# Patient Record
Sex: MALE | Race: WHITE | NOT HISPANIC OR LATINO | Employment: FULL TIME | URBAN - METROPOLITAN AREA
[De-identification: names, ages, dates, MRNs, and addresses within clinical notes are randomized per-mention and may not be internally consistent; named-entity substitution may affect disease eponyms.]

---

## 2017-02-09 RX ORDER — VENLAFAXINE HYDROCHLORIDE 150 MG/1
CAPSULE, EXTENDED RELEASE ORAL
Qty: 90 CAPSULE | OUTPATIENT
Start: 2017-02-09

## 2017-02-09 NOTE — TELEPHONE ENCOUNTER
effexor    Last Written Prescription Date: 5/13/16  Last Fill Quantity: 90, # refills: 3  Last Office Visit with G, P or [facility] prescribing provider: 11/15/16        BP Readings from Last 3 Encounters:   11/15/16 140/100   05/13/16 118/84   04/12/16 130/80     Pulse: (for Fetzima)  CREATININE   Date Value Ref Range Status   05/13/2016 1.11 0.66 - 1.25 mg/dL Final   ]    Last PHQ-9 score on record=   PHQ-9 SCORE 5/13/2016   Total Score -   Total Score 10       Patient has refills on file, request denied.   Mercedes Mcgregor RN   [facility] - Triage

## 2017-03-02 ENCOUNTER — TRANSFERRED RECORDS (OUTPATIENT)
Dept: HEALTH INFORMATION MANAGEMENT | Facility: CLINIC | Age: 57
End: 2017-03-02

## 2017-03-14 ENCOUNTER — PRE VISIT (OUTPATIENT)
Dept: OTOLARYNGOLOGY | Facility: CLINIC | Age: 57
End: 2017-03-14

## 2017-03-14 ENCOUNTER — TRANSFERRED RECORDS (OUTPATIENT)
Dept: HEALTH INFORMATION MANAGEMENT | Facility: CLINIC | Age: 57
End: 2017-03-14

## 2017-03-14 NOTE — TELEPHONE ENCOUNTER
1.  Date/reason for appt: 3/31/17 3:30PM Dx: sleep apnea. Sleep study done on 3/2/17 at [facility]  2.  Referring provider: Self   3.  Call to patient (Yes / No - short description): Yes, call was transferred to me from CC.  4.  Previous care at / records requested from:  [facility] Dr. Jorge Mackenzie - Estela GRAVES   Emailed STAR  to pt ([email or web address])

## 2017-03-15 NOTE — TELEPHONE ENCOUNTER
Records received from [facility].   Included  Office notes: 3/14/17, 2/10/17   Other: sleep study on 3/2/17

## 2017-04-04 DIAGNOSIS — F41.0 PANIC DISORDER WITHOUT AGORAPHOBIA: ICD-10-CM

## 2017-04-04 DIAGNOSIS — Z00.00 ROUTINE GENERAL MEDICAL EXAMINATION AT A HEALTH CARE FACILITY: ICD-10-CM

## 2017-04-04 NOTE — TELEPHONE ENCOUNTER
Venlafaxine    Last Written Prescription Date: 5/13/16  Last Fill Quantity: 90, # refills: 3  Last Office Visit with [facility], [facility] or [facility] prescribing provider: 11/15/16        BP Readings from Last 3 Encounters:   11/15/16 (!) 140/100   05/13/16 118/84   04/12/16 130/80     Pulse: (for Fetzima)  Creatinine   Date Value Ref Range Status   05/13/2016 1.11 0.66 - 1.25 mg/dL Final   ]    Last PHQ-9 score on record=   PHQ-9 SCORE 5/13/2016   Total Score -   Total Score 10     Vane Archer CMA

## 2017-04-05 RX ORDER — VENLAFAXINE HYDROCHLORIDE 150 MG/1
CAPSULE, EXTENDED RELEASE ORAL
Qty: 90 CAPSULE | OUTPATIENT
Start: 2017-04-05

## 2017-04-05 NOTE — TELEPHONE ENCOUNTER
Should have supply until May 2017 when he is due for OV (CPE) with Dr. Davis.  Patient advised via [employer] message.    Rx refused as request too soon.      Mary Navarrete RN

## 2017-05-10 DIAGNOSIS — F41.0 PANIC DISORDER WITHOUT AGORAPHOBIA: ICD-10-CM

## 2017-05-10 DIAGNOSIS — Z00.00 ROUTINE GENERAL MEDICAL EXAMINATION AT A HEALTH CARE FACILITY: ICD-10-CM

## 2017-05-11 RX ORDER — VENLAFAXINE HYDROCHLORIDE 150 MG/1
CAPSULE, EXTENDED RELEASE ORAL
Qty: 90 CAPSULE | Refills: 0 | Status: SHIPPED | OUTPATIENT
Start: 2017-05-11 | End: 2017-07-21

## 2017-05-11 NOTE — TELEPHONE ENCOUNTER
Called patient- he is due for office visit, he has 7 days left of medication- appointment scheduled for Tuesday with Dr. Davis. Refill sent to mail order vitaly Wilder RN  [facility]  [phone]

## 2017-05-11 NOTE — TELEPHONE ENCOUNTER
venlafaxine (EFFEXOR-XR) 150 MG 24 hr capsule     Last Written Prescription Date: 5/13/2016  Last Fill Quantity: 90, # refills: 3  Last Office Visit with [facility] primary care provider:  11/15/2016        Last PHQ-9 score on record=   PHQ-9 SCORE 5/13/2016   Total Score -   Total Score 10

## 2017-06-07 DIAGNOSIS — F40.10 SOCIAL PHOBIA: ICD-10-CM

## 2017-06-07 DIAGNOSIS — F41.0 PANIC DISORDER WITHOUT AGORAPHOBIA: ICD-10-CM

## 2017-06-07 RX ORDER — PROPRANOLOL HYDROCHLORIDE 10 MG/1
TABLET ORAL
Qty: 15 TABLET | Refills: 0 | Status: SHIPPED | OUTPATIENT
Start: 2017-06-07 | End: 2017-07-09

## 2017-06-07 NOTE — TELEPHONE ENCOUNTER
propranolol      Last Written Prescription Date: 4/12/2017  Last Fill Quantity: 30, # refills: 1    Last Office Visit with G, [facility] or [facility] prescribing provider:  11/15/2016   Future Office Visit:        BP Readings from Last 3 Encounters:   11/15/16 (!) 140/100   05/13/16 118/84   04/12/16 130/80

## 2017-06-07 NOTE — TELEPHONE ENCOUNTER
Routing refill request to provider for review/approval because:  Lou given x1 and patient did not follow up, please advise, patient has been sent 2 messages one which he has read  Labs out of range:  BP elevated  Patient needs to be seen because:  Due for follow up   Yeimi Da Silva RN  [facility] [employer]

## 2017-06-27 ENCOUNTER — OFFICE VISIT (OUTPATIENT)
Dept: OTOLARYNGOLOGY | Facility: CLINIC | Age: 57
End: 2017-06-27

## 2017-06-27 VITALS — HEIGHT: 72 IN | BODY MASS INDEX: 30.2 KG/M2 | WEIGHT: 223 LBS

## 2017-06-27 DIAGNOSIS — G47.33 OSA (OBSTRUCTIVE SLEEP APNEA): Primary | ICD-10-CM

## 2017-06-27 RX ORDER — GLYCOPYRROLATE 0.2 MG/ML
0.2 INJECTION, SOLUTION INTRAMUSCULAR; INTRAVENOUS ONCE
Status: CANCELLED | OUTPATIENT
Start: 2017-06-27 | End: 2017-06-27

## 2017-06-27 RX ORDER — OXYMETAZOLINE HYDROCHLORIDE 0.05 G/100ML
2 SPRAY NASAL 2 TIMES DAILY
Status: CANCELLED | OUTPATIENT
Start: 2017-06-27

## 2017-06-27 ASSESSMENT — PAIN SCALES - GENERAL: PAINLEVEL: NO PAIN (0)

## 2017-06-27 NOTE — LETTER
2017       RE: Juni Jain  [address] [place] [address]     Dear Colleague,    Thank you for referring your patient, Juni Jain, to the [facility] EAR NOSE AND THROAT at [facility]. Please see a copy of my visit note below.        SLEEP SURGERY CONSULTATION    Patient: Juni Jain  : 1960  CHIEF COMPLAINT: RYE    IDENTIFICATION: Patient consulted Dr. Paige for surgical evaluation and possible treatment of obstructive sleep apnea syndrome for Juni Jain.    HPI:  Juni Jain is a 56 year old year old male who has Obstructive Sleep Apnea. Diagnosed with REY in  originally.  Most recent sleep study is an HST from [facility] on  3/2/2017.  RDI was 40.7; 18 OA, 1 MA, 0 CA, and 190 hypopneas.  Tried CPAP on 2 occasions for 1 month each time.  He is unable to fall asleep with the mask on.  He has tried several different masks. He feels the CPAP is very disruptive to his sleep.  The air leaks are very bothersome.  He has unrefreshing sleep, loud snoring, daytime sleepiness.  He has had several occasions of drowsy driving but no accidents.          PAST MEDICAL HISTORY:  Past Medical History:   Diagnosis Date     Anxiety state, unspecified      Coronary artery disease 2013    some angina here and there. 2013 at [facility]     Depressive disorder     Anxiety     Depressive disorder, not elsewhere classified      Gastroesophageal reflux disease     somewhat but stopped taking Nexium     Obstructive sleep apnea 2017    most recent sleep study from [facility]     Other and unspecified hyperlipidemia      Sleep apnea     Chronic issue for over 15+ years       PAST SURGICAL HISTORY:  Past Surgical History:   Procedure Laterality Date     COLONOSCOPY      Going 2016     COSMETIC SURGERY      Rhinoplasty/chin implant-Deviated Septum     ENT SURGERY  over the  years    CPAP never worked for me. Uncomfortable.       MEDICATIONS:  Current Outpatient Prescriptions   Medication Sig Dispense Refill     venlafaxine (EFFEXOR-XR) 150 MG 24 hr capsule Take 1 capsule by mouth  daily 90 capsule 0     atorvastatin (LIPITOR) 40 MG tablet Take 1 tablet (40 mg) by mouth daily 90 tablet 3     VITAMIN D, CHOLECALCIFEROL, PO Take 2-4 tablets by mouth daily.       Omega-3 Fatty Acids (FISH OIL) 1200 MG capsule Take 2 capsules by mouth daily. 180 capsule 12     propranolol (INDERAL) 10 MG tablet Take 1-2 tabs 90 minutes before presentation (Patient not taking: Reported on 6/27/2017) 15 tablet 0     ALPRAZolam (XANAX) 0.25 MG tablet as directed for anxiety prior to presentations and PRN anxiety (Patient not taking: Reported on 6/27/2017) 15 tablet 1       ALLERGIES:  Allergies   Allergen Reactions     No Known Drug Allergies        SOCIAL HISTORY:  Social History     Social History     Marital status:      Spouse name: sera saldivar     Number of children: 3     Years of education: 16     Occupational History     sales None      Social History Main Topics     Smoking status: Never Smoker     Smokeless tobacco: Never Used      Comment: Do not smoke     Alcohol use 0.0 oz/week      Comment: Weekends     Drug use: No     Sexual activity: Yes     Partners: Female     Birth control/ protection: Male Surgical      Comment: Vasectomy     Other Topics Concern      Service No     Blood Transfusions No     Caffeine Concern No     Occupational Exposure No     Hobby Hazards No     Sleep Concern No     Stress Concern Yes     Weight Concern Yes     Special Diet Yes     wt watchers     Back Care No     Exercise Yes     Bike Helmet No     Seat Belt Yes     Self-Exams No     Parent/Sibling W/ Cabg, Mi Or Angioplasty Before 65f 55m? No     Social History Narrative       FAMILY HISTORY:  Family History   Problem Relation Age of Onset     Alzheimer Disease Mother      Dementia Mother      Dementia      "C.A.D. Father 86     Bypass 5/2011     CANCER Father      Esophageal cancer     Thyroid Disease Brother      Thyroid Disease Brother      Hypo. Under     CANCER Maternal Grandmother      Stomach Cancer     CANCER Maternal Grandfather      Lung Cancer     Thyroid Disease Brother      ,       REVIEW OF SYSTEMS:   ENT ROS 3/31/2017   Constitutional Unexplained fatigue, Problems with sleep   Neurology Headache   Psychology Frequently feeling depressed or sad, Frequently feeling anxious   Ears, Nose, Throat Nasal congestion or drainage   Cardiopulmonary Cough, Wheezing   Musculoskeletal Neck pain         PHYSICAL EXAM  Ht 1.84 m (6' 0.44\")  Wt 101.2 kg (223 lb)  BMI 29.88 kg/m2    Constitutional: healthy, alert and no distress  ENT:   Mouth: MMM 4, very long uvula, tonsils 1+    EYES: extraocular movements intact      ASSESSMENT:  1.  Severe obstructive sleep apnea, untreated   Incompletely treated sleep apnea elevates risk of death, cardiovascular disease, and motor vehicle crashes, and it creates deficits in daytime function and quality of life.  Surgical treatment can improve these clinically important outcomes; therefore surgical treatment is medically necessary if the patient is not using CPAP adequately.       PLAN:  1.  We discussed obstructive sleep apnea, including pathophysiology and consequences.  We provided the patient with written information about obstructive sleep apnea and its management.  We discussed the beneficial relationship between weight loss and sleep apnea.      I discussed with the patient how UAS works.  We reviewed expected outcomes and MRI restrictions.  We then reviewed the selection criteria.  He does meet 3 out of the 4 criteria.  I would like to determine if the patient is a candidate for surgery.  I would recommend a drug-induced sleep endoscopy to better evaluate the upper airway in order to determine sites of obstruction, degree of collapse, and pattern of collapse.  This " information helps me to  patient what types of surgery are available to him and on chances for surgical success.    I spent 35 minutes face-to-face with Juni Jain during today's office visit, of which more than 50% was spent on counseling and coordination of care, which included discussion of pathophysiology of patient's obstructive sleep apnea, treatment options, risks and benefits of each option.      Again, thank you for allowing me to participate in the care of your patient.      Sincerely,    Liliana Paige MD

## 2017-06-27 NOTE — PROGRESS NOTES
SLEEP SURGERY CONSULTATION    Patient: Juni Jain  : 1960  CHIEF COMPLAINT: REY    IDENTIFICATION: Patient consulted Dr. Paige for surgical evaluation and possible treatment of obstructive sleep apnea syndrome for Juni Jain.    HPI:  Juni Jain is a 56 year old year old male who has Obstructive Sleep Apnea. Diagnosed with REY in  originally.  Most recent sleep study is an HST from [facility] on  3/2/2017.  RDI was 40.7; 18 OA, 1 MA, 0 CA, and 190 hypopneas.  Tried CPAP on 2 occasions for 1 month each time.  He is unable to fall asleep with the mask on.  He has tried several different masks. He feels the CPAP is very disruptive to his sleep.  The air leaks are very bothersome.  He has unrefreshing sleep, loud snoring, daytime sleepiness.  He has had several occasions of drowsy driving but no accidents.          PAST MEDICAL HISTORY:  Past Medical History:   Diagnosis Date     Anxiety state, unspecified      Coronary artery disease 2013    some angina here and there. 2013 at [facility]     Depressive disorder     Anxiety     Depressive disorder, not elsewhere classified      Gastroesophageal reflux disease     somewhat but stopped taking Nexium     Obstructive sleep apnea 2017    most recent sleep study from [facility]     Other and unspecified hyperlipidemia      Sleep apnea 1994    Chronic issue for over 15+ years       PAST SURGICAL HISTORY:  Past Surgical History:   Procedure Laterality Date     COLONOSCOPY      Going 2016     COSMETIC SURGERY  1981    Rhinoplasty/chin implant-Deviated Septum     ENT SURGERY  over the years    CPAP never worked for me. Uncomfortable.       MEDICATIONS:  Current Outpatient Prescriptions   Medication Sig Dispense Refill     venlafaxine (EFFEXOR-XR) 150 MG 24 hr capsule Take 1 capsule by mouth  daily 90 capsule 0     atorvastatin (LIPITOR) 40 MG tablet Take 1 tablet (40 mg) by mouth  daily 90 tablet 3     VITAMIN D, CHOLECALCIFEROL, PO Take 2-4 tablets by mouth daily.       Omega-3 Fatty Acids (FISH OIL) 1200 MG capsule Take 2 capsules by mouth daily. 180 capsule 12     propranolol (INDERAL) 10 MG tablet Take 1-2 tabs 90 minutes before presentation (Patient not taking: Reported on 6/27/2017) 15 tablet 0     ALPRAZolam (XANAX) 0.25 MG tablet as directed for anxiety prior to presentations and PRN anxiety (Patient not taking: Reported on 6/27/2017) 15 tablet 1       ALLERGIES:  Allergies   Allergen Reactions     No Known Drug Allergies        SOCIAL HISTORY:  Social History     Social History     Marital status:      Spouse name: sera saldivar     Number of children: 3     Years of education: 16     Occupational History     sales None      Social History Main Topics     Smoking status: Never Smoker     Smokeless tobacco: Never Used      Comment: Do not smoke     Alcohol use 0.0 oz/week      Comment: Weekends     Drug use: No     Sexual activity: Yes     Partners: Female     Birth control/ protection: Male Surgical      Comment: Vasectomy     Other Topics Concern      Service No     Blood Transfusions No     Caffeine Concern No     Occupational Exposure No     Hobby Hazards No     Sleep Concern No     Stress Concern Yes     Weight Concern Yes     Special Diet Yes     wt watchers     Back Care No     Exercise Yes     Bike Helmet No     Seat Belt Yes     Self-Exams No     Parent/Sibling W/ Cabg, Mi Or Angioplasty Before 65f 55m? No     Social History Narrative       FAMILY HISTORY:  Family History   Problem Relation Age of Onset     Alzheimer Disease Mother      Dementia Mother      Dementia     C.A.D. Father 86     Bypass 5/2011     CANCER Father      Esophageal cancer     Thyroid Disease Brother      Thyroid Disease Brother      Hypo. Under     CANCER Maternal Grandmother      Stomach Cancer     CANCER Maternal Grandfather      Lung Cancer     Thyroid Disease Brother      ,       REVIEW OF  "SYSTEMS:   ENT ROS 3/31/2017   Constitutional Unexplained fatigue, Problems with sleep   Neurology Headache   Psychology Frequently feeling depressed or sad, Frequently feeling anxious   Ears, Nose, Throat Nasal congestion or drainage   Cardiopulmonary Cough, Wheezing   Musculoskeletal Neck pain         PHYSICAL EXAM  Ht 1.84 m (6' 0.44\")  Wt 101.2 kg (223 lb)  BMI 29.88 kg/m2    Constitutional: healthy, alert and no distress  ENT:   Mouth: MMM 4, very long uvula, tonsils 1+    EYES: extraocular movements intact      ASSESSMENT:  1.  Severe obstructive sleep apnea, untreated   Incompletely treated sleep apnea elevates risk of death, cardiovascular disease, and motor vehicle crashes, and it creates deficits in daytime function and quality of life.  Surgical treatment can improve these clinically important outcomes; therefore surgical treatment is medically necessary if the patient is not using CPAP adequately.       PLAN:  1.  We discussed obstructive sleep apnea, including pathophysiology and consequences.  We provided the patient with written information about obstructive sleep apnea and its management.  We discussed the beneficial relationship between weight loss and sleep apnea.      I discussed with the patient how UAS works.  We reviewed expected outcomes and MRI restrictions.  We then reviewed the selection criteria.  He does meet 3 out of the 4 criteria.  I would like to determine if the patient is a candidate for surgery.  I would recommend a drug-induced sleep endoscopy to better evaluate the upper airway in order to determine sites of obstruction, degree of collapse, and pattern of collapse.  This information helps me to  patient what types of surgery are available to him and on chances for surgical success.    I spent 35 minutes face-to-face with Juni Jain during today's office visit, of which more than 50% was spent on counseling and coordination of care, which included discussion of " pathophysiology of patient's obstructive sleep apnea, treatment options, risks and benefits of each option.

## 2017-06-27 NOTE — NURSING NOTE
Chief Complaint   Patient presents with     Consult     [facility] - Sleep apnea     Pt states no pain today.    MELY Montesinos LPN

## 2017-06-27 NOTE — NURSING NOTE
Relevant Diagnosis: sleep apnea  Teaching Topic: Pre op teaching for drug induced sleep endoscopy    Person(s) involved in teaching:  Patient     Teaching Concerns Addressed:  Pre op teaching included the need for an H&P, NPO status pre op, hospital routines, expected recovery, activity  restrictions, antimicrobial scrub, s/s of infection, pain control methods and the importance of follow up appointments.  The patient voiced an understanding of all instructions and will call with questions.     Motivation Level:  Asks Questions:   Yes  Eager to Learn:   Yes  Cooperative:   Yes  Receptive (willing/able to accept information):   Yes     Patient  demonstrates understanding of the following:  Reason for the appointment, diagnosis and treatment plan:   Yes  Knowledge of proper use of medications and conditions for which they are ordered (with special attention to potential side effects or drug interactions):   Yes  Which situations necessitate calling provider and whom to contact:   Yes        Proper use and care of  (medical equip, care aids, etc.):   NA  Nutritional needs and diet plan:   Yes  Pain management techniques:   Yes  Patient instructed on hand hygiene:  Yes  How and/when to access community resources:   NA     Infection Prevention:  Patient   demonstrates understanding of the following:  Surgical procedure site care taught   Signs and symptoms of infection taught Yes  Wound care taught Yes     Instructional Materials Used/Given: Pre op booklet.

## 2017-06-27 NOTE — PATIENT INSTRUCTIONS
1.  You were seen in the ENT Clinic today by Dr. Paige.  If you have any questions or concerns after your appointment, please call [phone].  2.  Plan is to move forward with a drug induced sleep endoscopy. This is an outpatient procedure that is done in the operating room.  You will need a  the day of surgery  3. You will need to consult with your primary care MD for a pre op physical.  Forms for this were sent home with you today.  4. [facility] our surgery scheduler will call you with a date and time for the procedure. [phone]  5.  Please return to the clinic one week after your procedure to review the results with Dr. Paige and to develop the plan of care.

## 2017-06-27 NOTE — MR AVS SNAPSHOT
After Visit Summary   6/27/2017    Juni Jain    MRN: [id number]           Patient Information     Date Of Birth          1960        Visit Information        Provider Department      6/27/2017 3:45 PM Liliana Paige MD [facility] Ear Nose and Throat        Today's Diagnoses     REY (obstructive sleep apnea)    -  1      Care Instructions    1.  You were seen in the ENT Clinic today by Dr. Paige.  If you have any questions or concerns after your appointment, please call [phone].  2.  Plan is to move forward with a drug induced sleep endoscopy. This is an outpatient procedure that is done in the operating room.  You will need a  the day of surgery  3. You will need to consult with your primary care MD for a pre op physical.  Forms for this were sent home with you today.  4. [facility] surgery scheduler will call you with a date and time for the procedure. [phone]  5.  Please return to the clinic one week after your procedure to review the results with Dr. Paige and to develop the plan of care.                  Follow-ups after your visit        Who to contact     Please call your clinic at [phone] to:    Ask questions about your health    Make or cancel appointments    Discuss your medicines    Learn about your test results    Speak to your doctor   If you have compliments or concerns about an experience at your clinic, or if you wish to file a complaint, please contact [facility] Patient Relations at [phone] or email us at [email or web address]         Additional Information About Your Visit        MyChart Information     [employer] gives you secure access to your electronic health record. If you see a primary care provider, you can also send messages to your care team and make appointments. If you have questions, please call your primary care clinic.  If you do not have a primary care provider, please call [phone] and  "they will assist you.      [employer] is an electronic gateway that provides easy, online access to your medical records. With [employer] you can request a clinic appointment, read your test results, renew a prescription or communicate with your care team.     To access your existing account, please contact your [facility] or call [phone] for assistance.        Care EveryWhere ID     This is your Care EveryWhere ID. This could be used by other organizations to access your [place] medical records  [id number]        Your Vitals Were     Height BMI (Body Mass Index)                1.84 m (6' 0.44\") 29.88 kg/m2           Blood Pressure from Last 3 Encounters:   11/15/16 (!) 140/100   05/13/16 118/84   04/12/16 130/80    Weight from Last 3 Encounters:   06/27/17 101.2 kg (223 lb)   11/15/16 100.5 kg (221 lb 9.6 oz)   05/13/16 101.2 kg (223 lb)              We Performed the Following     Venus-Operative Worksheet        Primary Care Provider Office Phone # Fax #    Ru Davis [phone]       [facility] [place] [address] [place] [address] DR  [place] [address]        Equal Access to Services     BLANE ALLISON : Hadii aad ku hadasho Soomaali, waaxda luqadaha, qaybta kaalmada adeegyada, waxay idiin hayaan leah luna la'ania . So [facility] [phone].    ATENCIÓN: Si habla español, tiene a mitchell disposición servicios gratuitos de asistencia lingüística. Llame al [phone].    We comply with applicable federal civil rights laws and [place] laws. We do not discriminate on the basis of race, color, national origin, age, disability sex, sexual orientation or gender identity.            Thank you!     Thank you for choosing [facility] EAR NOSE AND THROAT  for your care. Our goal is always to provide you with excellent care. Hearing back from our patients is one way we can continue to improve our services. Please take a few minutes to complete the written survey that you may " receive in the mail after your visit with us. Thank you!             Your Updated Medication List - Protect others around you: Learn how to safely use, store and throw away your medicines at www.disposemymeds.org.          This list is accurate as of: 6/27/17  4:23 PM.  Always use your most recent med list.                   Brand Name Dispense Instructions for use Diagnosis    ALPRAZolam 0.25 MG tablet    XANAX    15 tablet    as directed for anxiety prior to presentations and PRN anxiety    Panic disorder without agoraphobia, Social phobia       atorvastatin 40 MG tablet    LIPITOR    90 tablet    Take 1 tablet (40 mg) by mouth daily    Hyperlipidemia LDL goal <130       omega-3 fatty acids 1200 MG capsule     180 capsule    Take 2 capsules by mouth daily.        propranolol 10 MG tablet    INDERAL    15 tablet    Take 1-2 tabs 90 minutes before presentation    Panic disorder without agoraphobia, Social phobia       venlafaxine 150 MG 24 hr capsule    EFFEXOR-XR    90 capsule    Take 1 capsule by mouth  daily    Panic disorder without agoraphobia, Routine general medical examination at a health care facility       VITAMIN D (CHOLECALCIFEROL) PO      Take 2-4 tablets by mouth daily.

## 2017-07-04 DIAGNOSIS — F41.0 PANIC DISORDER WITHOUT AGORAPHOBIA: ICD-10-CM

## 2017-07-04 DIAGNOSIS — Z00.00 ROUTINE GENERAL MEDICAL EXAMINATION AT A HEALTH CARE FACILITY: ICD-10-CM

## 2017-07-05 NOTE — TELEPHONE ENCOUNTER
Venlafaxine (EFFEXOR-XR) 150 MG 24 hr capsule    Last Written Prescription Date: 05/11/2017  Last Fill Quantity: 90 capsule, # refills: 0  Last Office Visit with G, P or [facility] prescribing provider: 11/15/2016   Next 5 appointments (look out 90 days)     Jul 07, 2017  8:40 AM CDT   Pre-Op physical with Ru Davis MD   [facility]    [address] [place] [address] [phone]                   BP Readings from Last 3 Encounters:   11/15/16 (!) 140/100   05/13/16 118/84   04/12/16 130/80     Pulse: (for Fetzima)  Creatinine   Date Value Ref Range Status   05/13/2016 1.11 0.66 - 1.25 mg/dL Final   ]    Last PHQ-9 score on record=   PHQ-9 SCORE 5/13/2016   Total Score -   Total Score 10       Sonali Hdz MA

## 2017-07-06 RX ORDER — VENLAFAXINE HYDROCHLORIDE 150 MG/1
CAPSULE, EXTENDED RELEASE ORAL
Qty: 90 CAPSULE | OUTPATIENT
Start: 2017-07-06

## 2017-07-06 NOTE — TELEPHONE ENCOUNTER
Patient should have enough for one more month- refill too early    Paty Wilder,RN  [facility]  [phone]

## 2017-07-07 ENCOUNTER — NURSE TRIAGE (OUTPATIENT)
Dept: NURSING | Facility: CLINIC | Age: 57
End: 2017-07-07

## 2017-07-07 ENCOUNTER — OFFICE VISIT (OUTPATIENT)
Dept: FAMILY MEDICINE | Facility: CLINIC | Age: 57
End: 2017-07-07
Payer: COMMERCIAL

## 2017-07-07 VITALS
WEIGHT: 224 LBS | DIASTOLIC BLOOD PRESSURE: 76 MMHG | HEART RATE: 79 BPM | OXYGEN SATURATION: 94 % | TEMPERATURE: 95.7 F | SYSTOLIC BLOOD PRESSURE: 104 MMHG | BODY MASS INDEX: 30.01 KG/M2

## 2017-07-07 DIAGNOSIS — R79.89 ELEVATED LIVER FUNCTION TESTS: ICD-10-CM

## 2017-07-07 DIAGNOSIS — G47.33 OSA (OBSTRUCTIVE SLEEP APNEA): ICD-10-CM

## 2017-07-07 DIAGNOSIS — R01.1 CARDIAC MURMUR: ICD-10-CM

## 2017-07-07 DIAGNOSIS — Z01.818 PREOP GENERAL PHYSICAL EXAM: Primary | ICD-10-CM

## 2017-07-07 DIAGNOSIS — F41.1 GAD (GENERALIZED ANXIETY DISORDER): ICD-10-CM

## 2017-07-07 LAB
ALBUMIN SERPL-MCNC: 4.1 G/DL (ref 3.4–5)
ALP SERPL-CCNC: 107 U/L (ref 40–150)
ALT SERPL W P-5'-P-CCNC: 95 U/L (ref 0–70)
ANION GAP SERPL CALCULATED.3IONS-SCNC: 12 MMOL/L (ref 3–14)
AST SERPL W P-5'-P-CCNC: 30 U/L (ref 0–45)
BILIRUB SERPL-MCNC: 1.6 MG/DL (ref 0.2–1.3)
BUN SERPL-MCNC: 13 MG/DL (ref 7–30)
CALCIUM SERPL-MCNC: 8.9 MG/DL (ref 8.5–10.1)
CHLORIDE SERPL-SCNC: 107 MMOL/L (ref 94–109)
CO2 SERPL-SCNC: 21 MMOL/L (ref 20–32)
CREAT SERPL-MCNC: 1.17 MG/DL (ref 0.66–1.25)
GFR SERPL CREATININE-BSD FRML MDRD: 65 ML/MIN/1.7M2
GLUCOSE SERPL-MCNC: 109 MG/DL (ref 70–99)
HGB BLD-MCNC: 16.7 G/DL (ref 13.3–17.7)
POTASSIUM SERPL-SCNC: 4.3 MMOL/L (ref 3.4–5.3)
PROT SERPL-MCNC: 7.4 G/DL (ref 6.8–8.8)
SODIUM SERPL-SCNC: 140 MMOL/L (ref 133–144)

## 2017-07-07 PROCEDURE — 85018 HEMOGLOBIN: CPT | Performed by: FAMILY MEDICINE

## 2017-07-07 PROCEDURE — 36415 COLL VENOUS BLD VENIPUNCTURE: CPT | Performed by: FAMILY MEDICINE

## 2017-07-07 PROCEDURE — 99214 OFFICE O/P EST MOD 30 MIN: CPT | Performed by: FAMILY MEDICINE

## 2017-07-07 PROCEDURE — 80053 COMPREHEN METABOLIC PANEL: CPT | Performed by: FAMILY MEDICINE

## 2017-07-07 ASSESSMENT — ANXIETY QUESTIONNAIRES
6. BECOMING EASILY ANNOYED OR IRRITABLE: MORE THAN HALF THE DAYS
GAD7 TOTAL SCORE: 12
2. NOT BEING ABLE TO STOP OR CONTROL WORRYING: MORE THAN HALF THE DAYS
7. FEELING AFRAID AS IF SOMETHING AWFUL MIGHT HAPPEN: SEVERAL DAYS
5. BEING SO RESTLESS THAT IT IS HARD TO SIT STILL: SEVERAL DAYS
1. FEELING NERVOUS, ANXIOUS, OR ON EDGE: MORE THAN HALF THE DAYS
3. WORRYING TOO MUCH ABOUT DIFFERENT THINGS: MORE THAN HALF THE DAYS
IF YOU CHECKED OFF ANY PROBLEMS ON THIS QUESTIONNAIRE, HOW DIFFICULT HAVE THESE PROBLEMS MADE IT FOR YOU TO DO YOUR WORK, TAKE CARE OF THINGS AT HOME, OR GET ALONG WITH OTHER PEOPLE: SOMEWHAT DIFFICULT

## 2017-07-07 ASSESSMENT — PATIENT HEALTH QUESTIONNAIRE - PHQ9: 5. POOR APPETITE OR OVEREATING: MORE THAN HALF THE DAYS

## 2017-07-07 NOTE — TELEPHONE ENCOUNTER
"Patient calling to confirm lab results from today in clinic.  He also wanted to make sure the US was ordered.  \"I think the doctor told me [facility] would be performing the US.\"  [facility] phone number given to patient and told him to call there on Monday as writer was unable to find if the order was sent over.    Tracy Shah RN/FNA    "

## 2017-07-07 NOTE — NURSING NOTE
"Chief Complaint   Patient presents with     Pre-Op Exam       Initial /76  Pulse 79  Temp 95.7  F (35.4  C) (Tympanic)  Wt 224 lb (101.6 kg)  SpO2 94%  BMI 30.01 kg/m2 Estimated body mass index is 30.01 kg/(m^2) as calculated from the following:    Height as of 6/27/17: 6' 0.44\" (1.84 m).    Weight as of this encounter: 224 lb (101.6 kg).  Medication Reconciliation: complete    Current Outpatient Prescriptions   Medication Sig Dispense Refill     propranolol (INDERAL) 10 MG tablet Take 1-2 tabs 90 minutes before presentation 15 tablet 0     venlafaxine (EFFEXOR-XR) 150 MG 24 hr capsule Take 1 capsule by mouth  daily 90 capsule 0     atorvastatin (LIPITOR) 40 MG tablet Take 1 tablet (40 mg) by mouth daily 90 tablet 3     ALPRAZolam (XANAX) 0.25 MG tablet as directed for anxiety prior to presentations and PRN anxiety 15 tablet 1     VITAMIN D, CHOLECALCIFEROL, PO Take 2-4 tablets by mouth daily.       Omega-3 Fatty Acids (FISH OIL) 1200 MG capsule Take 2 capsules by mouth daily. 180 capsule 12       Reji M, CMA  "

## 2017-07-07 NOTE — PROGRESS NOTES
[facility]  [address] [address]  [phone]  Dept: [phone]    PRE-OP EVALUATION:  Today's date: 2017    Juni Jain (: 1960) presents for pre-operative evaluation assessment as requested by Dr. Paige.  He requires evaluation and anesthesia risk assessment prior to undergoing surgery/procedure for treatment of  .  Proposed procedure: Sleep Endoscopy    Date of Surgery/ Procedure: 17  Time of Surgery/ Procedure: 9am  Hospital/Surgical Facility: [facility]    Primary Physician: Ru Davis  Type of Anesthesia Anticipated: to be determined    Patient has a Health Care Directive or Living Will:  NO    Preop Questions 2017   1.  Do you have a history of heart attack, stroke, stent, bypass or surgery on an artery in the head, neck, heart or legs? No   2.  Do you ever have any pain or discomfort in your chest? YES -  Not currenlty   3.  Do you have a history of  Heart Failure? No   4.   Are you troubled by shortness of breath when:  walking on a level surface, or up a slight hill, or at night? YES -    5.  Do you currently have a cold, bronchitis or other respiratory infection? No   6.  Do you have a cough, shortness of breath, or wheezing? No   7.  Do you sometimes get pains in the calves of your legs when you walk? No   8. Do you or anyone in your family have previous history of blood clots? No   9.  Do you or does anyone in your family have a serious bleeding problem such as prolonged bleeding following surgeries or cuts? No   10. Have you ever had problems with anemia or been told to take iron pills? No   11. Have you had any abnormal blood loss such as black, tarry or bloody stools? No   12. Have you ever had a blood transfusion? No   13. Have you or any of your relatives ever had problems with anesthesia? No   14. Do you have sleep apnea, excessive snoring or daytime drowsiness? YES -    15. Do you have any prosthetic  heart valves? No   16. Do you have prosthetic joints? No         HPI:                                                      Brief HPI related to upcoming procedure:       See problem list for active medical problems.  Problems all longstanding and stable, except as noted/documented.  See ROS for pertinent symptoms related to these conditions.                                                                                                  .    MEDICAL HISTORY:                                                      Patient Active Problem List    Diagnosis Date Noted     ELVIS (generalized anxiety disorder) 05/13/2016     Priority: Medium     Chronic folliculitis 04/12/2016     Priority: Medium     HYPERLIPIDEMIA LDL GOAL <130 10/31/2010     Priority: Medium     Esophageal reflux 10/05/2005     Priority: Medium     Depressive disorder, not elsewhere classified 10/04/2005     Priority: Medium     Panic disorder without agoraphobia 11/18/2003     Priority: Medium      Past Medical History:   Diagnosis Date     Anxiety state, unspecified      Coronary artery disease April 2013    some angina here and there. April 2013 at [facility]     Depressive disorder     Anxiety     Depressive disorder, not elsewhere classified      Gastroesophageal reflux disease     somewhat but stopped taking Nexium     Obstructive sleep apnea March 2, 2017    most recent sleep study from [facility]     Other and unspecified hyperlipidemia      Sleep apnea 1994    Chronic issue for over 15+ years     Past Surgical History:   Procedure Laterality Date     COLONOSCOPY  2013    Going June 9, 2016     COSMETIC SURGERY  1981    Rhinoplasty/chin implant-Deviated Septum     ENT SURGERY  over the years    CPAP never worked for me. Uncomfortable.     Current Outpatient Prescriptions   Medication Sig Dispense Refill     propranolol (INDERAL) 10 MG tablet Take 1-2 tabs 90 minutes before presentation 15 tablet 0     venlafaxine (EFFEXOR-XR) 150 MG  24 hr capsule Take 1 capsule by mouth  daily 90 capsule 0     atorvastatin (LIPITOR) 40 MG tablet Take 1 tablet (40 mg) by mouth daily 90 tablet 3     ALPRAZolam (XANAX) 0.25 MG tablet as directed for anxiety prior to presentations and PRN anxiety 15 tablet 1     VITAMIN D, CHOLECALCIFEROL, PO Take 2-4 tablets by mouth daily.       Omega-3 Fatty Acids (FISH OIL) 1200 MG capsule Take 2 capsules by mouth daily. 180 capsule 12     OTC products: None, except as noted above    Allergies   Allergen Reactions     No Known Drug Allergies       Latex Allergy: NO    Social History   Substance Use Topics     Smoking status: Never Smoker     Smokeless tobacco: Never Used      Comment: Do not smoke     Alcohol use 0.0 oz/week      Comment: Weekends     History   Drug Use No       REVIEW OF SYSTEMS:                                                    Constitutional, HEENT, cardiovascular, pulmonary, gi and gu systems are negative, except as otherwise noted.    EXAM:                                                    /76  Pulse 79  Temp 95.7  F (35.4  C) (Tympanic)  Wt 224 lb (101.6 kg)  SpO2 94%  BMI 30.01 kg/m2    GENERAL APPEARANCE: healthy, alert and no distress     EYES: EOMI,  PERRL     HENT: ear canals and TM's normal and nose and mouth without ulcers or lesions     NECK: no adenopathy, no asymmetry, masses, or scars and thyroid normal to palpation     RESP: lungs clear to auscultation - no rales, rhonchi or wheezes     CV: normal rhythm,very early systolic murmer.     ABDOMEN:  soft, nontender, no HSM or masses and bowel sounds normal     MS: extremities normal- no gross deformities noted, no evidence of inflammation in joints, FROM in all extremities.     SKIN: no suspicious lesions or rashes     NEURO: Normal strength and tone, sensory exam grossly normal, mentation intact and speech normal     PSYCH: mentation appears normal. and affect normal/bright     LYMPHATICS: No axillary, cervical, or supraclavicular  nodes    DIAGNOSTICS:                                                      Results for orders placed or performed in visit on 07/07/17   Hemoglobin   Result Value Ref Range    Hemoglobin 16.7 13.3 - 17.7 g/dL   Comprehensive metabolic panel   Result Value Ref Range    Sodium 140 133 - 144 mmol/L    Potassium 4.3 3.4 - 5.3 mmol/L    Chloride 107 94 - 109 mmol/L    Carbon Dioxide 21 20 - 32 mmol/L    Anion Gap 12 3 - 14 mmol/L    Glucose 109 (H) 70 - 99 mg/dL    Urea Nitrogen 13 7 - 30 mg/dL    Creatinine 1.17 0.66 - 1.25 mg/dL    GFR Estimate 65 >60 mL/min/1.7m2    GFR Estimate If Black 78 >60 mL/min/1.7m2    Calcium 8.9 8.5 - 10.1 mg/dL    Bilirubin Total 1.6 (H) 0.2 - 1.3 mg/dL    Albumin 4.1 3.4 - 5.0 g/dL    Protein Total 7.4 6.8 - 8.8 g/dL    Alkaline Phosphatase 107 40 - 150 U/L    ALT 95 (H) 0 - 70 U/L    AST 30 0 - 45 U/L          Echocardiogram was done which is normal as well.    Recent Labs   Lab Test  05/13/16   0759  05/22/15   0814   04/10/13   2258   HGB   --    --    --   15.8   PLT   --    --    --   178   NA  139  136   < >  143   POTASSIUM  4.1  4.7   < >  4.1   CR  1.11  1.27*   < >  1.27*    < > = values in this interval not displayed.        IMPRESSION:                                                    Reason for surgery/procedure:   Diagnosis/reason for consult: Preop general physical exam  (primary encounter diagnosis)  ELVIS (generalized anxiety disorder)  Cardiac murmur  REY (obstructive sleep apnea)  Elevated liver function tests      The proposed surgical procedure is considered LOW risk.    REVISED CARDIAC RISK INDEX  The patient has the following serious cardiovascular risks for perioperative complications such as (MI, PE, VFib and 3  AV Block):  No serious cardiac risks  INTERPRETATION: 0 risks: Class I (very low risk - 0.4% complication rate)    The patient has the following additional risks for perioperative complications:  No identified additional risks      ICD-10-CM    1. Preop  general physical exam Z01.818 Hemoglobin     Comprehensive metabolic panel   2. ELVIS (generalized anxiety disorder) F41.1 Hemoglobin     Comprehensive metabolic panel   3. Cardiac murmur R01.1 Echocardiogram Complete   4. REY (obstructive sleep apnea) G47.33 Echocardiogram Complete   5. Elevated liver function tests R79.89 US Abdomen Limited       RECOMMENDATIONS:                                                      --Consult hospital rounder / IM to assist post-op medical management  Echocardiogram done which is normal.    --Patient is to take all scheduled medications on the day of surgery EXCEPT for modifications listed below.      APPROVAL GIVEN to proceed with proposed procedure, without further diagnostic evaluation       Signed Electronically by: Ru Daivs MD    Copy of this evaluation report is provided to requesting physician.    [place] Preop Guidelines

## 2017-07-07 NOTE — MR AVS SNAPSHOT
After Visit Summary   7/7/2017    Juni Jain    MRN: [id number]           Patient Information     Date Of Birth          1960        Visit Information        Provider Department      7/7/2017 8:40 AM Ru Davis MD [facility]        Today's Diagnoses     Preop general physical exam    -  1    ELVIS (generalized anxiety disorder)        Cardiac murmur        REY (obstructive sleep apnea)        Elevated liver function tests          Care Instructions      Before Your Surgery      Call your surgeon if there is any change in your health. This includes signs of a cold or flu (such as a sore throat, runny nose, cough, rash or fever).    Do not smoke, drink alcohol or take over the counter medicine (unless your surgeon or primary care doctor tells you to) for the 24 hours before and after surgery.    If you take prescribed drugs: Follow your doctor s orders about which medicines to take and which to stop until after surgery.    Eating and drinking prior to surgery: follow the instructions from your surgeon    Take a shower or bath the night before surgery. Use the soap your surgeon gave you to gently clean your skin. If you do not have soap from your surgeon, use your regular soap. Do not shave or scrub the surgery site.  Wear clean pajamas and have clean sheets on your bed.           Follow-ups after your visit        Your next 10 appointments already scheduled     Jul 17, 2017  7:30 AM CDT   (Arrive by 7:15 AM)   PAC PHONE RN ASSESSMENT with Uc Pac Rn   [facility] Preoperative [facility] ([facility])    [address] [place] [address]  4th Floor  [facility] [address] [phone]           Note: this is not an onsite visit; there is no need to come to the facility.            Jul 19, 2017   Procedure with Liliana Paige MD   [facility] Surgery [facility] Procedure [facility] ([facility])    [address] [place] [address]  Floor  [facility] [address] [phone]           Located in the [facility] at [address], [facility] [address].   parking is very convenient and highly recommended.  is a $6 flat rate fee.  Both  and self parkers should enter the main arrival plaza from [facility]; parking attendants will direct you based on your parking preference.            Jul 21, 2017  7:40 AM CDT   PHYSICAL with Ru Davis MD   [facility] [place]    [address] [place] [address]   [phone]            Jul 25, 2017  4:30 PM CDT   (Arrive by 4:15 PM)   Return Visit with Liliana Paige MD   [facility] ([facility])    [address] [place] [address]  4th [facility] [id number] [phone]              Who to contact     If you have questions or need follow up information about today's clinic visit or your schedule please contact [facility] directly at [phone].  Normal or non-critical lab and imaging results will be communicated to you by MyChart, letter or phone within 4 business days after the clinic has received the results. If you do not hear from us within 7 days, please contact the clinic through [employer] or phone. If you have a critical or abnormal lab result, we will notify you by phone as soon as possible.  Submit refill requests through [employer] or call your pharmacy and they will forward the refill request to us. Please allow 3 business days for your refill to be completed.          Additional Information About Your Visit        [employer] Information     [employer] gives you secure access to your electronic health record. If you see a primary care provider, you can also send messages to your care team and make appointments. If you have questions, please call your primary care clinic.  If you do not have a primary care provider, please call  [phone] and they will assist you.        Care EveryWhere ID     This is your Care EveryWhere ID. This could be used by other organizations to access your [facility] medical records  [id number]        Your Vitals Were     Pulse Temperature Pulse Oximetry BMI (Body Mass Index)          79 95.7  F (35.4  C) (Tympanic) 94% 30.01 kg/m2         Blood Pressure from Last 3 Encounters:   07/07/17 104/76   11/15/16 (!) 140/100   05/13/16 118/84    Weight from Last 3 Encounters:   07/07/17 224 lb (101.6 kg)   06/27/17 223 lb (101.2 kg)   11/15/16 221 lb 9.6 oz (100.5 kg)              We Performed the Following     Comprehensive metabolic panel     Hemoglobin        Primary Care Provider Office Phone # Fax #    Ru Davis [phone]       [facility] [place] [address] [place] [address] [address]  [place] [address]        Equal Access to Services     BLANE ALLISON : Hadii aad ku hadasho Soomaali, waaxda luqadaha, qaybta kaalmada adeegyada, waxay idiin hayaan leah santiago . So [facility] [phone].    ATENCIÓN: Si habla español, tiene a mitchell disposición servicios gratuitos de asistencia lingüística. Llame al [phone].    We comply with applicable federal civil rights laws and [place] laws. We do not discriminate on the basis of race, color, national origin, age, disability sex, sexual orientation or gender identity.            Thank you!     [facility] you [facility]  [facility]. Our goal is always to provide you with excellent care. Hearing back from our patients is one way we can continue to improve our services. Please take a few minutes to complete the written survey that you may receive in the mail after your visit with us. Thank you!             Your Updated Medication List - Protect others around you: Learn how to safely use, store and throw away your medicines at www.disposemymeds.org.          This list is accurate as of: 7/7/17 11:59 PM.  Always use your most  recent med list.                   Brand Name Dispense Instructions for use Diagnosis    ALPRAZolam 0.25 MG tablet    XANAX    15 tablet    as directed for anxiety prior to presentations and PRN anxiety    Panic disorder without agoraphobia, Social phobia       atorvastatin 40 MG tablet    LIPITOR    90 tablet    Take 1 tablet (40 mg) by mouth daily    Hyperlipidemia LDL goal <130       omega-3 fatty acids 1200 MG capsule     180 capsule    Take 2 capsules by mouth daily.        venlafaxine 150 MG 24 hr capsule    EFFEXOR-XR    90 capsule    Take 1 capsule by mouth  daily    Panic disorder without agoraphobia, Routine general medical examination at a health care facility       VITAMIN D (CHOLECALCIFEROL) PO      Take 2-4 tablets by mouth daily.

## 2017-07-08 ASSESSMENT — PATIENT HEALTH QUESTIONNAIRE - PHQ9: SUM OF ALL RESPONSES TO PHQ QUESTIONS 1-9: 18

## 2017-07-08 ASSESSMENT — ANXIETY QUESTIONNAIRES: GAD7 TOTAL SCORE: 12

## 2017-07-09 DIAGNOSIS — F40.10 SOCIAL PHOBIA: ICD-10-CM

## 2017-07-09 DIAGNOSIS — F41.0 PANIC DISORDER WITHOUT AGORAPHOBIA: ICD-10-CM

## 2017-07-10 ENCOUNTER — TRANSFERRED RECORDS (OUTPATIENT)
Dept: HEALTH INFORMATION MANAGEMENT | Facility: CLINIC | Age: 57
End: 2017-07-10

## 2017-07-10 RX ORDER — PROPRANOLOL HYDROCHLORIDE 10 MG/1
TABLET ORAL
Qty: 15 TABLET | Refills: 3 | Status: SHIPPED | OUTPATIENT
Start: 2017-07-10 | End: 2018-03-29

## 2017-07-10 NOTE — TELEPHONE ENCOUNTER
propranolol (INDERAL) 10 MG tablet      Last Written Prescription Date: 6/7/2017  Last Fill Quantity: 15, # refills: 0    Last Office Visit with [facility], P or [facility] prescribing provider:  7/7/2017   Future Office Visit:        BP Readings from Last 3 Encounters:   07/07/17 104/76   11/15/16 (!) 140/100   05/13/16 118/84

## 2017-07-10 NOTE — TELEPHONE ENCOUNTER
Refill approved through [facility] protocol.  Paty Wilder RN  [facility]  [phone]

## 2017-07-12 ENCOUNTER — TELEPHONE (OUTPATIENT)
Dept: FAMILY MEDICINE | Facility: CLINIC | Age: 57
End: 2017-07-12

## 2017-07-13 ENCOUNTER — ANESTHESIA EVENT (OUTPATIENT)
Dept: SURGERY | Facility: AMBULATORY SURGERY CENTER | Age: 57
End: 2017-07-13

## 2017-07-13 ENCOUNTER — RADIANT APPOINTMENT (OUTPATIENT)
Dept: CARDIOLOGY | Facility: CLINIC | Age: 57
End: 2017-07-13
Attending: FAMILY MEDICINE
Payer: COMMERCIAL

## 2017-07-13 DIAGNOSIS — R01.1 CARDIAC MURMUR: ICD-10-CM

## 2017-07-13 DIAGNOSIS — G47.33 OSA (OBSTRUCTIVE SLEEP APNEA): ICD-10-CM

## 2017-07-13 PROCEDURE — 93306 TTE W/DOPPLER COMPLETE: CPT | Performed by: INTERNAL MEDICINE

## 2017-07-17 ENCOUNTER — ALLIED HEALTH/NURSE VISIT (OUTPATIENT)
Dept: SURGERY | Facility: CLINIC | Age: 57
End: 2017-07-17

## 2017-07-19 ENCOUNTER — HOSPITAL ENCOUNTER (OUTPATIENT)
Facility: AMBULATORY SURGERY CENTER | Age: 57
End: 2017-07-19
Attending: OTOLARYNGOLOGY

## 2017-07-19 ENCOUNTER — SURGERY (OUTPATIENT)
Age: 57
End: 2017-07-19

## 2017-07-19 ENCOUNTER — ANESTHESIA (OUTPATIENT)
Dept: SURGERY | Facility: AMBULATORY SURGERY CENTER | Age: 57
End: 2017-07-19

## 2017-07-19 VITALS
OXYGEN SATURATION: 96 % | BODY MASS INDEX: 30.34 KG/M2 | DIASTOLIC BLOOD PRESSURE: 88 MMHG | HEIGHT: 72 IN | SYSTOLIC BLOOD PRESSURE: 130 MMHG | HEART RATE: 67 BPM | TEMPERATURE: 96.9 F | WEIGHT: 224 LBS | RESPIRATION RATE: 16 BRPM

## 2017-07-19 RX ORDER — SODIUM CHLORIDE, SODIUM LACTATE, POTASSIUM CHLORIDE, CALCIUM CHLORIDE 600; 310; 30; 20 MG/100ML; MG/100ML; MG/100ML; MG/100ML
INJECTION, SOLUTION INTRAVENOUS CONTINUOUS
Status: DISCONTINUED | OUTPATIENT
Start: 2017-07-19 | End: 2017-07-20 | Stop reason: HOSPADM

## 2017-07-19 RX ORDER — OXYMETAZOLINE HYDROCHLORIDE 0.05 G/100ML
2 SPRAY NASAL 2 TIMES DAILY
Status: DISCONTINUED | OUTPATIENT
Start: 2017-07-19 | End: 2017-07-19 | Stop reason: HOSPADM

## 2017-07-19 RX ORDER — SODIUM CHLORIDE, SODIUM LACTATE, POTASSIUM CHLORIDE, CALCIUM CHLORIDE 600; 310; 30; 20 MG/100ML; MG/100ML; MG/100ML; MG/100ML
INJECTION, SOLUTION INTRAVENOUS CONTINUOUS
Status: DISCONTINUED | OUTPATIENT
Start: 2017-07-19 | End: 2017-07-19 | Stop reason: HOSPADM

## 2017-07-19 RX ORDER — NALOXONE HYDROCHLORIDE 0.4 MG/ML
.1-.4 INJECTION, SOLUTION INTRAMUSCULAR; INTRAVENOUS; SUBCUTANEOUS
Status: DISCONTINUED | OUTPATIENT
Start: 2017-07-19 | End: 2017-07-20 | Stop reason: HOSPADM

## 2017-07-19 RX ORDER — ONDANSETRON 2 MG/ML
4 INJECTION INTRAMUSCULAR; INTRAVENOUS EVERY 30 MIN PRN
Status: DISCONTINUED | OUTPATIENT
Start: 2017-07-19 | End: 2017-07-20 | Stop reason: HOSPADM

## 2017-07-19 RX ORDER — ONDANSETRON 4 MG/1
4 TABLET, ORALLY DISINTEGRATING ORAL EVERY 30 MIN PRN
Status: DISCONTINUED | OUTPATIENT
Start: 2017-07-19 | End: 2017-07-20 | Stop reason: HOSPADM

## 2017-07-19 RX ORDER — GLYCOPYRROLATE 0.2 MG/ML
0.2 INJECTION, SOLUTION INTRAMUSCULAR; INTRAVENOUS ONCE
Status: DISCONTINUED | OUTPATIENT
Start: 2017-07-19 | End: 2017-07-19 | Stop reason: HOSPADM

## 2017-07-19 RX ORDER — PROPOFOL 10 MG/ML
INJECTION, EMULSION INTRAVENOUS PRN
Status: DISCONTINUED | OUTPATIENT
Start: 2017-07-19 | End: 2017-07-19

## 2017-07-19 RX ORDER — OXYMETAZOLINE HYDROCHLORIDE 0.05 G/100ML
SPRAY NASAL PRN
Status: DISCONTINUED | OUTPATIENT
Start: 2017-07-19 | End: 2017-07-19 | Stop reason: HOSPADM

## 2017-07-19 RX ORDER — LIDOCAINE HYDROCHLORIDE 40 MG/ML
SOLUTION TOPICAL PRN
Status: DISCONTINUED | OUTPATIENT
Start: 2017-07-19 | End: 2017-07-19 | Stop reason: HOSPADM

## 2017-07-19 RX ORDER — PROPOFOL 10 MG/ML
INJECTION, EMULSION INTRAVENOUS CONTINUOUS PRN
Status: DISCONTINUED | OUTPATIENT
Start: 2017-07-19 | End: 2017-07-19

## 2017-07-19 RX ADMIN — SODIUM CHLORIDE, SODIUM LACTATE, POTASSIUM CHLORIDE, CALCIUM CHLORIDE: 600; 310; 30; 20 INJECTION, SOLUTION INTRAVENOUS at 08:23

## 2017-07-19 RX ADMIN — PROPOFOL 50 MCG/KG/MIN: 10 INJECTION, EMULSION INTRAVENOUS at 09:18

## 2017-07-19 RX ADMIN — LIDOCAINE HYDROCHLORIDE 2 ML: 40 SOLUTION TOPICAL at 08:16

## 2017-07-19 RX ADMIN — OXYMETAZOLINE HYDROCHLORIDE 2 ML: 0.05 SPRAY NASAL at 08:17

## 2017-07-19 RX ADMIN — SODIUM CHLORIDE, SODIUM LACTATE, POTASSIUM CHLORIDE, CALCIUM CHLORIDE: 600; 310; 30; 20 INJECTION, SOLUTION INTRAVENOUS at 08:16

## 2017-07-19 RX ADMIN — PROPOFOL 20 MG: 10 INJECTION, EMULSION INTRAVENOUS at 09:18

## 2017-07-19 RX ADMIN — OXYMETAZOLINE HYDROCHLORIDE 2 SPRAY: 0.05 SPRAY NASAL at 08:11

## 2017-07-19 ASSESSMENT — COPD QUESTIONNAIRES: COPD: 0

## 2017-07-19 ASSESSMENT — LIFESTYLE VARIABLES: TOBACCO_USE: 0

## 2017-07-19 NOTE — IP AVS SNAPSHOT
MRN:[id number]                      After Visit Summary   7/19/2017    Juni Jain    MRN: [id number]           Thank you!     Thank you for choosing [place] for your care. Our goal is always to provide you with excellent care. Hearing back from our patients is one way we can continue to improve our services. Please take a few minutes to complete the written survey that you may receive in the mail after you visit with us. Thank you!        Patient Information     Date Of Birth          1960        About your hospital stay     You were admitted on:  July 19, 2017 You last received care in the:  [facility]    You were discharged on:  July 19, 2017       Who to Call     For medical emergencies, please call 911.  For non-urgent questions about your medical care, please call your primary care provider or clinic, [phone]  For questions related to your surgery, please call your surgery clinic        Attending Provider     Provider Specialty    Liliana Paige MD Otolaryngology       Primary Care Provider Office Phone # Fax #    Ru Davis [phone]      After Care Instructions     Diet Instructions       Resume pre procedure diet            Discharge Instructions       Patient to follow up with Dr. Paige on 7/25/17 as previously scheduled                  Your next 10 appointments already scheduled     Jul 21, 2017  7:40 AM CDT   PHYSICAL with Ru Davis MD   [facility] [place]    [address] [place] [address]   [phone]            Jul 25, 2017  4:30 PM CDT   (Arrive by 4:15 PM)   Return Visit with Liliana Paige MD   [facility] ([facility])    [address]  [address] [facility] [address] [phone]              Further instructions from your care team       [facility]  Home Care Following Anesthesia  For 24 hours after surgery:  1. Get plenty of rest.  A responsible adult must stay with you for at least 24 hours after you leave the surgery center.  2. Do not drive or use heavy equipment.  If you have weakness or tingling, don't drive or use heavy equipment until this feeling goes away.   3. Do not drink alcohol.   4. Avoid strenuous or risky activities.  Ask for help when climbing stairs.  5. You may feel lightheaded.  IF so, sit for a few minutes before standing.  Have someone help you get up.   6. If you have nausea (feel sick to your stomach): Drink only clear liquids such as apple juice, ginger ale, broth or 7-Up.  Rest may also help.  Be sure to drink enough fluids.  Move to a regular diet as you feel able.   7. You may have a slight fever.  Call the doctor if your fever is over 100 F (37.7 C) (taken under the tongue) or lasts longer than 24 hours.  8. You may have a dry mouth, a sore throat, muscle aches or trouble sleeping. These should go away after 24 hours.  9. Do not make important or legal decisions.   Call a doctor for any of the followin. Signs of infection (fever, growing tenderness at the surgery site, a large amount of drainage or bleeding, severe pain, foul-smelling drainage, redness, swelling).  2. It has been over 8 to 10 hours since surgery and you are still not able to urinate (pass water).  3. Headache for over 24 hours.  Your doctor is:  Dr. Liliana Engel, ENT Otolaryngology: [phone]                  Or dial [phone] and ask for the resident on call for:  ENT Otolaryngology  For emergency care, call the:  [facility] [facility]:  [phone] (TTY for hearing impaired: [phone])                Pending Results     No orders found from 2017 to 2017.            Admission Information     Date & Time Provider Department Dept. Phone    2017 Liliana Paige MD [facility]  "Center [phone]      Your Vitals Were     Blood Pressure Pulse Temperature Respirations Height Weight    125/82 71 96.9  F (36.1  C) (Temporal) 16 1.84 m (6' 0.44\") 101.6 kg (224 lb)    Pulse Oximetry BMI (Body Mass Index)                97% 30.01 kg/m2          [employer] Information     [employer] gives you secure access to your electronic health record. If you see a primary care provider, you can also send messages to your care team and make appointments. If you have questions, please call your primary care clinic.  If you do not have a primary care provider, please call [phone] and they will assist you.      [employer] is an electronic gateway that provides easy, online access to your medical records. With [employer], you can request a clinic appointment, read your test results, renew a prescription or communicate with your care team.     To access your existing account, please contact your [facility] or call [phone] for assistance.        Care EveryWhere ID     This is your Care EveryWhere ID. This could be used by other organizations to access your [facility] medical records  [id number]        Equal Access to Services     CHANDLER ALLISON : Haddee Bauer, selma golden, gricelda olivia, te santiago . So [facility] [phone].    ATENCIÓN: Si habla español, tiene a mitchell disposición servicios gratuitos de asistencia lingüística. Cristy al [phone].    We comply with applicable federal civil rights laws and [place] laws. We do not discriminate on the basis of race, color, national origin, age, disability sex, sexual orientation or gender identity.               Review of your medicines      CONTINUE these medicines which have NOT CHANGED        Dose / Directions    ALPRAZolam 0.25 MG tablet   Commonly known as:  XANAX   Used for:  Panic disorder without agoraphobia, Social phobia        as directed for anxiety prior to " presentations and PRN anxiety   Quantity:  15 tablet   Refills:  1       atorvastatin 40 MG tablet   Commonly known as:  LIPITOR   Used for:  Hyperlipidemia LDL goal <130        Dose:  40 mg   Take 1 tablet (40 mg) by mouth daily   Quantity:  90 tablet   Refills:  3       omega-3 fatty acids 1200 MG capsule        Dose:  2 capsule   Take 2 capsules by mouth daily.   Quantity:  180 capsule   Refills:  12       propranolol 10 MG tablet   Commonly known as:  INDERAL   Used for:  Panic disorder without agoraphobia, Social phobia        TAKE 1-2 TABLETS BY MOUTH 90 MINUTES BEFORE PRESENTATION   Quantity:  15 tablet   Refills:  3       venlafaxine 150 MG 24 hr capsule   Commonly known as:  EFFEXOR-XR   Used for:  Panic disorder without agoraphobia, Routine general medical examination at a health care facility        Take 1 capsule by mouth  daily   Quantity:  90 capsule   Refills:  0       VITAMIN D (CHOLECALCIFEROL) PO        Dose:  2-4 tablet   Take 2-4 tablets by mouth daily.   Refills:  0                Protect others around you: Learn how to safely use, store and throw away your medicines at www.disposemymeds.org.             Medication List: This is a list of all your medications and when to take them. Check marks below indicate your daily home schedule. Keep this list as a reference.      Medications           Morning Afternoon Evening Bedtime As Needed    ALPRAZolam 0.25 MG tablet   Commonly known as:  XANAX   as directed for anxiety prior to presentations and PRN anxiety                                atorvastatin 40 MG tablet   Commonly known as:  LIPITOR   Take 1 tablet (40 mg) by mouth daily                                omega-3 fatty acids 1200 MG capsule   Take 2 capsules by mouth daily.                                propranolol 10 MG tablet   Commonly known as:  INDERAL   TAKE 1-2 TABLETS BY MOUTH 90 MINUTES BEFORE PRESENTATION                                venlafaxine 150 MG 24 hr capsule   Commonly  known as:  EFFEXOR-XR   Take 1 capsule by mouth  daily                                VITAMIN D (CHOLECALCIFEROL) PO   Take 2-4 tablets by mouth daily.

## 2017-07-19 NOTE — DISCHARGE INSTRUCTIONS
[facility]  Home Care Following Anesthesia  For 24 hours after surgery:  1. Get plenty of rest.  A responsible adult must stay with you for at least 24 hours after you leave the surgery center.  2. Do not drive or use heavy equipment.  If you have weakness or tingling, don't drive or use heavy equipment until this feeling goes away.   3. Do not drink alcohol.   4. Avoid strenuous or risky activities.  Ask for help when climbing stairs.  5. You may feel lightheaded.  IF so, sit for a few minutes before standing.  Have someone help you get up.   6. If you have nausea (feel sick to your stomach): Drink only clear liquids such as apple juice, ginger ale, broth or 7-Up.  Rest may also help.  Be sure to drink enough fluids.  Move to a regular diet as you feel able.   7. You may have a slight fever.  Call the doctor if your fever is over 100 F (37.7 C) (taken under the tongue) or lasts longer than 24 hours.  8. You may have a dry mouth, a sore throat, muscle aches or trouble sleeping. These should go away after 24 hours.  9. Do not make important or legal decisions.   Call a doctor for any of the followin. Signs of infection (fever, growing tenderness at the surgery site, a large amount of drainage or bleeding, severe pain, foul-smelling drainage, redness, swelling).  2. It has been over 8 to 10 hours since surgery and you are still not able to urinate (pass water).  3. Headache for over 24 hours.  Your doctor is:  Dr. Liliana Engel, ENT Otolaryngology: [phone]                  Or dial [phone] and ask for the resident on call for:  ENT Otolaryngology  For emergency care, call the:  [place] [facility]:  [phone] (TTY for hearing impaired: [phone])

## 2017-07-19 NOTE — ANESTHESIA POSTPROCEDURE EVALUATION
Patient: Juni Jain    Procedure(s):  Drug Induced Sleep Endoscopy - Wound Class: II-Clean Contaminated    Diagnosis:Sleep Apnea  Diagnosis Additional Information: No value filed.    Anesthesia Type:  MAC    Note:  Anesthesia Post Evaluation    Patient location during evaluation: Phase 2  Patient participation: Able to fully participate in evaluation  Level of consciousness: awake and alert  Pain management: adequate  Airway patency: patent  Cardiovascular status: acceptable  Respiratory status: acceptable  Hydration status: acceptable  PONV: none     Anesthetic complications: None          Last vitals:  Vitals:    07/19/17 0800 07/19/17 0940 07/19/17 0955   BP: (!) 137/96 125/82 130/88   Pulse:  71 67   Resp:  16 16   Temp:  36.1  C (96.9  F) 36.1  C (96.9  F)   SpO2:  97% 96%         Electronically Signed By: Rafael Pisano MD  July 19, 2017  1:08 PM

## 2017-07-19 NOTE — OP NOTE
PREOPERATIVE DIAGNOSIS:   obstructive sleep apnea.        POSTOPERATIVE DIAGNOSIS:   obstructive sleep apnea.        PROCEDURE:  Drug-induced sleep endoscopy.        SURGEON:  Liliana Paige MD        ANESTHESIA:  Monitored anesthesia care.        SPECIMENS REMOVED:  None.        COMPLICATIONS:  None.        INDICATIONS:  Patient is a 56 year old male with severe obstructive sleep apnea, who has difficulty tolerating CPAP therapy.        FINDINGS:  V: %           O: Lateral wall encroaching with occasional complete collapse            T : %            E: Passive          DESCRIPTION OF PROCEDURE:  The patient was brought into the operating room, placed on the operating table in supine position.  A member of the Department of Anesthesia was present and supplied the monitored anesthesia care.  A timeout was taken to correctly identify the patient and the procedure.  Once the patient reached an appropriate level of sedation, an endoscope was introduced into the patient's left nostril.  The upper airway was observed.  Findings are as above.  The scope was removed.  The patient was handed back over to Anesthesia and transferred to the PACU in stable condition.          Attestation:  I was present for the entire procedure.    Liliana Paige

## 2017-07-19 NOTE — ANESTHESIA PREPROCEDURE EVALUATION
Juni Jain is a 56 year old male with a PMH of  Sleep Apnea who is scheduled for Procedure(s):  Drug Induced Sleep Endoscopy - Wound Class: II-Clean Contaminated    NPO Status: Adequate at 0915. Will be > 6 hours solids, > 2 hours clear liquids.       Past Surgical History:   Procedure Laterality Date     COLONOSCOPY  2013    Going June 9, 2016     COSMETIC SURGERY  1981    Rhinoplasty/chin implant-Deviated Septum     ENT SURGERY  over the years    CPAP never worked for me. Uncomfortable.       Anesthesia Evaluation     . Pt has had prior anesthetic. Type: General    No history of anesthetic complications          ROS/MED HX    ENT/Pulmonary:     (+)sleep apnea, , . .   (-) tobacco use, asthma and COPD   Neurologic:      (-) CVA, TIA and Neuropathy   Cardiovascular:     (+) ----. : . . . :. . Previous cardiac testing date:7/2017results:Interpretation Summary     The left ventricle is normal in structure, function and size.  The visual ejection fraction is estimated at 55-60%.  The right ventricle is normal in structure, function and size.  There is mild (1+) mitral regurgitation.  No old studiies for comparison.  _____________________________________________________________________________date: results: date: results: date: results:         (-) hypertension, CAD, irregular heartbeat/palpitations and stent   METS/Exercise Tolerance:     Hematologic:        (-) anemia   Musculoskeletal:         GI/Hepatic:     (+) GERD (with certain triggering foods)      (-) liver disease   Renal/Genitourinary:      (-) renal disease   Endo:      (-) Type I DM, Type II DM and thyroid disease   Psychiatric:     (+) psychiatric history anxiety      Infectious Disease:  - neg infectious disease ROS       Malignancy:         Other:                     Physical Exam  Normal systems: cardiovascular, pulmonary and dental    Airway   Mallampati: II  TM distance: >3 FB  Neck ROM: full    Dental     Cardiovascular   Rhythm and rate:  regular and normal      Pulmonary    breath sounds clear to auscultation                    Anesthesia Plan      History & Physical Review  History and physical reviewed and following examination; no interval change.    ASA Status:  2 .        Plan for MAC (with GA backup) with Intravenous induction. Maintenance will be TIVA.  Reason for MAC:  Procedure to face, neck, head or breast  PONV prophylaxis:  Ondansetron       Postoperative Care  Postoperative pain management:  IV analgesics.      Consents  Anesthetic plan, risks, benefits and alternatives discussed with:  Patient..                          .

## 2017-07-19 NOTE — IP AVS SNAPSHOT
[facility]    [address] [place] [address]    Phone:  [phone]    Fax:  [phone]                                       After Visit Summary   7/19/2017    Juni Jain    MRN: [id number]           After Visit Summary Signature Page     I have received my discharge instructions, and my questions have been answered. I have discussed any challenges I see with this plan with the nurse or doctor.    ..........................................................................................................................................  Patient/Patient Representative Signature      ..........................................................................................................................................  Patient Representative Print Name and Relationship to Patient    ..................................................               ................................................  Date                                            Time    ..........................................................................................................................................  Reviewed by Signature/Title    ...................................................              ..............................................  Date                                                            Time

## 2017-07-19 NOTE — ANESTHESIA CARE TRANSFER NOTE
Patient: Juin Jain    Procedure(s):  Drug Induced Sleep Endoscopy - Wound Class: II-Clean Contaminated    Diagnosis: Sleep Apnea  Diagnosis Additional Information: No value filed.    Anesthesia Type:   MAC     Note:  Airway :Room Air  Patient transferred to:Phase II        Vitals: (Last set prior to Anesthesia Care Transfer)    CRNA VITALS  7/19/2017 0909 - 7/19/2017 0943      7/19/2017             Pulse: 71    SpO2: 90 %    Resp Rate (set): 10                Electronically Signed By: GORDO Ahmadi CRNA  July 19, 2017  9:43 AM

## 2017-07-21 ENCOUNTER — OFFICE VISIT (OUTPATIENT)
Dept: FAMILY MEDICINE | Facility: CLINIC | Age: 57
End: 2017-07-21
Payer: COMMERCIAL

## 2017-07-21 VITALS
TEMPERATURE: 96.7 F | BODY MASS INDEX: 30.28 KG/M2 | OXYGEN SATURATION: 98 % | SYSTOLIC BLOOD PRESSURE: 140 MMHG | HEART RATE: 75 BPM | HEIGHT: 72 IN | WEIGHT: 223.6 LBS | DIASTOLIC BLOOD PRESSURE: 82 MMHG

## 2017-07-21 DIAGNOSIS — Z00.00 ROUTINE GENERAL MEDICAL EXAMINATION AT A HEALTH CARE FACILITY: ICD-10-CM

## 2017-07-21 DIAGNOSIS — E78.5 HYPERLIPIDEMIA LDL GOAL <130: Primary | ICD-10-CM

## 2017-07-21 DIAGNOSIS — F41.0 PANIC DISORDER WITHOUT AGORAPHOBIA: ICD-10-CM

## 2017-07-21 DIAGNOSIS — G47.33 OSA (OBSTRUCTIVE SLEEP APNEA): ICD-10-CM

## 2017-07-21 DIAGNOSIS — K21.9 GASTROESOPHAGEAL REFLUX DISEASE WITHOUT ESOPHAGITIS: ICD-10-CM

## 2017-07-21 DIAGNOSIS — F40.10 SOCIAL PHOBIA: ICD-10-CM

## 2017-07-21 DIAGNOSIS — F41.1 GAD (GENERALIZED ANXIETY DISORDER): ICD-10-CM

## 2017-07-21 LAB
ERYTHROCYTE [DISTWIDTH] IN BLOOD BY AUTOMATED COUNT: 13 % (ref 10–15)
HBA1C MFR BLD: 5.4 % (ref 4.3–6)
HCT VFR BLD AUTO: 45.4 % (ref 40–53)
HGB BLD-MCNC: 15.8 G/DL (ref 13.3–17.7)
MCH RBC QN AUTO: 29.5 PG (ref 26.5–33)
MCHC RBC AUTO-ENTMCNC: 34.8 G/DL (ref 31.5–36.5)
MCV RBC AUTO: 85 FL (ref 78–100)
PLATELET # BLD AUTO: 233 10E9/L (ref 150–450)
RBC # BLD AUTO: 5.36 10E12/L (ref 4.4–5.9)
WBC # BLD AUTO: 6.7 10E9/L (ref 4–11)

## 2017-07-21 PROCEDURE — 80061 LIPID PANEL: CPT | Performed by: FAMILY MEDICINE

## 2017-07-21 PROCEDURE — 85027 COMPLETE CBC AUTOMATED: CPT | Performed by: FAMILY MEDICINE

## 2017-07-21 PROCEDURE — 36415 COLL VENOUS BLD VENIPUNCTURE: CPT | Performed by: FAMILY MEDICINE

## 2017-07-21 PROCEDURE — 83036 HEMOGLOBIN GLYCOSYLATED A1C: CPT | Performed by: FAMILY MEDICINE

## 2017-07-21 PROCEDURE — G0103 PSA SCREENING: HCPCS | Performed by: FAMILY MEDICINE

## 2017-07-21 PROCEDURE — 99396 PREV VISIT EST AGE 40-64: CPT | Performed by: FAMILY MEDICINE

## 2017-07-21 RX ORDER — ATORVASTATIN CALCIUM 40 MG/1
40 TABLET, FILM COATED ORAL DAILY
Qty: 90 TABLET | Refills: 3 | Status: SHIPPED | OUTPATIENT
Start: 2017-07-21 | End: 2017-08-14

## 2017-07-21 RX ORDER — ALPRAZOLAM 0.25 MG
TABLET ORAL
Qty: 15 TABLET | Refills: 1 | Status: SHIPPED | OUTPATIENT
Start: 2017-07-21 | End: 2018-09-06

## 2017-07-21 RX ORDER — VENLAFAXINE HYDROCHLORIDE 150 MG/1
CAPSULE, EXTENDED RELEASE ORAL
Qty: 90 CAPSULE | Refills: 1 | Status: SHIPPED | OUTPATIENT
Start: 2017-07-21 | End: 2018-01-02

## 2017-07-21 ASSESSMENT — ANXIETY QUESTIONNAIRES
GAD7 TOTAL SCORE: 7
1. FEELING NERVOUS, ANXIOUS, OR ON EDGE: MORE THAN HALF THE DAYS
6. BECOMING EASILY ANNOYED OR IRRITABLE: SEVERAL DAYS
3. WORRYING TOO MUCH ABOUT DIFFERENT THINGS: SEVERAL DAYS
7. FEELING AFRAID AS IF SOMETHING AWFUL MIGHT HAPPEN: SEVERAL DAYS
IF YOU CHECKED OFF ANY PROBLEMS ON THIS QUESTIONNAIRE, HOW DIFFICULT HAVE THESE PROBLEMS MADE IT FOR YOU TO DO YOUR WORK, TAKE CARE OF THINGS AT HOME, OR GET ALONG WITH OTHER PEOPLE: SOMEWHAT DIFFICULT
2. NOT BEING ABLE TO STOP OR CONTROL WORRYING: SEVERAL DAYS
5. BEING SO RESTLESS THAT IT IS HARD TO SIT STILL: NOT AT ALL

## 2017-07-21 ASSESSMENT — PATIENT HEALTH QUESTIONNAIRE - PHQ9: 5. POOR APPETITE OR OVEREATING: SEVERAL DAYS

## 2017-07-21 NOTE — MR AVS SNAPSHOT
After Visit Summary   7/21/2017    Juni Jain    MRN: [id number]           Patient Information     Date Of Birth          1960        Visit Information        Provider Department      7/21/2017 1:00 PM Ru Davis MD [facility]        Today's Diagnoses     Hyperlipidemia LDL goal <130    -  1    Routine general medical examination at a health care facility        ELVIS (generalized anxiety disorder)        REY (obstructive sleep apnea)        PANIC DISORDER        Social phobia        Gastroesophageal reflux disease without esophagitis          Care Instructions      Preventive Health Recommendations  Male Ages 50 - 64    Yearly exam:             See your health care provider every year in order to  o   Review health changes.   o   Discuss preventive care.    o   Review your medicines if your doctor has prescribed any.     Have a cholesterol test every 5 years, or more frequently if you are at risk for high cholesterol/heart disease.     Have a diabetes test (fasting glucose) every three years. If you are at risk for diabetes, you should have this test more often.     Have a colonoscopy at age 50, or have a yearly FIT test (stool test). These exams will check for colon cancer.      Talk with your health care provider about whether or not a prostate cancer screening test (PSA) is right for you.    You should be tested each year for STDs (sexually transmitted diseases), if you re at risk.     Shots: Get a flu shot each year. Get a tetanus shot every 10 years.     Nutrition:    Eat at least 5 servings of fruits and vegetables daily.     Eat whole-grain bread, whole-wheat pasta and brown rice instead of white grains and rice.     Talk to your provider about Calcium and Vitamin D.     Lifestyle    Exercise for at least 150 minutes a week (30 minutes a day, 5 days a week). This will help you control your weight and prevent disease.     Limit alcohol to one drink per day.     No  "smoking.     Wear sunscreen to prevent skin cancer.     See your dentist every six months for an exam and cleaning.     See your eye doctor every 1 to 2 years.            Follow-ups after your visit        Your next 10 appointments already scheduled     Jul 25, 2017  4:30 PM CDT   (Arrive by 4:15 PM)   Return Visit with Liliana Paige MD   [facility] ([facility] [facility])    [address] [facility] [place] [address] [phone]              Who to contact     If you have questions or need follow up information about today's clinic visit or your schedule please contact [facility] [place] directly at [phone].  Normal or non-critical lab and imaging results will be communicated to you by [employer], letter or phone within 4 business days after the clinic has received the results. If you do not hear from us within 7 days, please contact the clinic through [employer] or phone. If you have a critical or abnormal lab result, we will notify you by phone as soon as possible.  Submit refill requests through [facility] or call your pharmacy and they will forward the refill request to us. Please allow 3 business days for your refill to be completed.          Additional Information About Your Visit        [facility] Information     [facility] gives you secure access to your electronic health record. If you see a primary care provider, you can also send messages to your care team and make appointments. If you have questions, please call your primary care clinic.  If you do not have a primary care provider, please call [phone] and they will assist you.        Care EveryWhere ID     This is your Care EveryWhere ID. This could be used by other organizations to access your [place] medical records  [id number]        Your Vitals Were     Pulse Temperature Height Pulse Oximetry BMI (Body Mass Index)       75 96.7  F (35.9  C) (Tympanic) 6' 0.44\" (1.84 m) 98% 29.96 " kg/m2        Blood Pressure from Last 3 Encounters:   07/21/17 140/82   07/19/17 130/88   07/07/17 104/76    Weight from Last 3 Encounters:   07/21/17 223 lb 9.6 oz (101.4 kg)   07/19/17 224 lb (101.6 kg)   07/07/17 224 lb (101.6 kg)              We Performed the Following     CBC with platelets     Hemoglobin A1c     Lipid Profile (Chol, Trig, HDL, LDL calc)     PSA, screen          Today's Medication Changes          These changes are accurate as of: 7/21/17  1:28 PM.  If you have any questions, ask your nurse or doctor.               These medicines have changed or have updated prescriptions.        Dose/Directions    venlafaxine 150 MG 24 hr capsule   Commonly known as:  EFFEXOR-XR   This may have changed:  See the new instructions.   Used for:  Panic disorder without agoraphobia, Routine general medical examination at a health care facility, Social phobia   Changed by:  Ru Davis MD        Take 1 capsule by mouth  daily   Quantity:  90 capsule   Refills:  1            Where to get your medicines      These medications were sent to [facility] [address] [facility] [place] - [address]  [address], [place] [facility] [place] [address]     Phone:  [phone]     atorvastatin 40 MG tablet    venlafaxine 150 MG 24 hr capsule         Some of these will need a paper prescription and others can be bought over the counter.  Ask your nurse if you have questions.     Bring a paper prescription for each of these medications     ALPRAZolam 0.25 MG tablet                Primary Care Provider Office Phone # Fax #    Ru Davis [phone]       [facility] [place] [address] [place] [address] [address]  [place] [address]        Equal Access to Services     BLANE ALLISON AH: Hadii diana verma Soanibal, waaxda luqadaha, qaybta kaalmada adeegyada, te hendricks. So [facility] [phone].    ATENCIÓN: Si habla español, tiene a mitchell disposición servicios gratuitos de asistencia  lingüística. Cristy al [phone].    We comply with applicable federal civil rights laws and [place] laws. We do not discriminate on the basis of race, color, national origin, age, disability sex, sexual orientation or gender identity.            Thank you!     Thank you for choosing [facility] [place]  for your care. Our goal is always to provide you with excellent care. Hearing back from our patients is one way we can continue to improve our services. Please take a few minutes to complete the written survey that you may receive in the mail after your visit with us. Thank you!             Your Updated Medication List - Protect others around you: Learn how to safely use, store and throw away your medicines at www.disposemymeds.org.          This list is accurate as of: 7/21/17  1:28 PM.  Always use your most recent med list.                   Brand Name Dispense Instructions for use Diagnosis    ALPRAZolam 0.25 MG tablet    XANAX    15 tablet    as directed for anxiety prior to presentations and PRN anxiety    Panic disorder without agoraphobia, Social phobia, Routine general medical examination at a health care facility       atorvastatin 40 MG tablet    LIPITOR    90 tablet    Take 1 tablet (40 mg) by mouth daily    Hyperlipidemia LDL goal <130, Routine general medical examination at a health care facility       omega-3 fatty acids 1200 MG capsule     180 capsule    Take 2 capsules by mouth daily.        propranolol 10 MG tablet    INDERAL    15 tablet    TAKE 1-2 TABLETS BY MOUTH 90 MINUTES BEFORE PRESENTATION    Panic disorder without agoraphobia, Social phobia       venlafaxine 150 MG 24 hr capsule    EFFEXOR-XR    90 capsule    Take 1 capsule by mouth  daily    Panic disorder without agoraphobia, Routine general medical examination at a health care facility, Social phobia       VITAMIN D (CHOLECALCIFEROL) PO      Take 2-4 tablets by mouth daily.

## 2017-07-21 NOTE — NURSING NOTE
"Chief Complaint   Patient presents with     Physical     Fasting       Initial /82 (BP Location: Left arm, Patient Position: Chair, Cuff Size: Adult Regular)  Pulse 75  Temp 96.7  F (35.9  C) (Tympanic)  Ht 6' 0.44\" (1.84 m)  Wt 223 lb 9.6 oz (101.4 kg)  SpO2 98%  BMI 29.96 kg/m2 Estimated body mass index is 29.96 kg/(m^2) as calculated from the following:    Height as of this encounter: 6' 0.44\" (1.84 m).    Weight as of this encounter: 223 lb 9.6 oz (101.4 kg).  Medication Reconciliation: complete     /Morena Aguilar      "

## 2017-07-21 NOTE — PROGRESS NOTES
SUBJECTIVE:   CC: Juni Jain is an 56 year old male who presents for preventative health visit.     Healthy Habits:    Do you get at least three servings of calcium containing foods daily (dairy, green leafy vegetables, etc.)? yes    Amount of exercise or daily activities, outside of work: 3-4 day(s) per week    Problems taking medications regularly No    Medication side effects: No    Have you had an eye exam in the past two years? yes    Do you see a dentist twice per year? yes    Do you have sleep apnea, excessive snoring or daytime drowsiness?yes, treated for sleep apnea.           Getting evaluation done for the sleep apnea.  Slight increase in anxiety, feels social phobia, take inderal or xanax before presentations.    Today's PHQ-2 Score: PHQ-2 ( 1999 [employer]) 7/7/2017 11/15/2016   Q1: Little interest or pleasure in doing things 2 0   Q2: Feeling down, depressed or hopeless 2 0   PHQ-2 Score 4 0   Q1: Little interest or pleasure in doing things - -   Q2: Feeling down, depressed or hopeless - -   PHQ-2 Score - -         Abuse: Current or Past(Physical, Sexual or Emotional)- No  Do you feel safe in your environment - Yes  Social History   Substance Use Topics     Smoking status: Never Smoker     Smokeless tobacco: Never Used      Comment: Do not smoke     Alcohol use 0.0 oz/week      Comment: Weekends         Last PSA:   PSA   Date Value Ref Range Status   05/13/2016 1.27 0 - 4 ug/L Final       Reviewed orders with patient. Reviewed health maintenance and updated orders accordingly - Yes      Reviewed and updated as needed this visit by clinical staff  Allergies  Meds         Reviewed and updated as needed this visit by Provider          Past Medical History:   Diagnosis Date     Anxiety state, unspecified      Coronary artery disease April 2013    some angina here and there. April 2013 at [facility]     Depressive disorder     Anxiety     Depressive disorder, not elsewhere classified       Gastroesophageal reflux disease     somewhat but stopped taking Nexium     Obstructive sleep apnea March 2, 2017    most recent sleep study from [facility], No CPAP     Other and unspecified hyperlipidemia      Sleep apnea 1994    Chronic issue for over 15+ years          ROS:  C: NEGATIVE for fever, chills, change in weight  I: NEGATIVE for worrisome rashes, moles or lesions  E: NEGATIVE for vision changes or irritation  ENT: NEGATIVE for ear, mouth and throat problems  R: NEGATIVE for significant cough or SOB  CV: NEGATIVE for chest pain, palpitations or peripheral edema  GI: NEGATIVE for nausea, abdominal pain, heartburn, or change in bowel habits   male: negative for dysuria, hematuria, decreased urinary stream, erectile dysfunction, urethral discharge  M: NEGATIVE for significant arthralgias or myalgia  N: NEGATIVE for weakness, dizziness or paresthesias  P: NEGATIVE for changes in mood or affect    OBJECTIVE:   There were no vitals taken for this visit.  EXAM:  GENERAL: healthy, alert and no distress  EYES: Eyes grossly normal to inspection, PERRL and conjunctivae and sclerae normal  HENT: ear canals and TM's normal, nose and mouth without ulcers or lesions  NECK: no adenopathy, no asymmetry, masses, or scars and thyroid normal to palpation  RESP: lungs clear to auscultation - no rales, rhonchi or wheezes  CV: regular rate and rhythm, normal S1 S2, no S3 or S4, no murmur, click or rub, no peripheral edema and peripheral pulses strong  ABDOMEN: soft, nontender, no hepatosplenomegaly, no masses and bowel sounds normal  MS: no gross musculoskeletal defects noted, no edema  SKIN: no suspicious lesions or rashes  NEURO: Normal strength and tone, mentation intact and speech normal  PSYCH: mentation appears normal, affect normal/bright    ASSESSMENT/PLAN:   1. Routine general medical examination at a health care facility  Slight elevated liver functions, US is done which is normal slight hepatomegaly but no  oher abnormality, dicussed alcohol in take.  - Lipid Profile (Chol, Trig, HDL, LDL calc)  - PSA, screen  - CBC with platelets  - Hemoglobin A1c  - atorvastatin (LIPITOR) 40 MG tablet; Take 1 tablet (40 mg) by mouth daily  Dispense: 90 tablet; Refill: 3  - venlafaxine (EFFEXOR-XR) 150 MG 24 hr capsule; Take 1 capsule by mouth  daily  Dispense: 90 capsule; Refill: 1  - ALPRAZolam (XANAX) 0.25 MG tablet; as directed for anxiety prior to presentations and PRN anxiety  Dispense: 15 tablet; Refill: 1    2. Hyperlipidemia LDL goal <130    - Lipid Profile (Chol, Trig, HDL, LDL calc)  - atorvastatin (LIPITOR) 40 MG tablet; Take 1 tablet (40 mg) by mouth daily  Dispense: 90 tablet; Refill: 3    3. ELVIS (generalized anxiety disorder)    Takes  Effexor, PHQ is slight high mostly due tot home and work balance, denies nay new acute chanages.  4. REY (obstructive sleep apnea)      5. PANIC DISORDER    - venlafaxine (EFFEXOR-XR) 150 MG 24 hr capsule; Take 1 capsule by mouth  daily  Dispense: 90 capsule; Refill: 1  - ALPRAZolam (XANAX) 0.25 MG tablet; as directed for anxiety prior to presentations and PRN anxiety  Dispense: 15 tablet; Refill: 1    6. Social phobia    - venlafaxine (EFFEXOR-XR) 150 MG 24 hr capsule; Take 1 capsule by mouth  daily  Dispense: 90 capsule; Refill: 1  - ALPRAZolam (XANAX) 0.25 MG tablet; as directed for anxiety prior to presentations and PRN anxiety  Dispense: 15 tablet; Refill: 1    7. Gastroesophageal reflux disease without esophagitis  Stable.      COUNSELING:  Reviewed preventive health counseling, as reflected in patient instructions       Regular exercise       Healthy diet/nutrition    BP Screening:   Last 3 BP Readings:    BP Readings from Last 3 Encounters:   07/21/17 140/82   07/19/17 130/88   07/07/17 104/76       The following was recommended to the patient:  Re-screen BP within a year and recommended lifestyle modifications       reports that he has never smoked. He has never used smokeless  "tobacco.      Estimated body mass index is 30.01 kg/(m^2) as calculated from the following:    Height as of 7/19/17: 6' 0.44\" (1.84 m).    Weight as of 7/19/17: 224 lb (101.6 kg).       Counseling Resources:  ATP IV Guidelines  Pooled Cohorts Equation Calculator  FRAX Risk Assessment  ICSI Preventive Guidelines  Dietary Guidelines for Americans, 2010  USDA's MyPlate  ASA Prophylaxis  Lung CA Screening    Ru Davis MD  [facility]  "

## 2017-07-22 LAB
CHOLEST SERPL-MCNC: 209 MG/DL
HDLC SERPL-MCNC: 41 MG/DL
LDLC SERPL CALC-MCNC: 133 MG/DL
NONHDLC SERPL-MCNC: 168 MG/DL
PSA SERPL-ACNC: 1.23 UG/L (ref 0–4)
TRIGL SERPL-MCNC: 174 MG/DL

## 2017-07-22 ASSESSMENT — PATIENT HEALTH QUESTIONNAIRE - PHQ9: SUM OF ALL RESPONSES TO PHQ QUESTIONS 1-9: 15

## 2017-07-22 ASSESSMENT — ANXIETY QUESTIONNAIRES: GAD7 TOTAL SCORE: 7

## 2017-07-25 ENCOUNTER — OFFICE VISIT (OUTPATIENT)
Dept: OTOLARYNGOLOGY | Facility: CLINIC | Age: 57
End: 2017-07-25

## 2017-07-25 DIAGNOSIS — G47.33 OSA (OBSTRUCTIVE SLEEP APNEA): Primary | ICD-10-CM

## 2017-07-25 RX ORDER — DEXAMETHASONE SODIUM PHOSPHATE 10 MG/ML
10 INJECTION, SOLUTION INTRAMUSCULAR; INTRAVENOUS ONCE
Status: CANCELLED | OUTPATIENT
Start: 2017-07-25 | End: 2017-07-25

## 2017-07-25 ASSESSMENT — PAIN SCALES - GENERAL: PAINLEVEL: NO PAIN (0)

## 2017-07-25 NOTE — PROGRESS NOTES
History of Present Illness:  Patient returns to clinic for follow-up after drug-induced sleep endoscopy. Sleep endoscopy showed anterior posterior collapse at the level of the velum as well as tongue base.  Patient has severe obstructive sleep apnea based off of a home sleep study from March 2, 2017. The overall RDI was 40. He had a vast majority of obstructive events with only one mixed apnea. He has been intolerant to CPAP due to air leaks and unrefreshing sleep.    Assessment/Plan:  Patient has severe obstructive sleep apnea. He is intolerant to positive airway pressure therapy. He does meet all four criteria for upper airway stimulation therapy, right hypoglossal neurostimulator implant. I discussed with the patient what is involved in the surgery as well as risks and, potential competitions. We discussed outcomes. He would like to proceed.    MT/ms

## 2017-07-25 NOTE — NURSING NOTE
Relevant Diagnosis: sleep apnea  Teaching Topic: Pre op teaching for inspire implantation    Person(s) involved in teaching:  Patient     Teaching Concerns Addressed:  Pre op teaching included the need for an H&P, NPO status pre op, hospital routines, expected recovery, activity  restrictions, antimicrobial scrub, s/s of infection, pain control methods and the importance of follow up appointments.  The patient voiced an understanding of all instructions and will call with questions.     Motivation Level:  Asks Questions:   Yes  Eager to Learn:   Yes  Cooperative:   Yes  Receptive (willing/able to accept information):   Yes     Patient  demonstrates understanding of the following:  Reason for the appointment, diagnosis and treatment plan:   Yes  Knowledge of proper use of medications and conditions for which they are ordered (with special attention to potential side effects or drug interactions):   Yes  Which situations necessitate calling provider and whom to contact:   Yes        Proper use and care of  (medical equip, care aids, etc.):   NA  Nutritional needs and diet plan:   Yes  Pain management techniques:   Yes  Patient instructed on hand hygiene:  Yes  How and/when to access community resources:   NA     Infection Prevention:  Patient   demonstrates understanding of the following:  Surgical procedure site care taught   Signs and symptoms of infection taught Yes  Wound care taught Yes     Instructional Materials Used/Given: Pre op booklet.

## 2017-07-25 NOTE — NURSING NOTE
Chief Complaint   Patient presents with     RECHECK     Return Post op 7/19/2017     Pt states no pain today.    MELY Montesinos LPN

## 2017-07-25 NOTE — PATIENT INSTRUCTIONS
1.  You were seen in the ENT Clinic today by Dr. Paige.  If you have any questions or concerns after your appointment, please call [phone].  Press option #1 for scheduling related needs.  Press option #3 for Nurse advice.  2.  Plan is to return to move forward with Inspire implantation.  3.  We will submit this to your insurance company and you will be called with a date and time once it has been approved.  4.  Once you receive a surgery date, please schedule a pre op physical with your primary care MD.  5.  You will be scheduled to see Dr. Paige one week post op for suture removal.  6.  Inspire activation will take place at the sleep center with your sleep medicine provider.  7.  The below information is about your recovery period after implantation.  8. Surgery scheduler is Va at [phone]  9.  Nurse coordinator is Liliana at [phone]      The Inspire implantation procedure is considered outpatient-pending no complications, you will leave the same day.    Incisions that you can expect.  Right upper neck-5 cm in length  Right lower neck, near your clavicle-5 cm in length  Right outer rib cage, midway down-5 cm in length      Recovery is usually in 2 phases.    Weeks 1-2   You will be sore mostly from the rib incision  No lifting your arm above shoulder level  No lifting greater than 15 lbs  Return to the clinic 7-10 days after the procedure for suture removal    Weeks 2-4  Modified restrictions, okay to increase activity as tolerated.  Okay to lift your arms above shoulder level  No lifting greater than 25 lbs.    Activation:This will usually take place one month after surgery. This will be done at the Sleep Center. All follow up appointments after he activation will also happen at the sleep center and they will arrange for those.

## 2017-07-25 NOTE — MR AVS SNAPSHOT
After Visit Summary   7/25/2017    Juni Jain    MRN: [id number]           Patient Information     Date Of Birth          1960        Visit Information        Provider Department      7/25/2017 4:30 PM Liliana Paige MD [facility]        Today's Diagnoses     REY (obstructive sleep apnea)    -  1      Care Instructions    1.  You were seen in the ENT Clinic today by Dr. Paige.  If you have any questions or concerns after your appointment, please call [phone].  Press option #1 for scheduling related needs.  Press option #3 for Nurse advice.  2.  Plan is to return to move forward with Inspire implantation.  3.  We will submit this to your insurance company and you will be called with a date and time once it has been approved.  4.  Once you receive a surgery date, please schedule a pre op physical with your primary care MD.  5.  You will be scheduled to see Dr. Paige one week post op for suture removal.  6.  Inspire activation will take place at the sleep center with your sleep medicine provider.  7.  The below information is about your recovery period after implantation.  8. Surgery scheduler is Va at [phone]  9.  Nurse coordinator is Liliana at [phone]      The Inspire implantation procedure is considered outpatient-pending no complications, you will leave the same day.    Incisions that you can expect.  Right upper neck-5 cm in length  Right lower neck, near your clavicle-5 cm in length  Right outer rib cage, midway down-5 cm in length      Recovery is usually in 2 phases.    Weeks 1-2   You will be sore mostly from the rib incision  No lifting your arm above shoulder level  No lifting greater than 15 lbs  Return to the clinic 7-10 days after the procedure for suture removal    Weeks 2-4  Modified restrictions, okay to increase activity as tolerated.  Okay to lift your arms above shoulder level  No lifting greater than 25  lbs.    Activation:This will usually take place one month after surgery. This will be done at the Sleep Center. All follow up appointments after he activation will also happen at the sleep center and they will arrange for those.                  Follow-ups after your visit        Who to contact     Please call your clinic at [phone] to:    Ask questions about your health    Make or cancel appointments    Discuss your medicines    Learn about your test results    Speak to your doctor   If you have compliments or concerns about an experience at your clinic, or if you wish to file a complaint, please contact [facility] Patient Relations at [phone] or email us at [email or web address]         Additional Information About Your Visit        [email or web address]     M/[employer]-COM gives you secure access to your electronic health record. If you see a primary care provider, you can also send messages to your care team and make appointments. If you have questions, please call your primary care clinic.  If you do not have a primary care provider, please call [phone] and they will assist you.      [employer]COM is an electronic gateway that provides easy, online access to your medical records. With [employer], you can request a clinic appointment, read your test results, renew a prescription or communicate with your care team.     To access your existing account, please contact your [facility] or call [phone] for assistance.        Care EveryWhere ID     This is your Care EveryWhere ID. This could be used by other organizations to access your [place] medical records  [id number]         Blood Pressure from Last 3 Encounters:   07/21/17 140/82   07/19/17 130/88   07/07/17 104/76    Weight from Last 3 Encounters:   07/21/17 101.4 kg (223 lb 9.6 oz)   07/19/17 101.6 kg (224 lb)   07/07/17 101.6 kg (224 lb)              We Performed the Following      Venus-Operative Worksheet (ENT Adult Default Surgery Request)        Primary Care Provider Office Phone # Fax #    Ru Davis [phone]       [facility] [place] [address]  [place] [address]        Equal Access to Services     [facility]: Hadii aad ku hadasho Soomaali, waaxda luqadaha, qaybta kaalmada adeegyada, waxay idiin hayaan adeeg kharash la'aan ah. So [facility] [phone].    ATENCIÓN: Si habla español, tiene a mitchell disposición servicios gratuitos de asistencia lingüística. [facility] [phone].    We comply with applicable federal civil rights laws and [place] laws. We do not discriminate on the basis of race, color, national origin, age, disability sex, sexual orientation or gender identity.            Thank you!     Thank you for choosing [facility]  for your care. Our goal is always to provide you with excellent care. Hearing back from our patients is one way we can continue to improve our services. Please take a few minutes to complete the written survey that you may receive in the mail after your visit with us. Thank you!             Your Updated Medication List - Protect others around you: Learn how to safely use, store and throw away your medicines at www.disposemymeds.org.          This list is accurate as of: 7/25/17  4:47 PM.  Always use your most recent med list.                   Brand Name Dispense Instructions for use Diagnosis    ALPRAZolam 0.25 MG tablet    XANAX    15 tablet    as directed for anxiety prior to presentations and PRN anxiety    Panic disorder without agoraphobia, Social phobia, Routine general medical examination at a health care facility       atorvastatin 40 MG tablet    LIPITOR    90 tablet    Take 1 tablet (40 mg) by mouth daily    Hyperlipidemia LDL goal <130, Routine general medical examination at a health care facility       omega-3 fatty acids 1200 MG capsule     180 capsule    Take 2 capsules by mouth  daily.        propranolol 10 MG tablet    INDERAL    15 tablet    TAKE 1-2 TABLETS BY MOUTH 90 MINUTES BEFORE PRESENTATION    Panic disorder without agoraphobia, Social phobia       venlafaxine 150 MG 24 hr capsule    EFFEXOR-XR    90 capsule    Take 1 capsule by mouth  daily    Panic disorder without agoraphobia, Routine general medical examination at a health care facility, Social phobia       VITAMIN D (CHOLECALCIFEROL) PO      Take 2-4 tablets by mouth daily.

## 2017-07-25 NOTE — LETTER
7/25/2017       RE: Juni Jain  [address] [place] [address]     Dear Colleague,    Thank you for referring your patient, Juni Jain, to the [facility] at [facility]. Please see a copy of my visit note below.    History of Present Illness:  Patient returns to clinic for follow-up after drug-induced sleep endoscopy. Sleep endoscopy showed anterior posterior collapse at the level of the velum as well as tongue base.  Patient has severe obstructive sleep apnea based off of a home sleep study from March 2, 2017. The overall RDI was 40. He had a vast majority of obstructive events with only one mixed apnea. He has been intolerant to CPAP due to air leaks and unrefreshing sleep.    Assessment/Plan:  Patient has severe obstructive sleep apnea. He is intolerant to positive airway pressure therapy. He does meet all four criteria for upper airway stimulation therapy, right hypoglossal neurostimulator implant. I discussed with the patient what is involved in the surgery as well as risks and, potential competitions. We discussed outcomes. He would like to proceed.    JH/ms    Again, thank you for allowing me to participate in the care of your patient.      Sincerely,    Liliana Paige MD

## 2017-08-14 DIAGNOSIS — Z00.00 ROUTINE GENERAL MEDICAL EXAMINATION AT A HEALTH CARE FACILITY: ICD-10-CM

## 2017-08-14 DIAGNOSIS — E78.5 HYPERLIPIDEMIA LDL GOAL <130: ICD-10-CM

## 2017-08-14 NOTE — TELEPHONE ENCOUNTER
Atorvastatin     Last Written Prescription Date: 7/21/17  Last Fill Quantity: 90, # refills: 3  Last Office Visit with [facility], [facility] or [facility] prescribing provider: 7/21/17       Lab Results   Component Value Date    CHOL 209 07/21/2017     Lab Results   Component Value Date    HDL 41 07/21/2017     Lab Results   Component Value Date     07/21/2017     Lab Results   Component Value Date    TRIG 174 07/21/2017     Lab Results   Component Value Date    CHOLHDLRATIO 4.3 05/22/2015     Vane Archer CMA

## 2017-08-15 NOTE — TELEPHONE ENCOUNTER
Last rx was sent to [facility]. My Chart sent to the patient confirming that he wants to switch to [facility]. Terrie Hudson RN

## 2017-08-16 RX ORDER — ATORVASTATIN CALCIUM 40 MG/1
TABLET, FILM COATED ORAL
Qty: 90 TABLET | Refills: 2 | Status: SHIPPED | OUTPATIENT
Start: 2017-08-16 | End: 2018-05-02

## 2017-08-16 NOTE — TELEPHONE ENCOUNTER
Patient responded that he does want his prescription transferred to [facility].   Prescription approved per [facility] Refill Protocol.  Rx cancelled at [facility].  Terrie Hudson RN

## 2017-08-17 DIAGNOSIS — G47.33 OSA (OBSTRUCTIVE SLEEP APNEA): Primary | ICD-10-CM

## 2017-08-17 NOTE — NURSING NOTE
Per Dr. Paige, referral needs to be placed for a sleep medicine consult at [facility]. This will be done to establish care pre Inspire implantation.  Orders, [facility] from [place] [facility] and clinic notes from Dr. Paige were all faxed.  Detailed VM was left on the patient's VM. Left contact information for scheduling of this also.    Liliana Bingham R.N., B.S.N.  Nurse Coordinator Head & Neck Surgery  [phone]  8/17/2017 12:26 PM

## 2017-08-29 ENCOUNTER — CARE COORDINATION (OUTPATIENT)
Dept: OTOLARYNGOLOGY | Facility: CLINIC | Age: 57
End: 2017-08-29

## 2017-08-29 NOTE — PROGRESS NOTES
Call was received from the patient expressing difficulty with getting a Noran consult scheduled prior to his upcoming Inspire implant next week. Per Dr. Paige, if unable to be seen pre operatively, okay to be seen within the month after surgery. This was discussed with Fritz and he voiced an understanding.  Pre op instructions were discussed. H&P to be done on 8/31.  Patient has my contact information for questions/concerns.    Liliana Bingham R.N., B.S.N.  Nurse Coordinator Head & Neck Surgery  [phone]  8/29/2017 8:26 AM

## 2017-08-31 ENCOUNTER — OFFICE VISIT (OUTPATIENT)
Dept: FAMILY MEDICINE | Facility: CLINIC | Age: 57
End: 2017-08-31
Payer: COMMERCIAL

## 2017-08-31 VITALS
SYSTOLIC BLOOD PRESSURE: 120 MMHG | HEART RATE: 56 BPM | BODY MASS INDEX: 29.95 KG/M2 | TEMPERATURE: 97.4 F | OXYGEN SATURATION: 98 % | DIASTOLIC BLOOD PRESSURE: 74 MMHG | WEIGHT: 226 LBS | HEIGHT: 73 IN

## 2017-08-31 DIAGNOSIS — Z01.818 PREOP GENERAL PHYSICAL EXAM: Primary | ICD-10-CM

## 2017-08-31 DIAGNOSIS — G47.33 OSA (OBSTRUCTIVE SLEEP APNEA): ICD-10-CM

## 2017-08-31 PROCEDURE — 99214 OFFICE O/P EST MOD 30 MIN: CPT | Performed by: FAMILY MEDICINE

## 2017-08-31 NOTE — NURSING NOTE
"Chief Complaint   Patient presents with     Pre-Op Exam       Initial /74  Pulse 56  Temp 97.4  F (36.3  C) (Tympanic)  Ht 6' 1\" (1.854 m)  Wt 226 lb (102.5 kg)  SpO2 98%  BMI 29.82 kg/m2 Estimated body mass index is 29.82 kg/(m^2) as calculated from the following:    Height as of this encounter: 6' 1\" (1.854 m).    Weight as of this encounter: 226 lb (102.5 kg).  Medication Reconciliation: complete     Vane Archer CMA      "

## 2017-08-31 NOTE — PROGRESS NOTES
[facility]  [address] [address]  [phone]  Dept: [phone]    PRE-OP EVALUATION:  Today's date: 2017    Juni Jain (: 1960) presents for pre-operative evaluation assessment as requested by Dr. Paige.  He requires evaluation and anesthesia risk assessment prior to undergoing surgery/procedure for treatment of  .  Proposed procedure: Right hypoglossal nerve stimulator implant    Date of Surgery/ Procedure: 17  Time of Surgery/ Procedure:   Hospital/Surgical Facility: [facility] and surgery center  Fax number for surgical facility: [phone]  Primary Physician: Ru Davis  Type of Anesthesia Anticipated: General    Patient has a Health Care Directive or Living Will:  NO    1. NO - Do you have a history of heart attack, stroke, stent, bypass or surgery on an artery in the head, neck, heart or legs?  2. NO - Do you ever have any pain or discomfort in your chest?  3. NO - Do you have a history of  Heart Failure?  4. NO - Are you troubled by shortness of breath when: walking on the level, up a slight hill or at night?  5. NO - Do you currently have a cold, bronchitis or other respiratory infection?  6. NO - Do you have a cough, shortness of breath or wheezing?  7. NO - Do you sometimes get pains in the calves of your legs when you walk?  8. NO - Do you or anyone in your family have previous history of blood clots?  9. NO - Do you or does anyone in your family have a serious bleeding problem such as prolonged bleeding following surgeries or cuts?  10. NO - Have you ever had problems with anemia or been told to take iron pills?  11. NO - Have you had any abnormal blood loss such as black, tarry or bloody stools, or abnormal vaginal bleeding?  12. NO - Have you ever had a blood transfusion?  13. NO - Have you or any of your relatives ever had problems with anesthesia?  14. YES - DO YOU HAVE SLEEP APNEA,  EXCESSIVE SNORING OR DAYTIME DROWSINESS?   15. NO - Do you have any prosthetic heart valves?  16. NO - Do you have prosthetic joints?  17. NO - Is there any chance that you may be pregnant?        HPI:                                                      Brief HPI related to upcoming procedure:       HYPERLIPIDEMIA - Patient has a long history of significant Hyperlipidemia requiring medication for treatment with recent good control. Patient reports no problems or side effects with the medication.                                                                                                                                                       .  SLEEP PROBLEM - Patient has a longstanding history of snoring and fatigue of moderate severity. Patient has tried OTC medications with limited success.                                                                                                                                         .    MEDICAL HISTORY:                                                    Patient Active Problem List    Diagnosis Date Noted     REY (obstructive sleep apnea) 08/31/2017     Priority: Medium     ELVIS (generalized anxiety disorder) 05/13/2016     Priority: Medium     Chronic folliculitis 04/12/2016     Priority: Medium     HYPERLIPIDEMIA LDL GOAL <130 10/31/2010     Priority: Medium     Esophageal reflux 10/05/2005     Priority: Medium     Depressive disorder, not elsewhere classified 10/04/2005     Priority: Medium     Panic disorder without agoraphobia 11/18/2003     Priority: Medium      Past Medical History:   Diagnosis Date     Anxiety state, unspecified      Coronary artery disease April 2013    some angina here and there. April 2013 at [facility]     Depressive disorder     Anxiety     Depressive disorder, not elsewhere classified      Gastroesophageal reflux disease     somewhat but stopped taking Nexium     Obstructive sleep apnea March 2, 2017    most recent sleep study from  [facility], No CPAP     Other and unspecified hyperlipidemia      Sleep apnea 1994    Chronic issue for over 15+ years     Past Surgical History:   Procedure Laterality Date     COLONOSCOPY  2013    Going June 9, 2016     COSMETIC SURGERY  1981    Rhinoplasty/chin implant-Deviated Septum     ENDOSCOPY DRUG INDUCED SLEEP Bilateral 7/19/2017    Procedure: ENDOSCOPY DRUG INDUCED SLEEP;  Drug Induced Sleep Endoscopy;  Surgeon: Liliana Paige MD;  Location: [facility] OR     ENT SURGERY  over the years    CPAP never worked for me. Uncomfortable.     Current Outpatient Prescriptions   Medication Sig Dispense Refill     atorvastatin (LIPITOR) 40 MG tablet Take 1 tablet by mouth  daily 90 tablet 2     venlafaxine (EFFEXOR-XR) 150 MG 24 hr capsule Take 1 capsule by mouth  daily 90 capsule 1     ALPRAZolam (XANAX) 0.25 MG tablet as directed for anxiety prior to presentations and PRN anxiety 15 tablet 1     propranolol (INDERAL) 10 MG tablet TAKE 1-2 TABLETS BY MOUTH 90 MINUTES BEFORE PRESENTATION 15 tablet 3     VITAMIN D, CHOLECALCIFEROL, PO Take 2-4 tablets by mouth daily.       Omega-3 Fatty Acids (FISH OIL) 1200 MG capsule Take 2 capsules by mouth daily. 180 capsule 12     OTC products: None, except as noted above     Allergies   Allergen Reactions     No Known Drug Allergies       Latex Allergy: NO    Social History   Substance Use Topics     Smoking status: Never Smoker     Smokeless tobacco: Never Used      Comment: Do not smoke     Alcohol use 0.0 oz/week      Comment: Weekends     History   Drug Use No       REVIEW OF SYSTEMS:                                                    C: NEGATIVE for fever, chills, change in weight  I: NEGATIVE for worrisome rashes, moles or lesions  E: NEGATIVE for vision changes or irritation  E/M: NEGATIVE for ear, mouth and throat problems  R: NEGATIVE for significant cough or SOB  B: NEGATIVE for masses, tenderness or discharge  CV: NEGATIVE for chest pain, palpitations or  "peripheral edema  GI: NEGATIVE for nausea, abdominal pain, heartburn, or change in bowel habits  : NEGATIVE for frequency, dysuria, or hematuria  M: NEGATIVE for significant arthralgias or myalgia  N: NEGATIVE for weakness, dizziness or paresthesias  E: NEGATIVE for temperature intolerance, skin/hair changes  H: NEGATIVE for bleeding problems  P: NEGATIVE for changes in mood or affect    EXAM:                                                    /74  Pulse 56  Temp 97.4  F (36.3  C) (Tympanic)  Ht 6' 1\" (1.854 m)  Wt 226 lb (102.5 kg)  SpO2 98%  BMI 29.82 kg/m2    GENERAL APPEARANCE: healthy, alert and no distress     EYES: EOMI,  PERRL     HENT: ear canals and TM's normal and nose and mouth without ulcers or lesions     NECK: no adenopathy, no asymmetry, masses, or scars and thyroid normal to palpation     RESP: lungs clear to auscultation - no rales, rhonchi or wheezes     CV: regular rates and rhythm, normal S1 S2, no S3 or S4 and no murmur, click or rub     ABDOMEN:  soft, nontender, no HSM or masses and bowel sounds normal     MS: extremities normal- no gross deformities noted, no evidence of inflammation in joints, FROM in all extremities.     SKIN: no suspicious lesions or rashes     NEURO: Normal strength and tone, sensory exam grossly normal, mentation intact and speech normal     PSYCH: mentation appears normal. and affect normal/bright     LYMPHATICS: No axillary, cervical, or supraclavicular nodes    DIAGNOSTICS:                                                        Recent Labs   Lab Test  07/21/17   1308  07/07/17   0903  05/13/16   0759   04/10/13   2258   HGB  15.8  16.7   --    --   15.8   PLT  233   --    --    --   178   NA   --   140  139   < >  143   POTASSIUM   --   4.3  4.1   < >  4.1   CR   --   1.17  1.11   < >  1.27*   A1C  5.4   --    --    --    --     < > = values in this interval not displayed.        IMPRESSION:                                                    Reason for " surgery/procedure:   Diagnosis/reason for consult:   1. Preop general physical exam    2. REY (obstructive sleep apnea)          The proposed surgical procedure is considered LOW risk.    REVISED CARDIAC RISK INDEX  The patient has the following serious cardiovascular risks for perioperative complications such as (MI, PE, VFib and 3  AV Block):  No serious cardiac risks  INTERPRETATION: 0 risks: Class I (very low risk - 0.4% complication rate)    The patient has the following additional risks for perioperative complications:  No identified additional risks      ICD-10-CM    1. Preop general physical exam Z01.818    2. REY (obstructive sleep apnea) G47.33        RECOMMENDATIONS:                                                      --Consult hospital rounder / IM to assist post-op medical management    --Patient is to take all scheduled medications on the day of surgery EXCEPT for modifications listed below.  Can hold fish oil 5-7 days proper to surgery, can hold morning does of cholesterol and Effexor.resume when able to tolerate food.    APPROVAL GIVEN to proceed with proposed procedure, without further diagnostic evaluation       Signed Electronically by: Ru Davis MD    Copy of this evaluation report is provided to requesting physician.    [facility] Preop Guidelines

## 2017-08-31 NOTE — MR AVS SNAPSHOT
After Visit Summary   8/31/2017    Juni Jain    MRN: [id number]           Patient Information     Date Of Birth          1960        Visit Information        Provider Department      8/31/2017 8:00 AM Ru Davis MD [facility]        Today's Diagnoses     Preop general physical exam    -  1    REY (obstructive sleep apnea)          Care Instructions      Before Your Surgery      Call your surgeon if there is any change in your health. This includes signs of a cold or flu (such as a sore throat, runny nose, cough, rash or fever).    Do not smoke, drink alcohol or take over the counter medicine (unless your surgeon or primary care doctor tells you to) for the 24 hours before and after surgery.    If you take prescribed drugs: Follow your doctor s orders about which medicines to take and which to stop until after surgery.    Eating and drinking prior to surgery: follow the instructions from your surgeon    Take a shower or bath the night before surgery. Use the soap your surgeon gave you to gently clean your skin. If you do not have soap from your surgeon, use your regular soap. Do not shave or scrub the surgery site.  Wear clean pajamas and have clean sheets on your bed.           Follow-ups after your visit        Your next 10 appointments already scheduled     Sep 01, 2017 12:00 PM CDT   (Arrive by 11:45 AM)   PAC PHONE RN ASSESSMENT with  Pac Rn   [facility] ([facility])    [address] [place], [place] [id number] [phone]           Note: this is not an onsite visit; there is no need to come to the facility.            Sep 06, 2017   Procedure with Liliana Paige MD   [facility] ([facility])    [address] [place] [address]  5th [facility] [id number] [phone]           Located in the [facility] [place] at [facility]  [facility] [address].   parking is very convenient and highly recommended.  is a $6 flat rate fee.  Both  and self parkers should enter the main arrival plaza from [facility]; parking attendants will direct you based on your parking preference.            Sep 14, 2017  8:45 AM CDT   (Arrive by 8:30 AM)   Return Visit with Liliana Paige MD   [facility] ([facility])    [address] [facility]  4th Floor  [facility] [address] [phone]              Who to contact     If you have questions or need follow up information about today's clinic visit or your schedule please contact [facility] [place] directly at [phone].  Normal or non-critical lab and imaging results will be communicated to you by Blackst[employer], letter or phone within 4 business days after the clinic has received the results. If you do not hear from us within 7 days, please contact the clinic through Blackstraphart or phone. If you have a critical or abnormal lab result, we will notify you by phone as soon as possible.  Submit refill requests through Visual[employer] or call your pharmacy and they will forward the refill request to us. Please allow 3 business days for your refill to be completed.          Additional Information About Your Visit        [employer] Information     [employer] gives you secure access to your electronic health record. If you see a primary care provider, you can also send messages to your care team and make appointments. If you have questions, please call your primary care clinic.  If you do not have a primary care provider, please call [phone] and they will assist you.        Care EveryWhere ID     This is your Care EveryWhere ID. This could be used by other organizations to access your [place] medical records  [id number]        Your Vitals Were     Pulse Temperature Height Pulse Oximetry BMI (Body Mass Index)       56 97.4  F (36.3  C)  "(Tympanic) 6' 1\" (1.854 m) 98% 29.82 kg/m2        Blood Pressure from Last 3 Encounters:   08/31/17 120/74   07/21/17 140/82   07/19/17 130/88    Weight from Last 3 Encounters:   08/31/17 226 lb (102.5 kg)   07/21/17 223 lb 9.6 oz (101.4 kg)   07/19/17 224 lb (101.6 kg)              Today, you had the following     No orders found for display       Primary Care Provider Office Phone # Fax #    uR Davis [phone]       [address]  [place] [address]        Equal Access to Services     [facility]: Hadii diana hare hadmary joo Soanibal, waaxda luqadaha, qaybta kaalmada adeegyada, te santiago . So [facility] [phone].    ATENCIÓN: Si habla español, tiene a mitchell disposición servicios gratuitos de asistencia lingüística. [facility] [phone].    We comply with applicable federal civil rights laws and [place] laws. We do not discriminate on the basis of race, color, national origin, age, disability sex, sexual orientation or gender identity.            Thank you!     [facility]  [facility] your care. Our goal is always to provide you with excellent care. Hearing back from our patients is one way we can continue to improve our services. Please take a few minutes to complete the written survey that you may receive in the mail after your visit with us. Thank you!             Your Updated Medication List - Protect others around you: Learn how to safely use, store and throw away your medicines at www.disposemymeds.org.          This list is accurate as of: 8/31/17  8:11 AM.  Always use your most recent med list.                   Brand Name Dispense Instructions for use Diagnosis    ALPRAZolam 0.25 MG tablet    XANAX    15 tablet    as directed for anxiety prior to presentations and PRN anxiety    Panic disorder without agoraphobia, Social phobia, Routine general medical examination at a health care facility       atorvastatin 40 MG " tablet    LIPITOR    90 tablet    Take 1 tablet by mouth  daily    Hyperlipidemia LDL goal <130, Routine general medical examination at a health care facility       omega-3 fatty acids 1200 MG capsule     180 capsule    Take 2 capsules by mouth daily.        propranolol 10 MG tablet    INDERAL    15 tablet    TAKE 1-2 TABLETS BY MOUTH 90 MINUTES BEFORE PRESENTATION    Panic disorder without agoraphobia, Social phobia       venlafaxine 150 MG 24 hr capsule    EFFEXOR-XR    90 capsule    Take 1 capsule by mouth  daily    Panic disorder without agoraphobia, Routine general medical examination at a health care facility, Social phobia       VITAMIN D (CHOLECALCIFEROL) PO      Take 2-4 tablets by mouth daily.

## 2017-09-01 ENCOUNTER — ALLIED HEALTH/NURSE VISIT (OUTPATIENT)
Dept: SURGERY | Facility: CLINIC | Age: 57
End: 2017-09-01

## 2017-09-05 ENCOUNTER — ANESTHESIA EVENT (OUTPATIENT)
Dept: SURGERY | Facility: AMBULATORY SURGERY CENTER | Age: 57
End: 2017-09-05

## 2017-09-06 ENCOUNTER — SURGERY (OUTPATIENT)
Age: 57
End: 2017-09-06

## 2017-09-06 ENCOUNTER — ANESTHESIA (OUTPATIENT)
Dept: SURGERY | Facility: AMBULATORY SURGERY CENTER | Age: 57
End: 2017-09-06

## 2017-09-06 ENCOUNTER — HOSPITAL ENCOUNTER (OUTPATIENT)
Facility: AMBULATORY SURGERY CENTER | Age: 57
End: 2017-09-06
Attending: OTOLARYNGOLOGY

## 2017-09-06 VITALS
TEMPERATURE: 97.4 F | OXYGEN SATURATION: 96 % | DIASTOLIC BLOOD PRESSURE: 101 MMHG | BODY MASS INDEX: 29.69 KG/M2 | WEIGHT: 224 LBS | RESPIRATION RATE: 16 BRPM | SYSTOLIC BLOOD PRESSURE: 158 MMHG | HEART RATE: 94 BPM | HEIGHT: 73 IN

## 2017-09-06 DIAGNOSIS — G47.33 OSA (OBSTRUCTIVE SLEEP APNEA): Primary | ICD-10-CM

## 2017-09-06 DEVICE — IMPLANTABLE DEVICE: Type: IMPLANTABLE DEVICE | Site: NECK | Status: FUNCTIONAL

## 2017-09-06 RX ORDER — EPHEDRINE SULFATE 50 MG/ML
INJECTION, SOLUTION INTRAMUSCULAR; INTRAVENOUS; SUBCUTANEOUS PRN
Status: DISCONTINUED | OUTPATIENT
Start: 2017-09-06 | End: 2017-09-06

## 2017-09-06 RX ORDER — MEPERIDINE HYDROCHLORIDE 25 MG/ML
12.5 INJECTION INTRAMUSCULAR; INTRAVENOUS; SUBCUTANEOUS
Status: DISCONTINUED | OUTPATIENT
Start: 2017-09-06 | End: 2017-09-07 | Stop reason: HOSPADM

## 2017-09-06 RX ORDER — SODIUM CHLORIDE, SODIUM LACTATE, POTASSIUM CHLORIDE, CALCIUM CHLORIDE 600; 310; 30; 20 MG/100ML; MG/100ML; MG/100ML; MG/100ML
INJECTION, SOLUTION INTRAVENOUS CONTINUOUS
Status: DISCONTINUED | OUTPATIENT
Start: 2017-09-06 | End: 2017-09-06 | Stop reason: HOSPADM

## 2017-09-06 RX ORDER — FENTANYL CITRATE 50 UG/ML
25-50 INJECTION, SOLUTION INTRAMUSCULAR; INTRAVENOUS
Status: DISCONTINUED | OUTPATIENT
Start: 2017-09-06 | End: 2017-09-06 | Stop reason: HOSPADM

## 2017-09-06 RX ORDER — ONDANSETRON 2 MG/ML
4 INJECTION INTRAMUSCULAR; INTRAVENOUS EVERY 30 MIN PRN
Status: DISCONTINUED | OUTPATIENT
Start: 2017-09-06 | End: 2017-09-07 | Stop reason: HOSPADM

## 2017-09-06 RX ORDER — LIDOCAINE 40 MG/G
CREAM TOPICAL
Status: DISCONTINUED | OUTPATIENT
Start: 2017-09-06 | End: 2017-09-06 | Stop reason: HOSPADM

## 2017-09-06 RX ORDER — LIDOCAINE HYDROCHLORIDE AND EPINEPHRINE 10; 10 MG/ML; UG/ML
INJECTION, SOLUTION INFILTRATION; PERINEURAL PRN
Status: DISCONTINUED | OUTPATIENT
Start: 2017-09-06 | End: 2017-09-06 | Stop reason: HOSPADM

## 2017-09-06 RX ORDER — ONDANSETRON 4 MG/1
4 TABLET, ORALLY DISINTEGRATING ORAL EVERY 30 MIN PRN
Status: DISCONTINUED | OUTPATIENT
Start: 2017-09-06 | End: 2017-09-07 | Stop reason: HOSPADM

## 2017-09-06 RX ORDER — CEFAZOLIN SODIUM 1 G/3ML
1 INJECTION, POWDER, FOR SOLUTION INTRAMUSCULAR; INTRAVENOUS SEE ADMIN INSTRUCTIONS
Status: DISCONTINUED | OUTPATIENT
Start: 2017-09-06 | End: 2017-09-06 | Stop reason: HOSPADM

## 2017-09-06 RX ORDER — FENTANYL CITRATE 50 UG/ML
INJECTION, SOLUTION INTRAMUSCULAR; INTRAVENOUS PRN
Status: DISCONTINUED | OUTPATIENT
Start: 2017-09-06 | End: 2017-09-06

## 2017-09-06 RX ORDER — HYDROCODONE BITARTRATE AND ACETAMINOPHEN 5; 325 MG/1; MG/1
1 TABLET ORAL ONCE
Status: COMPLETED | OUTPATIENT
Start: 2017-09-06 | End: 2017-09-06

## 2017-09-06 RX ORDER — OXYMETAZOLINE HYDROCHLORIDE 0.05 G/100ML
2 SPRAY NASAL 2 TIMES DAILY
Status: DISCONTINUED | OUTPATIENT
Start: 2017-09-06 | End: 2017-09-06 | Stop reason: HOSPADM

## 2017-09-06 RX ORDER — LIDOCAINE HYDROCHLORIDE 20 MG/ML
INJECTION, SOLUTION INFILTRATION; PERINEURAL PRN
Status: DISCONTINUED | OUTPATIENT
Start: 2017-09-06 | End: 2017-09-06

## 2017-09-06 RX ORDER — DEXAMETHASONE SODIUM PHOSPHATE 10 MG/ML
10 INJECTION, SOLUTION INTRAMUSCULAR; INTRAVENOUS ONCE
Status: DISCONTINUED | OUTPATIENT
Start: 2017-09-06 | End: 2017-09-06 | Stop reason: HOSPADM

## 2017-09-06 RX ORDER — NALOXONE HYDROCHLORIDE 0.4 MG/ML
.1-.4 INJECTION, SOLUTION INTRAMUSCULAR; INTRAVENOUS; SUBCUTANEOUS
Status: DISCONTINUED | OUTPATIENT
Start: 2017-09-06 | End: 2017-09-07 | Stop reason: HOSPADM

## 2017-09-06 RX ORDER — PROPOFOL 10 MG/ML
INJECTION, EMULSION INTRAVENOUS CONTINUOUS PRN
Status: DISCONTINUED | OUTPATIENT
Start: 2017-09-06 | End: 2017-09-06

## 2017-09-06 RX ORDER — HYDROCODONE BITARTRATE AND ACETAMINOPHEN 5; 325 MG/1; MG/1
1-2 TABLET ORAL EVERY 4 HOURS PRN
Qty: 30 TABLET | Refills: 0 | Status: SHIPPED | OUTPATIENT
Start: 2017-09-06 | End: 2017-09-12

## 2017-09-06 RX ORDER — SODIUM CHLORIDE, SODIUM LACTATE, POTASSIUM CHLORIDE, CALCIUM CHLORIDE 600; 310; 30; 20 MG/100ML; MG/100ML; MG/100ML; MG/100ML
INJECTION, SOLUTION INTRAVENOUS CONTINUOUS
Status: DISCONTINUED | OUTPATIENT
Start: 2017-09-06 | End: 2017-09-07 | Stop reason: HOSPADM

## 2017-09-06 RX ORDER — ONDANSETRON 2 MG/ML
INJECTION INTRAMUSCULAR; INTRAVENOUS PRN
Status: DISCONTINUED | OUTPATIENT
Start: 2017-09-06 | End: 2017-09-06

## 2017-09-06 RX ORDER — ACETAMINOPHEN 325 MG/1
975 TABLET ORAL ONCE
Status: COMPLETED | OUTPATIENT
Start: 2017-09-06 | End: 2017-09-06

## 2017-09-06 RX ORDER — GABAPENTIN 300 MG/1
300 CAPSULE ORAL ONCE
Status: COMPLETED | OUTPATIENT
Start: 2017-09-06 | End: 2017-09-06

## 2017-09-06 RX ORDER — DEXAMETHASONE SODIUM PHOSPHATE 10 MG/ML
INJECTION, SOLUTION INTRAMUSCULAR; INTRAVENOUS PRN
Status: DISCONTINUED | OUTPATIENT
Start: 2017-09-06 | End: 2017-09-06

## 2017-09-06 RX ORDER — SODIUM CHLORIDE, SODIUM LACTATE, POTASSIUM CHLORIDE, CALCIUM CHLORIDE 600; 310; 30; 20 MG/100ML; MG/100ML; MG/100ML; MG/100ML
INJECTION, SOLUTION INTRAVENOUS CONTINUOUS PRN
Status: DISCONTINUED | OUTPATIENT
Start: 2017-09-06 | End: 2017-09-06

## 2017-09-06 RX ORDER — CEPHALEXIN 500 MG/1
500 CAPSULE ORAL 3 TIMES DAILY
Qty: 21 CAPSULE | Refills: 0 | Status: SHIPPED | OUTPATIENT
Start: 2017-09-06 | End: 2017-09-13

## 2017-09-06 RX ORDER — PROPOFOL 10 MG/ML
INJECTION, EMULSION INTRAVENOUS PRN
Status: DISCONTINUED | OUTPATIENT
Start: 2017-09-06 | End: 2017-09-06

## 2017-09-06 RX ADMIN — PROPOFOL 50 MG: 10 INJECTION, EMULSION INTRAVENOUS at 08:34

## 2017-09-06 RX ADMIN — PROPOFOL 200 MG: 10 INJECTION, EMULSION INTRAVENOUS at 08:29

## 2017-09-06 RX ADMIN — EPHEDRINE SULFATE 10 MG: 50 INJECTION, SOLUTION INTRAMUSCULAR; INTRAVENOUS; SUBCUTANEOUS at 09:29

## 2017-09-06 RX ADMIN — ACETAMINOPHEN 975 MG: 325 TABLET ORAL at 07:27

## 2017-09-06 RX ADMIN — FENTANYL CITRATE 50 MCG: 50 INJECTION, SOLUTION INTRAMUSCULAR; INTRAVENOUS at 08:24

## 2017-09-06 RX ADMIN — EPHEDRINE SULFATE 15 MG: 50 INJECTION, SOLUTION INTRAMUSCULAR; INTRAVENOUS; SUBCUTANEOUS at 09:16

## 2017-09-06 RX ADMIN — PROPOFOL 200 MCG/KG/MIN: 10 INJECTION, EMULSION INTRAVENOUS at 08:34

## 2017-09-06 RX ADMIN — SODIUM CHLORIDE, SODIUM LACTATE, POTASSIUM CHLORIDE, CALCIUM CHLORIDE: 600; 310; 30; 20 INJECTION, SOLUTION INTRAVENOUS at 07:29

## 2017-09-06 RX ADMIN — PROPOFOL 50 MG: 10 INJECTION, EMULSION INTRAVENOUS at 08:37

## 2017-09-06 RX ADMIN — HYDROCODONE BITARTRATE AND ACETAMINOPHEN 1 TABLET: 5; 325 TABLET ORAL at 13:09

## 2017-09-06 RX ADMIN — PROPOFOL 200 MG: 10 INJECTION, EMULSION INTRAVENOUS at 08:43

## 2017-09-06 RX ADMIN — Medication 0.5 MG: at 10:11

## 2017-09-06 RX ADMIN — Medication 0.5 MG: at 12:37

## 2017-09-06 RX ADMIN — OXYMETAZOLINE HYDROCHLORIDE 2 SPRAY: 0.05 SPRAY NASAL at 08:01

## 2017-09-06 RX ADMIN — LIDOCAINE HYDROCHLORIDE AND EPINEPHRINE 5 ML: 10; 10 INJECTION, SOLUTION INFILTRATION; PERINEURAL at 09:00

## 2017-09-06 RX ADMIN — CEFAZOLIN SODIUM 1 G: 1 INJECTION, POWDER, FOR SOLUTION INTRAMUSCULAR; INTRAVENOUS at 10:26

## 2017-09-06 RX ADMIN — GABAPENTIN 300 MG: 300 CAPSULE ORAL at 07:27

## 2017-09-06 RX ADMIN — PROPOFOL 50 MG: 10 INJECTION, EMULSION INTRAVENOUS at 08:38

## 2017-09-06 RX ADMIN — DEXAMETHASONE SODIUM PHOSPHATE 8 MG: 10 INJECTION, SOLUTION INTRAMUSCULAR; INTRAVENOUS at 08:40

## 2017-09-06 RX ADMIN — SODIUM CHLORIDE, SODIUM LACTATE, POTASSIUM CHLORIDE, CALCIUM CHLORIDE: 600; 310; 30; 20 INJECTION, SOLUTION INTRAVENOUS at 11:51

## 2017-09-06 RX ADMIN — LIDOCAINE HYDROCHLORIDE AND EPINEPHRINE 6 ML: 10; 10 INJECTION, SOLUTION INFILTRATION; PERINEURAL at 10:30

## 2017-09-06 RX ADMIN — Medication 200 ML: at 09:37

## 2017-09-06 RX ADMIN — LIDOCAINE HYDROCHLORIDE 100 MG: 20 INJECTION, SOLUTION INFILTRATION; PERINEURAL at 08:24

## 2017-09-06 RX ADMIN — PROPOFOL 50 MG: 10 INJECTION, EMULSION INTRAVENOUS at 08:32

## 2017-09-06 RX ADMIN — SODIUM CHLORIDE, SODIUM LACTATE, POTASSIUM CHLORIDE, CALCIUM CHLORIDE: 600; 310; 30; 20 INJECTION, SOLUTION INTRAVENOUS at 08:21

## 2017-09-06 RX ADMIN — ONDANSETRON 4 MG: 2 INJECTION INTRAMUSCULAR; INTRAVENOUS at 12:02

## 2017-09-06 NOTE — ANESTHESIA PREPROCEDURE EVALUATION
Anesthesia Evaluation     . Pt has had prior anesthetic. Type: MAC and General           ROS/MED HX    ENT/Pulmonary:     (+)sleep apnea, REY risk factors snores loudly, obese, observed stopped breathing doesn't use CPAP , . .    Neurologic:  - neg neurologic ROS     Cardiovascular:     (+) Dyslipidemia, --CAD, --. : . . . :. . Previous cardiac testing Echodate:7/13/17results:  The left ventricle is normal in structure, function and size.  The visual ejection fraction is estimated at 55-60%.  The right ventricle is normal in structure, function and size.  There is mild (1+) mitral regurgitation.  No old studiies for comparison.date: results: date: results: date: results:          METS/Exercise Tolerance:  >4 METS   Hematologic:  - neg hematologic  ROS       Musculoskeletal:  - neg musculoskeletal ROS       GI/Hepatic: Comment: Sporadic symptoms, not taking PPIs     (+) GERD       Renal/Genitourinary:  - ROS Renal section negative       Endo:  - neg endo ROS       Psychiatric:     (+) psychiatric history depression and anxiety      Infectious Disease:  - neg infectious disease ROS       Malignancy:      - no malignancy   Other:    - neg other ROS             Procedure: Procedure(s):  Right Hypoglossal Nerve (Inspire) Implantation - Wound Class: I-Clean    HPI: Juni Jain is a 56 year old male with history of REY intolerant to CPAP mask, who presents electively for a right hypoglossal nerve implantation under general anesthesia.     PMHx/PSHx:  Past Medical History:   Diagnosis Date     Anxiety state, unspecified      Coronary artery disease April 2013    some angina here and there. April 2013 at [facility]     Depressive disorder     Anxiety     Depressive disorder, not elsewhere classified      Gastroesophageal reflux disease     somewhat but stopped taking Nexium     Obstructive sleep apnea March 2, 2017    most recent sleep study from [facility], No CPAP     Other and unspecified  hyperlipidemia      Sleep apnea 1994    Chronic issue for over 15+ years       Past Surgical History:   Procedure Laterality Date     COLONOSCOPY  2013    Going June 9, 2016     COSMETIC SURGERY  1981    Rhinoplasty/chin implant-Deviated Septum     ENDOSCOPY DRUG INDUCED SLEEP Bilateral 7/19/2017    Procedure: ENDOSCOPY DRUG INDUCED SLEEP;  Drug Induced Sleep Endoscopy;  Surgeon: Liliana Paige MD;  Location: [facility] OR     ENT SURGERY  over the years    CPAP never worked for me. Uncomfortable.         Current Outpatient Prescriptions on File Prior to Encounter:  atorvastatin (LIPITOR) 40 MG tablet Take 1 tablet by mouth  daily   venlafaxine (EFFEXOR-XR) 150 MG 24 hr capsule Take 1 capsule by mouth  daily   ALPRAZolam (XANAX) 0.25 MG tablet as directed for anxiety prior to presentations and PRN anxiety   propranolol (INDERAL) 10 MG tablet TAKE 1-2 TABLETS BY MOUTH 90 MINUTES BEFORE PRESENTATION   VITAMIN D, CHOLECALCIFEROL, PO Take 2-4 tablets by mouth daily.   Omega-3 Fatty Acids (FISH OIL) 1200 MG capsule Take 2 capsules by mouth daily.     No current facility-administered medications on file prior to encounter.     Social Hx:   Social History   Substance Use Topics     Smoking status: Never Smoker     Smokeless tobacco: Never Used      Comment: Do not smoke     Alcohol use 0.0 oz/week      Comment: Weekends       Allergies:   Allergies   Allergen Reactions     No Known Drug Allergies        NPO Status: > 8 hours     Labs:    BMP:  Recent Labs   Lab Test  07/07/17   0903   NA  140   POTASSIUM  4.3   CHLORIDE  107   CO2  21   BUN  13   CR  1.17   GLC  109*   MADELINE  8.9     CBC:   Recent Labs   Lab Test  07/21/17   1308   WBC  6.7   RBC  5.36   HGB  15.8   HCT  45.4   MCV  85   MCH  29.5   MCHC  34.8   RDW  13.0   PLT  233     Coags:  No results for input(s): INR, PTT, FIBR in the last 85898 hours.      Physical Exam  Normal systems: pulmonary and dental    Airway   Mallampati: II  TM distance: >3 FB  Neck ROM:  full    Dental     Cardiovascular   Rhythm and rate: regular and normal      Pulmonary    breath sounds clear to auscultation              Anesthesia Plan      History & Physical Review  History and physical reviewed and following examination; no interval change.    ASA Status:  2 .    NPO Status:  > 8 hours    Plan for General and ETT with Intravenous and Propofol induction. Maintenance will be TIVA.    PONV prophylaxis:  Ondansetron (or other 5HT-3) and Dexamethasone or Solumedrol       Postoperative Care  Postoperative pain management:  Multi-modal analgesia.      Consents  Anesthetic plan, risks, benefits and alternatives discussed with:  Patient.  Use of blood products discussed: No .   .        Airway: No prior documented intubations     Special Considerations:  Nasal intubation. Hx of rhinoplasty due to deviated septum. Right nare used for preoperative endoscopy by ENT without difficulty.     Plan:   - ASA 2  - Nasal intubation   - GETA with standard ASA monitors, TIVA  - PIV x 1   - Antibiotics per ENT surgery  - PONV prophylaxis  - Pain management with Fentanyl/dilaudid boluses     Santos Hunter MD  Anesthesiology Resident  [phone]

## 2017-09-06 NOTE — DISCHARGE INSTRUCTIONS
[facility]  Home Care Following Anesthesia  For 24 hours after surgery:  1. Get plenty of rest.  A responsible adult must stay with you for at least 24 hours after you leave the surgery center.  2. Do not drive or use heavy equipment.  If you have weakness or tingling, don't drive or use heavy equipment until this feeling goes away.   3. Do not drink alcohol.   4. Avoid strenuous or risky activities.  Ask for help when climbing stairs.  5. You may feel lightheaded.  IF so, sit for a few minutes before standing.  Have someone help you get up.   6. If you have nausea (feel sick to your stomach): Drink only clear liquids such as apple juice, ginger ale, broth or 7-Up.  Rest may also help.  Be sure to drink enough fluids.  Move to a regular diet as you feel able.   7. You may have a slight fever.  Call the doctor if your fever is over 100 F (37.7 C) (taken under the tongue) or lasts longer than 24 hours.  8. You may have a dry mouth, a sore throat, muscle aches or trouble sleeping. These should go away after 24 hours.  9. Do not make important or legal decisions.   Tips for taking pain medications  To get the best pain relief possible, remember these points:    Take pain medications as directed, before pain becomes severe.    Pain medication can upset your stomach: taking it with food may help.    Constipation is a common side effect of pain medication. Drink plenty of  fluids.    Eat foods high in fiber. Take a stool softener if recommended by your doctor or pharmacist.    Do not drink alcohol, drive or operate machinery while taking pain medications.    Ask about other ways to control pain, such as with heat, ice or relaxation.    Tylenol/Acetaminophen Consumption  To help encourage the safe use of acetaminophen, the makers of TYLENOL  have lowered the maximum daily dose for single-ingredient Extra Strength TYLENOL  (acetaminophen) products sold in the [place] from 8 pills per day  (4,000 mg) to 6 pills per day (3,000 mg). The dosing interval has also changed from 2 pills every 4-6 hours to 2 pills every 6 hours.    If you feel your pain relief is insufficient, you may take Tylenol/Acetaminophen in addition to your narcotic pain medication.     Be careful not to exceed 3,000 mg of Tylenol/Acetaminophen in a 24 hour period from all sources.    If you are taking extra strength Tylenol/acetaminophen (500 mg), the maximum dose is 6 tablets in 24 hours.    If you are taking regular strength acetaminophen (325 mg), the maximum dose is 9 tablets in 24 hours.    Call a doctor for any of the followin. Signs of infection (fever, growing tenderness at the surgery site, a large amount of drainage or bleeding, severe pain, foul-smelling drainage, redness, swelling).  2. It has been over 8 to 10 hours since surgery and you are still not able to urinate (pass water).  3. Headache for over 24 hours.      Your doctor is:  Dr. Liliana Valentino), ENT Otolaryngology: [phone]               Or dial [phone] and ask for the resident on call for:  ENT Otolaryngology  For emergency care, call the:  [place] [facility]:  [phone] (TTY for hearing impaired: [phone])

## 2017-09-06 NOTE — IP AVS SNAPSHOT
[facility]    [address] [place] [id number]    Phone:  [phone]    Fax:  [phone]                                       After Visit Summary   9/6/2017    Juni Jain    MRN: [id number]           After Visit Summary Signature Page     I have received my discharge instructions, and my questions have been answered. I have discussed any challenges I see with this plan with the nurse or doctor.    ..........................................................................................................................................  Patient/Patient Representative Signature      ..........................................................................................................................................  Patient Representative Print Name and Relationship to Patient    ..................................................               ................................................  Date                                            Time    ..........................................................................................................................................  Reviewed by Signature/Title    ...................................................              ..............................................  Date                                                            Time

## 2017-09-06 NOTE — BRIEF OP NOTE
[facility]    Brief Operative Note    Pre-operative diagnosis: Obstructive Sleep Apnea  Post-operative diagnosis Obstructive Sleep Apnea  Procedure: Procedure(s):  Right Hypoglossal Nerve (Inspire) Implantation with Facial Nerve Monitoring - Wound Class: I-Clean  Surgeon: Surgeon(s) and Role:     * Liliana Paige MD - Primary  Anesthesia: General   Estimated blood loss: 20 mL  Drains: None  Specimens: * No specimens in log *  Findings:   Normal hypoglossal nerve anatomy  Complications: None.  Implants: Inspire implant.

## 2017-09-06 NOTE — IP AVS SNAPSHOT
MRN:[id number]                      After Visit Summary   9/6/2017    Juni Jain    MRN: [id number]           Thank you!     Thank you for choosing [facility] for your care. Our goal is always to provide you with excellent care. Hearing back from our patients is one way we can continue to improve our services. Please take a few minutes to complete the written survey that you may receive in the mail after you visit with us. Thank you!        Patient Information     Date Of Birth          1960        About your hospital stay     You were admitted on:  September 6, 2017 You last received care in the:  [facility]    You were discharged on:  September 6, 2017       Who to Call     For medical emergencies, please call 911.  For non-urgent questions about your medical care, please call your primary care provider or clinic, [phone]  For questions related to your surgery, please call your surgery clinic        Attending Provider     Provider Liliana Lee MD Otolaryngology       Primary Care Provider Office Phone # Fax #    [facility] [phone]      After Care Instructions     Diet Instructions       Resume pre procedure diet            Discharge Instructions       Patient to follow up with Dr. Paige on 9/12 as previously scheduled                  Your next 10 appointments already scheduled     Sep 12, 2017  3:30 PM CDT   (Arrive by 3:15 PM)   Return Visit with Liliana Paige MD   [facility] ([email or web address])    [address] [place] [id number] [phone]              Further instructions from your care team       [facility]  Home Care Following Anesthesia  For 24 hours after surgery:  1. Get plenty of rest.  A responsible adult must stay with you for at least 24 hours after you leave the surgery  [place].  2. Do not drive or use heavy equipment.  If you have weakness or tingling, don't drive or use heavy equipment until this feeling goes away.   3. Do not drink alcohol.   4. Avoid strenuous or risky activities.  Ask for help when climbing stairs.  5. You may feel lightheaded.  IF so, sit for a few minutes before standing.  Have someone help you get up.   6. If you have nausea (feel sick to your stomach): Drink only clear liquids such as apple juice, ginger ale, broth or 7-Up.  Rest may also help.  Be sure to drink enough fluids.  Move to a regular diet as you feel able.   7. You may have a slight fever.  Call the doctor if your fever is over 100 F (37.7 C) (taken under the tongue) or lasts longer than 24 hours.  8. You may have a dry mouth, a sore throat, muscle aches or trouble sleeping. These should go away after 24 hours.  9. Do not make important or legal decisions.   Tips for taking pain medications  To get the best pain relief possible, remember these points:    Take pain medications as directed, before pain becomes severe.    Pain medication can upset your stomach: taking it with food may help.    Constipation is a common side effect of pain medication. Drink plenty of  fluids.    Eat foods high in fiber. Take a stool softener if recommended by your doctor or pharmacist.    Do not drink alcohol, drive or operate machinery while taking pain medications.    Ask about other ways to control pain, such as with heat, ice or relaxation.    Tylenol/Acetaminophen Consumption  To help encourage the safe use of acetaminophen, the makers of TYLENOL  have lowered the maximum daily dose for single-ingredient Extra Strength TYLENOL  (acetaminophen) products sold in the [place] from 8 pills per day (4,000 mg) to 6 pills per day (3,000 mg). The dosing interval has also changed from 2 pills every 4-6 hours to 2 pills every 6 hours.    If you feel your pain relief is insufficient, you may take Tylenol/Acetaminophen in  "addition to your narcotic pain medication.     Be careful not to exceed 3,000 mg of Tylenol/Acetaminophen in a 24 hour period from all sources.    If you are taking extra strength Tylenol/acetaminophen (500 mg), the maximum dose is 6 tablets in 24 hours.    If you are taking regular strength acetaminophen (325 mg), the maximum dose is 9 tablets in 24 hours.    Call a doctor for any of the followin. Signs of infection (fever, growing tenderness at the surgery site, a large amount of drainage or bleeding, severe pain, foul-smelling drainage, redness, swelling).  2. It has been over 8 to 10 hours since surgery and you are still not able to urinate (pass water).  3. Headache for over 24 hours.      Your doctor is:  Dr. Liliana Moran, ENT Otolaryngology: [phone]               Or dial [phone] and ask for the resident on call for:  ENT Otolaryngology  For emergency care, call the:  [facility]:  [phone] (TTY for hearing impaired: [phone])                  Pending Results     No orders found from 2017 to 2017.            Admission Information     Date & Time Provider Department Dept. Phone    2017 Liliana Paige MD [facility] [phone]      Your Vitals Were     Blood Pressure Pulse Temperature Respirations Height Weight    142/100 65 97.7  F (36.5  C) (Oral) 16 1.854 m (6' 1\") 101.6 kg (224 lb)    Pulse Oximetry BMI (Body Mass Index)                94% 29.55 kg/m2          [facility] Information     [facility] gives you secure access to your electronic health record. If you see a primary care provider, you can also send messages to your care team and make appointments. If you have questions, please call your primary care clinic.  If you do not have a primary care provider, please call [phone] and they will assist you.      [facility] is an electronic gateway that provides easy, online access to your medical records. With " Juliano, you can request a clinic appointment, read your test results, renew a prescription or communicate with your care team.     To access your existing account, please contact your [facility] or call [phone] for assistance.        Care EveryWhere ID     This is your Care EveryWhere ID. This could be used by other organizations to access your [facility] medical records  [id number]        Equal Access to Services     CHANDLER ALLISON : Hadii diana ku hadasho Soomaali, waaxda luqadaha, qaybta kaalmada adeegyada, waxronnell perrin hayania parraamilcaryvon hendricks. So [facility] [phone].    ATENCIÓN: Si habla esptori, tiene a mitchell disposición servicios gratuitos de asistencia lingüística. Cristy al [phone].    We comply with applicable federal civil rights laws and [place] laws. We do not discriminate on the basis of race, color, national origin, age, disability sex, sexual orientation or gender identity.               Review of your medicines      START taking        Dose / Directions    HYDROcodone-acetaminophen 5-325 MG per tablet   Commonly known as:  NORCO   Used for:  REY (obstructive sleep apnea)        Dose:  1-2 tablet   Take 1-2 tablets by mouth every 4 hours as needed for other (Moderate to Severe Pain)   Quantity:  30 tablet   Refills:  0         CONTINUE these medicines which have NOT CHANGED        Dose / Directions    ALPRAZolam 0.25 MG tablet   Commonly known as:  XANAX   Used for:  Panic disorder without agoraphobia, Social phobia, Routine general medical examination at a health care facility        as directed for anxiety prior to presentations and PRN anxiety   Quantity:  15 tablet   Refills:  1       atorvastatin 40 MG tablet   Commonly known as:  LIPITOR   Used for:  Hyperlipidemia LDL goal <130, Routine general medical examination at a health care facility        Take 1 tablet by mouth  daily   Quantity:  90 tablet   Refills:  2       omega-3 fatty acids 1200 MG capsule         Dose:  2 capsule   Take 2 capsules by mouth daily.   Quantity:  180 capsule   Refills:  12       propranolol 10 MG tablet   Commonly known as:  INDERAL   Used for:  Panic disorder without agoraphobia, Social phobia        TAKE 1-2 TABLETS BY MOUTH 90 MINUTES BEFORE PRESENTATION   Quantity:  15 tablet   Refills:  3       venlafaxine 150 MG 24 hr capsule   Commonly known as:  EFFEXOR-XR   Used for:  Panic disorder without agoraphobia, Routine general medical examination at a health care facility, Social phobia        Take 1 capsule by mouth  daily   Quantity:  90 capsule   Refills:  1       VITAMIN D (CHOLECALCIFEROL) PO        Dose:  2-4 tablet   Take 2-4 tablets by mouth daily.   Refills:  0            Where to get your medicines      Some of these will need a paper prescription and others can be bought over the counter. Ask your nurse if you have questions.     Bring a paper prescription for each of these medications     HYDROcodone-acetaminophen 5-325 MG per tablet                Protect others around you: Learn how to safely use, store and throw away your medicines at www.disposemymeds.org.             Medication List: This is a list of all your medications and when to take them. Check marks below indicate your daily home schedule. Keep this list as a reference.      Medications           Morning Afternoon Evening Bedtime As Needed    ALPRAZolam 0.25 MG tablet   Commonly known as:  XANAX   as directed for anxiety prior to presentations and PRN anxiety                                atorvastatin 40 MG tablet   Commonly known as:  LIPITOR   Take 1 tablet by mouth  daily                                HYDROcodone-acetaminophen 5-325 MG per tablet   Commonly known as:  NORCO   Take 1-2 tablets by mouth every 4 hours as needed for other (Moderate to Severe Pain)                                omega-3 fatty acids 1200 MG capsule   Take 2 capsules by mouth daily.                                propranolol 10 MG  tablet   Commonly known as:  INDERAL   TAKE 1-2 TABLETS BY MOUTH 90 MINUTES BEFORE PRESENTATION                                venlafaxine 150 MG 24 hr capsule   Commonly known as:  EFFEXOR-XR   Take 1 capsule by mouth  daily                                VITAMIN D (CHOLECALCIFEROL) PO   Take 2-4 tablets by mouth daily.

## 2017-09-06 NOTE — OP NOTE
PREOPERATIVE DIAGNOSIS:  Severe obstructive sleep apnea, intolerant to positive airway pressure therapy.        POSTOPERATIVE DIAGNOSIS:  Severe obstructive sleep apnea, intolerant to positive airway pressure therapy.        PROCEDURES:    1.  Twelfth cranial nerve/hypoglossal stimulation implant along with placement of a right pleural respiration sensor.    2.  Electronic analysis of implanted neurostimulator and pulse generator system.        SURGEON:  Liliana Paige MD        ASSISTANTS:  Jhon Galdamez MD      ANESTHESIA:  General endotracheal.        SPECIMENS REMOVED:  None.        COMPLICATIONS:  None.        ESTIMATED BLOOD LOSS:  20ml        INDICATIONS:  Patient is a 56 year old hein with a history of severe obstructive sleep apnea.  Patient has been intolerant to positive airway pressure therapy and has not been able to achieve consistent benefit from medical management.  They clinical polysomnographic and endoscopic screening criteria for hypoglossal nerve stimulation implant.        FINDINGS:  The patient had a normal 12th cranial nerve.  The C1 branch was incorporated.  The implanted pulse generator was placed in the right chest wall.  The sensing lead was placed at approximately the fifth intercostal space.        DESCRIPTION OF PROCEDURE:  The patient was brought into the operating room, placed on the operating table in supine position.  A member of the Department of Anesthesia was present and intubated the patient via nasal intubation.  The tube was secured and the table was turned 90 degrees.  A shoulder roll and the right chest bump placed for positioning.  The arms were protected with padding.  The incisions for the neck, chest wall and IPG incisions were marked preprocedurely.  The neck and chest wall incisions were injected with approximately 5 mL of 1% lidocaine with epinephrine.  Monitoring electrodes were placed in the genioglossus and hyoglossal muscle of the tongue.  The electrodes were then  connected to the NIM box for intraoperative nerve monitoring.  The patient was then prepped and draped in our normal sterile fashion with the head turned to the left.  A timeout was taken to correctly identify the patient and the procedure.  A modified submandibular gland incision was made in the right upper neck approximately 1 fingerbreadth below the mandible and in a natural skin crease.  Dissection was then carried down through the subcutaneous tissues and the platysma muscles were divided.  We eventually encountered the anterior border of the submandibular gland. Patient had a large and low lying submandibular gland.   The inferior and anterior border of the gland were then dissected free from the underlying tissues.  This allowed the submandibular gland to be retracted posterior superiorly.  We were then able to find the tendon of the digastric muscle, which was directly underneath the submandibular gland.  The tendon and the muscle were dissected free and retracted inferiorly.  The posterior border of the anterior belly of the digastric was skeletonized.  We then were able to visualize the mylohyoid muscle.  Dissection was carried down into the digastric triangle where the hypoglossal muscle was identified.  A large vein was identified  next to the nerve and this was ligated.  The mylohyoid muscle was then retracted anteriorly.  The intraoperative microscope was then brought into the field.  The hypoglossal nerve was then dissected towards the floor of the mouth.  The individual branches of the 12th cranial nerve were then dissected under the microscope.  The anterior border of the hypoglossal muscle was identified.  The lateral branches of the retrusor muscles were identified and tested intraoperatively using the NIM monitor.  The lateral branches were excluded.  The electrode for the hypoglossal nerve stimulator was placed distally, incorporating C1 and the protrusor branches only.  We then secured using  the provided suture anchors.        A 5 cm incision was then made in the right upper chest.  Dissection was carried down to the pectoralis muscle.  The inferior pocket was created deep to the subcutaneous layer and superficial to the pectoralis muscle.  A third incision was made in the axillary line just adjacent to the nipple.  Dissection was carried down through the skin and subcutaneous tissue.  The serratus muscle was identified and incised.  The serratus muscles was very thick and deep.  Self-retaining retractors were used to expose the pocket.  There was a layer of fat between the serratus and external intercostal muscles.  The external intercostal muscles were then identified.  A tunnel was created between the external and internal intercostal muscles.  The pleural respiration sensing lead was placed into the pocket.  The sensor was then sutured to the fascia using the provided suture anchors.  The pleural respiration sensing lead was then tunneled into the subclavicular pocket.  The stimulation lead was also tunneled in the subplatysmal plane into the pocket.  We then connected the sensing as well as stimulation lead to the implantable pulse generator.  Diagnostic evaluation was run again which confirmed a good respiration signal.  We then observed motion of the tongue.  The patient had good bilateral tongue protrusion.  The IPG was secured loosely to the pectoralis fascia using silk sutures.  All the wounds were thoroughly irrigated with bacitracin irrigation.  The wound was then closed in layers using 3-0 Vicryl for the deep layer and 4-0 nylon for the skin layer.  Pressure dressings were placed.  The patient was awakened, extubated and transferred to the recovery room in stable condition.

## 2017-09-06 NOTE — ANESTHESIA POSTPROCEDURE EVALUATION
Patient: Juni aJin    Procedure(s):  Right Hypoglossal Nerve (Inspire) Implantation with Facial Nerve Monitoring - Wound Class: I-Clean    Diagnosis:Obstructive Sleep Apnea  Diagnosis Additional Information: No value filed.    Anesthesia Type:  General    Note:  Anesthesia Post Evaluation    Patient location during evaluation: bedside  Patient participation: Able to fully participate in evaluation  Level of consciousness: awake  Pain management: adequate  Airway patency: patent  Cardiovascular status: acceptable  Respiratory status: acceptable  Hydration status: acceptable  PONV: controlled     Anesthetic complications: None          Last vitals:  Vitals:    09/06/17 1247 09/06/17 1255 09/06/17 1315   BP: (!) 156/99 (!) 157/97 (!) 158/101   Pulse:   94   Resp: 14 16 16   Temp: 36.6  C (97.8  F) 36.3  C (97.4  F) 36.3  C (97.4  F)   SpO2: 94% 95% 96%         Electronically Signed By: Chris Muniz MD, MD  September 6, 2017  3:03 PM

## 2017-09-06 NOTE — ANESTHESIA CARE TRANSFER NOTE
Patient: Juni Jain    Procedure(s):  Right Hypoglossal Nerve (Inspire) Implantation with Facial Nerve Monitoring - Wound Class: I-Clean    Diagnosis: Obstructive Sleep Apnea  Diagnosis Additional Information: No value filed.    Anesthesia Type:   General     Note:  Airway :Nasal Cannula  Patient transferred to:PACU  Comments: Patient awake and breathing spont. VSS. No complaints of nausea. Report to Rn. Compalin of pain in throat. Given 0.5mg Dilaudid      Vitals: (Last set prior to Anesthesia Care Transfer)    CRNA VITALS  9/6/2017 1201 - 9/6/2017 1238      9/6/2017             EKG: NSR                Electronically Signed By: GORDO Murcia CRNA  September 6, 2017  12:38 PM

## 2017-09-07 DIAGNOSIS — R07.0 THROAT PAIN: Primary | ICD-10-CM

## 2017-09-07 RX ORDER — OXYCODONE AND ACETAMINOPHEN 5; 325 MG/1; MG/1
TABLET ORAL
Qty: 40 TABLET | Refills: 0 | Status: SHIPPED | OUTPATIENT
Start: 2017-09-07 | End: 2017-11-07

## 2017-09-12 ENCOUNTER — OFFICE VISIT (OUTPATIENT)
Dept: OTOLARYNGOLOGY | Facility: CLINIC | Age: 57
End: 2017-09-12

## 2017-09-12 DIAGNOSIS — G47.33 OSA (OBSTRUCTIVE SLEEP APNEA): Primary | ICD-10-CM

## 2017-09-12 ASSESSMENT — PAIN SCALES - GENERAL: PAINLEVEL: MILD PAIN (2)

## 2017-09-12 NOTE — PROGRESS NOTES
CHIEF COMPLAINT:   Status post right hypoglossal nerve stinumator implant on September 6, 2017.        HISTORY OF PRESENT ILLNESS:  The patient returns for his first postoperative visit.  Overall, he is doing better.  He initially had hoarseness after surgery and he said that resolved after two days.  He also has quite a bit of pain such that the Vicodin was not helpful and I switched him over to Percocet.  He reports the Percocet provided much better control and relief for his pain.  He has otherwise had no other issues that he is aware.      PHYSICAL EXAMINATION:   GENERAL:  No acute distress.   MOUTH:  He does have slight weakness of the right marginal mandibular nerve.  His cranial nerve XII is intact.     NECK:  Incision is clean, dry and intact.  He does have a moderate amount of fullness present and some erythema of the skin surrounding the suture, but not extending beyond it.  The fullness is slightly firm and does not feel ballottable.  Sutures were removed without difficulty.  No sign of infection.  Very low suspicion for seroma or hematoma.     CHEST:  The implant is stable.  Incision is clean, dry and intact.  There is a small amount of erythema associated around the incision, but not extending beyond where the sutures were.  The sensing incision also is clean, dry and intact with no evidence of infection.      ASSESSMENT AND PLAN:  The patient is healing well after surgery.  Hoarseness has improved.  I believe the hoarseness was likely due to a difficult intubation, but he has resolved without any issues.  We reviewed continued postoperative care.  The patient is flying on Thursday.  I think this is fine, but he should be careful with how much he is lifting with the right arm.  I will see him back in two months.      MT/ms

## 2017-09-12 NOTE — LETTER
9/12/2017       RE: Juni Jain  [address] [place] [address]     Dear Colleague,    Thank you for referring your patient, Juni Jain, to the [facility] at [facility]. Please see a copy of my visit note below.    CHIEF COMPLAINT:   Status post right hypoglossal nerve stinumator implant on September 6, 2017.        HISTORY OF PRESENT ILLNESS:  The patient returns for his first postoperative visit.  Overall, he is doing better.  He initially had hoarseness after surgery and he said that resolved after two days.  He also has quite a bit of pain such that the Vicodin was not helpful and I switched him over to Percocet.  He reports the Percocet provided much better control and relief for his pain.  He has otherwise had no other issues that he is aware.      PHYSICAL EXAMINATION:   GENERAL:  No acute distress.   MOUTH:  He does have slight weakness of the right marginal mandibular nerve.  His cranial nerve XII is intact.     NECK:  Incision is clean, dry and intact.  He does have a moderate amount of fullness present and some erythema of the skin surrounding the suture, but not extending beyond it.  The fullness is slightly firm and does not feel ballottable.  Sutures were removed without difficulty.  No sign of infection.  Very low suspicion for seroma or hematoma.     CHEST:  The implant is stable.  Incision is clean, dry and intact.  There is a small amount of erythema associated around the incision, but not extending beyond where the sutures were.  The sensing incision also is clean, dry and intact with no evidence of infection.      ASSESSMENT AND PLAN:  The patient is healing well after surgery.  Hoarseness has improved.  I believe the hoarseness was likely due to a difficult intubation, but he has resolved without any issues.  We reviewed continued postoperative care.  The patient is flying on Thursday.  I think this is fine, but  he should be careful with how much he is lifting with the right arm.  I will see him back in two months.      JH/ms         Again, thank you for allowing me to participate in the care of your patient.      Sincerely,    Liliana Paige MD

## 2017-09-12 NOTE — NURSING NOTE
Chief Complaint   Patient presents with     RECHECK     Return Post op Inspire implant 9/6/17 Pt states just a little throat pain.         MELY Montesinos LPN

## 2017-09-12 NOTE — MR AVS SNAPSHOT
After Visit Summary   9/12/2017    Juni Jain    MRN: [id number]           Patient Information     Date Of Birth          1960        Visit Information        Provider Department      9/12/2017 3:30 PM Liliana Paige MD [facility] Ear Nose and Throat        Today's Diagnoses     REY (obstructive sleep apnea)    -  1      Care Instructions    1. Please follow-up in clinic in 2 months  2. Please call the ENT clinic with any questions,concerns, new or worsening symptoms.    -Clinic number is [phone]                 Follow-ups after your visit        Follow-up notes from your care team     Return in about 2 months (around 11/12/2017).      Who to contact     Please call your clinic at [phone] to:    Ask questions about your health    Make or cancel appointments    Discuss your medicines    Learn about your test results    Speak to your doctor   If you have compliments or concerns about an experience at your clinic, or if you wish to file a complaint, please contact [facility] Patient Relations at [phone] or email us at [email or web address]         Additional Information About Your Visit        MyChart Information     [employer] gives you secure access to your electronic health record. If you see a primary care provider, you can also send messages to your care team and make appointments. If you have questions, please call your primary care clinic.  If you do not have a primary care provider, please call [phone] and they will assist you.      [employer] is an electronic gateway that provides easy, online access to your medical records. With [employer], you can request a clinic appointment, read your test results, renew a prescription or communicate with your care team.     To access your existing account, please contact your [facility] or call [phone] for assistance.        Care EveryWhere ID     This  is your Care EveryWhere ID. This could be used by other organizations to access your [place] medical records  [id number]         Blood Pressure from Last 3 Encounters:   09/06/17 (!) 158/101   08/31/17 120/74   07/21/17 140/82    Weight from Last 3 Encounters:   09/06/17 101.6 kg (224 lb)   08/31/17 102.5 kg (226 lb)   07/21/17 101.4 kg (223 lb 9.6 oz)              Today, you had the following     No orders found for display       Primary Care Provider Office Phone # Fax #    Ru Davis [phone]       [address]  [place] [address]        Equal Access to Services     [facility]: Hadii diana hare hadasho Soperlitaali, waaxda luqadaha, qaybta kaalmada adeegyada, waxay zahidain hayania santiago . So [facility] [phone].    ATENCIÓN: Si habla español, tiene a mitchell disposición servicios gratuitos de asistencia lingüística. [facility] [phone].    We comply with applicable federal civil rights laws and [place] laws. We do not discriminate on the basis of race, color, national origin, age, disability sex, sexual orientation or gender identity.            Thank you!     Thank you for choosing [facility] EAR NOSE AND THROAT  for your care. Our goal is always to provide you with excellent care. Hearing back from our patients is one way we can continue to improve our services. Please take a few minutes to complete the written survey that you may receive in the mail after your visit with us. Thank you!             Your Updated Medication List - Protect others around you: Learn how to safely use, store and throw away your medicines at www.disposemymeds.org.          This list is accurate as of: 9/12/17 11:59 PM.  Always use your most recent med list.                   Brand Name Dispense Instructions for use Diagnosis    ALPRAZolam 0.25 MG tablet    XANAX    15 tablet    as directed for anxiety prior to presentations and PRN anxiety    Panic disorder without agoraphobia, Social phobia,  Routine general medical examination at a health care facility       atorvastatin 40 MG tablet    LIPITOR    90 tablet    Take 1 tablet by mouth  daily    Hyperlipidemia LDL goal <130, Routine general medical examination at a health care facility       Bacitracin-Neomycin-Polymyxin 400-5-5000 Oint     28.35 g    apply a thin amount to each incision BID x 7 days    REY (obstructive sleep apnea)       cephALEXin 500 MG capsule    KEFLEX    21 capsule    Take 1 capsule (500 mg) by mouth 3 times daily for 7 days    REY (obstructive sleep apnea)       omega-3 fatty acids 1200 MG capsule     180 capsule    Take 2 capsules by mouth daily.        oxyCODONE-acetaminophen 5-325 MG per tablet    PERCOCET    40 tablet    1-2 tabs every 4 hours prn for pain    Throat pain       propranolol 10 MG tablet    INDERAL    15 tablet    TAKE 1-2 TABLETS BY MOUTH 90 MINUTES BEFORE PRESENTATION    Panic disorder without agoraphobia, Social phobia       venlafaxine 150 MG 24 hr capsule    EFFEXOR-XR    90 capsule    Take 1 capsule by mouth  daily    Panic disorder without agoraphobia, Routine general medical examination at a health care facility, Social phobia       VITAMIN D (CHOLECALCIFEROL) PO      Take 2-4 tablets by mouth daily.

## 2017-09-12 NOTE — PATIENT INSTRUCTIONS
1. Please follow-up in clinic in 2 months  2. Please call the ENT clinic with any questions,concerns, new or worsening symptoms.    -Clinic number is [phone]

## 2017-10-16 ENCOUNTER — ALLIED HEALTH/NURSE VISIT (OUTPATIENT)
Dept: NURSING | Facility: CLINIC | Age: 57
End: 2017-10-16
Payer: COMMERCIAL

## 2017-10-16 DIAGNOSIS — Z23 NEED FOR PROPHYLACTIC VACCINATION AND INOCULATION AGAINST INFLUENZA: Primary | ICD-10-CM

## 2017-10-16 PROCEDURE — 99207 ZZC NO CHARGE NURSE ONLY: CPT

## 2017-10-16 PROCEDURE — 90686 IIV4 VACC NO PRSV 0.5 ML IM: CPT

## 2017-10-16 PROCEDURE — 90471 IMMUNIZATION ADMIN: CPT

## 2017-10-16 NOTE — MR AVS SNAPSHOT
After Visit Summary   10/16/2017    Juni Jain    MRN: [id number]           Patient Information     Date Of Birth          1960        Visit Information        Provider Department      10/16/2017 2:30 PM EC MA/LPN [facility] [place]        Today's Diagnoses     Need for prophylactic vaccination and inoculation against influenza    -  1       Follow-ups after your visit        Your next 10 appointments already scheduled     Nov 07, 2017  1:45 PM CST   (Arrive by 1:30 PM)   Return Visit with Liliana Paige MD   [facility] Ear Nose [facility] ([facility])    [address] [facility] [address] [phone]              Who to contact     If you have questions or need follow up information about today's clinic visit or your schedule please contact [facility] [place] directly at [phone].  Normal or non-critical lab and imaging results will be communicated to you by MyChart, letter or phone within 4 business days after the clinic has received the results. If you do not hear from us within 7 days, please contact the clinic through [employer]hart or phone. If you have a critical or abnormal lab result, we will notify you by phone as soon as possible.  Submit refill requests through [employer] or call your pharmacy and they will forward the refill request to us. Please allow 3 business days for your refill to be completed.          Additional Information About Your Visit        MyChart Information     [employer] gives you secure access to your electronic health record. If you see a primary care provider, you can also send messages to your care team and make appointments. If you have questions, please call your primary care clinic.  If you do not have a primary care provider, please call [phone] and they will assist you.        Care EveryWhere ID     This is your Care EveryWhere ID. This could be used by other organizations  to access your [place] medical records  [id number]         Blood Pressure from Last 3 Encounters:   09/06/17 (!) 158/101   08/31/17 120/74   07/21/17 140/82    Weight from Last 3 Encounters:   09/06/17 224 lb (101.6 kg)   08/31/17 226 lb (102.5 kg)   07/21/17 223 lb 9.6 oz (101.4 kg)              We Performed the Following     FLU VAC, SPLIT VIRUS IM > 3 YO (QUADRIVALENT) [00334]     Vaccine Administration, Initial [23592]        Primary Care Provider Office Phone # Fax #    Ru Davis [phone]       [address]  [place] [address]        Equal Access to Services     [facility]: Hadii diana hare hadasho Soanibal, waaxda luqadaha, qaybta kaalmada adeegyada, te santiago . So [facility] [phone].    ATENCIÓN: Si habla español, tiene a mitchell disposición servicios gratuitos de asistencia lingüística. Ll[facility] [phone].    We comply with applicable federal civil rights laws and [place] laws. We do not discriminate on the basis of race, color, national origin, age, disability, sex, sexual orientation, or gender identity.            Thank you!     Thank you for choosing [facility]  for your care. Our goal is always to provide you with excellent care. Hearing back from our patients is one way we can continue to improve our services. Please take a few minutes to complete the written survey that you may receive in the mail after your visit with us. Thank you!             Your Updated Medication List - Protect others around you: Learn how to safely use, store and throw away your medicines at www.disposemymeds.org.          This list is accurate as of: 10/16/17  2:57 PM.  Always use your most recent med list.                   Brand Name Dispense Instructions for use Diagnosis    ALPRAZolam 0.25 MG tablet    XANAX    15 tablet    as directed for anxiety prior to presentations and PRN anxiety    Panic disorder without agoraphobia, Social phobia,  Routine general medical examination at a health care facility       atorvastatin 40 MG tablet    LIPITOR    90 tablet    Take 1 tablet by mouth  daily    Hyperlipidemia LDL goal <130, Routine general medical examination at a health care facility       Bacitracin-Neomycin-Polymyxin 400-5-5000 Oint     28.35 g    apply a thin amount to each incision BID x 7 days    REY (obstructive sleep apnea)       omega-3 fatty acids 1200 MG capsule     180 capsule    Take 2 capsules by mouth daily.        oxyCODONE-acetaminophen 5-325 MG per tablet    PERCOCET    40 tablet    1-2 tabs every 4 hours prn for pain    Throat pain       propranolol 10 MG tablet    INDERAL    15 tablet    TAKE 1-2 TABLETS BY MOUTH 90 MINUTES BEFORE PRESENTATION    Panic disorder without agoraphobia, Social phobia       venlafaxine 150 MG 24 hr capsule    EFFEXOR-XR    90 capsule    Take 1 capsule by mouth  daily    Panic disorder without agoraphobia, Routine general medical examination at a health care facility, Social phobia       VITAMIN D (CHOLECALCIFEROL) PO      Take 2-4 tablets by mouth daily.

## 2017-10-16 NOTE — PROGRESS NOTES
Injectable Influenza Immunization Documentation    1.  Is the person to be vaccinated sick today?   No    2. Does the person to be vaccinated have an allergy to a component   of the vaccine?   No    3. Has the person to be vaccinated ever had a serious reaction   to influenza vaccine in the past?   No    4. Has the person to be vaccinated ever had Guillain-Barré syndrome?   No    Form completed by Sonali Hdz MA

## 2017-10-25 ENCOUNTER — TRANSFERRED RECORDS (OUTPATIENT)
Dept: HEALTH INFORMATION MANAGEMENT | Facility: CLINIC | Age: 57
End: 2017-10-25

## 2017-11-07 ENCOUNTER — OFFICE VISIT (OUTPATIENT)
Dept: OTOLARYNGOLOGY | Facility: CLINIC | Age: 57
End: 2017-11-07

## 2017-11-07 VITALS — BODY MASS INDEX: 29.82 KG/M2 | WEIGHT: 225 LBS | HEIGHT: 73 IN

## 2017-11-07 DIAGNOSIS — G47.33 OSA (OBSTRUCTIVE SLEEP APNEA): Primary | ICD-10-CM

## 2017-11-07 RX ORDER — MUPIROCIN 20 MG/G
OINTMENT TOPICAL
COMMUNITY
Start: 2017-11-06 | End: 2020-07-13

## 2017-11-07 ASSESSMENT — PAIN SCALES - GENERAL: PAINLEVEL: NO PAIN (0)

## 2017-11-07 NOTE — NURSING NOTE
"Chief Complaint   Patient presents with     RECHECK     Post op follow up Inspire device      Height 1.854 m (6' 1\"), weight 102.1 kg (225 lb).    Darius Colon    "

## 2017-11-07 NOTE — PROGRESS NOTES
CHIEF COMPLAINT:  Status post right hypoglossal nerve stimulator implant on September 6, 2017.      HISTORY OF PRESENT ILLNESS:  The patient returns for his second postoperative visit.  He reports that he is doing very well.  He recently got activated about two weeks ago.  He reports that initially it was a different experience to feel his tongue base stimulated.  He reports it was not painful and for the most part, he does not notice the stimulation at night.  If he is able to fall asleep before the stimulator comes on, he tolerates it very well he reports.  He does have his titration study in another two to three weeks.         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  He has done mupirocin in the past but really only for a short time.  He recently saw another ENT who placed him back on mupirocin.      PHYSICAL EXAMINATION:   GENERAL:  No acute distress.     NECK:  Incision is clean, dry and intact.  He still has a moderate amount of fullness present but no sign of infection.     CHEST:  The implant is stable.  Incision is clean, dry and intact.  No infection.      ASSESSMENT AND PLAN:  The patient has healed extremely well after surgery.  He will continue to go through scar maturation.  He can try some silicone sheeting as well.  I will see him back in three months to see how he is doing after his titration study.  In terms of Bactroban ointment for the nose for staph infection, I would recommend having him do it every day twice a day for two months and then the first five days of each month for the next four months.  I discussed with him that I think this gives us the greatest chance of eradicating it.     I spent a total of 15 minutes face-to-face with Juni Jain during today's office visit.  Over 50% of this time was spent counseling the patient on and/or coordinating care as documented in my assessment and plan.

## 2017-11-07 NOTE — PATIENT INSTRUCTIONS
1.  You were seen in the ENT Clinic today by Dr. Paige.  If you have any questions or concerns after your appointment, please call [phone].  Press option #1 for scheduling related needs.  Press option #3 for Nurse advice.  2.  Plan is to return to clinic in 3 months for another assessment.

## 2017-11-07 NOTE — LETTER
11/7/2017     RE: Juni Jain  [address] [place] [address]     Dear Colleague,    Thank you for referring your patient, Juni Jain, to the [facility] E[facility] NOSE [facility] THROAT at [facility]. Please see a copy of my visit note below.    CHIEF COMPLAINT:  Status post right hypoglossal nerve stimulator implant on September 6, 2017.      HISTORY OF PRESENT ILLNESS:  The patient returns for his second postoperative visit.  He reports that he is doing very well.  He recently got activated about two weeks ago.  He reports that initially it was a different experience to feel his tongue base stimulated.  He reports it was not painful and for the most part, he does not notice the stimulation at night.  If he is able to fall asleep before the stimulator comes on, he tolerates it very well he reports.  He does have his titration study in another two to three weeks.         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  He has done mupirocin in the past but really only for a short time.  He recently saw another ENT who placed him back on mupirocin.      PHYSICAL EXAMINATION:   GENERAL:  No acute distress.     NECK:  Incision is clean, dry and intact.  He still has a moderate amount of fullness present but no sign of infection.     CHEST:  The implant is stable.  Incision is clean, dry and intact.  No infection.      ASSESSMENT AND PLAN:  The patient has healed extremely well after surgery.  He will continue to go through scar maturation.  He can try some silicone sheeting as well.  I will see him back in three months to see how he is doing after his titration study.  In terms of Bactroban ointment for the nose for staph infection, I would recommend having him do it every day twice a day for two months and then the first five days of each month for the next four months.  I discussed with him that I  think this gives us the greatest chance of eradicating it.     I spent a total of 15 minutes face-to-face with Juni Jain during today's office visit.  Over 50% of this time was spent counseling the patient on and/or coordinating care as documented in my assessment and plan.    Again, thank you for allowing me to participate in the care of your patient.      Sincerely,    Liliana Paige MD

## 2017-11-07 NOTE — MR AVS SNAPSHOT
After Visit Summary   11/7/2017    Juni Jain    MRN: [id number]           Patient Information     Date Of Birth          1960        Visit Information        Provider Department      11/7/2017 1:45 PM Liliana Paige MD [facility] Ear Nose and Throat        Today's Diagnoses     REY (obstructive sleep apnea)    -  1      Care Instructions    1.  You were seen in the ENT Clinic today by Dr. Paige.  If you have any questions or concerns after your appointment, please call [phone].  Press option #1 for scheduling related needs.  Press option #3 for Nurse advice.  2.  Plan is to return to clinic in 3 months for another assessment.              Follow-ups after your visit        Who to contact     Please call your clinic at [phone] to:    Ask questions about your health    Make or cancel appointments    Discuss your medicines    Learn about your test results    Speak to your doctor   If you have compliments or concerns about an experience at your clinic, or if you wish to file a complaint, please contact [facility] Patient Relations at [phone] or email us at [email or web address]         Additional Information About Your Visit        MyChart Information     [employer] gives you secure access to your electronic health record. If you see a primary care provider, you can also send messages to your care team and make appointments. If you have questions, please call your primary care clinic.  If you do not have a primary care provider, please call [phone] and they will assist you.      [employer] is an electronic gateway that provides easy, online access to your medical records. With [employer], you can request a clinic appointment, read your test results, renew a prescription or communicate with your care team.     To access your existing account, please contact your [facility] or call [phone] for  "assistance.        Care EveryWhere ID     This is your Care EveryWhere ID. This could be used by other organizations to access your [place] medical records  [id number]        Your Vitals Were     Height BMI (Body Mass Index)                1.854 m (6' 1\") 29.69 kg/m2           Blood Pressure from Last 3 Encounters:   09/06/17 (!) 158/101   08/31/17 120/74   07/21/17 140/82    Weight from Last 3 Encounters:   11/07/17 102.1 kg (225 lb)   09/06/17 101.6 kg (224 lb)   08/31/17 102.5 kg (226 lb)              Today, you had the following     No orders found for display       Primary Care Provider Office Phone # Fax #    Ru Davis [phone]       [address]  [place] [address]        Equal Access to Services     [facility]: Hadii diana hare hadasho Soanibal, waaxda luqadaha, qaybta kaalmada adelaytonyada, te santiago . So [facility] [phone].    ATENCIÓN: Si habla español, tiene a mitchell disposición servicios gratuitos de asistencia lingüística. Llame al [phone].    We comply with applicable federal civil rights laws and [place] laws. We do not discriminate on the basis of race, color, national origin, age, disability, sex, sexual orientation, or gender identity.            Thank you!     Thank you for choosing [facility] EAR NOSE AND THROAT  for your care. Our goal is always to provide you with excellent care. Hearing back from our patients is one way we can continue to improve our services. Please take a few minutes to complete the written survey that you may receive in the mail after your visit with us. Thank you!             Your Updated Medication List - Protect others around you: Learn how to safely use, store and throw away your medicines at www.disposemymeds.org.          This list is accurate as of: 11/7/17 11:59 PM.  Always use your most recent med list.                   Brand Name Dispense Instructions for use Diagnosis    ALPRAZolam 0.25 MG tablet "    XANAX    15 tablet    as directed for anxiety prior to presentations and PRN anxiety    Panic disorder without agoraphobia, Social phobia, Routine general medical examination at a health care facility       atorvastatin 40 MG tablet    LIPITOR    90 tablet    Take 1 tablet by mouth  daily    Hyperlipidemia LDL goal <130, Routine general medical examination at a health care facility       mupirocin 2 % ointment    BACTROBAN          omega-3 fatty acids 1200 MG capsule     180 capsule    Take 2 capsules by mouth daily.        propranolol 10 MG tablet    INDERAL    15 tablet    TAKE 1-2 TABLETS BY MOUTH 90 MINUTES BEFORE PRESENTATION    Panic disorder without agoraphobia, Social phobia       venlafaxine 150 MG 24 hr capsule    EFFEXOR-XR    90 capsule    Take 1 capsule by mouth  daily    Panic disorder without agoraphobia, Routine general medical examination at a health care facility, Social phobia       VITAMIN D (CHOLECALCIFEROL) PO      Take 2-4 tablets by mouth daily.

## 2017-12-21 ENCOUNTER — TRANSFERRED RECORDS (OUTPATIENT)
Dept: HEALTH INFORMATION MANAGEMENT | Facility: CLINIC | Age: 57
End: 2017-12-21

## 2017-12-27 ENCOUNTER — TRANSFERRED RECORDS (OUTPATIENT)
Dept: HEALTH INFORMATION MANAGEMENT | Facility: CLINIC | Age: 57
End: 2017-12-27

## 2018-01-03 ENCOUNTER — TRANSFERRED RECORDS (OUTPATIENT)
Dept: HEALTH INFORMATION MANAGEMENT | Facility: CLINIC | Age: 58
End: 2018-01-03

## 2018-01-19 ENCOUNTER — OFFICE VISIT (OUTPATIENT)
Dept: FAMILY MEDICINE | Facility: CLINIC | Age: 58
End: 2018-01-19
Payer: COMMERCIAL

## 2018-01-19 DIAGNOSIS — F41.1 GAD (GENERALIZED ANXIETY DISORDER): ICD-10-CM

## 2018-01-19 DIAGNOSIS — S61.349A: Primary | ICD-10-CM

## 2018-01-19 PROCEDURE — 11730 AVULSION NAIL PLATE SIMPLE 1: CPT | Performed by: FAMILY MEDICINE

## 2018-01-19 NOTE — MR AVS SNAPSHOT
After Visit Summary   1/19/2018    Juni Jain    MRN: [id number]           Patient Information     Date Of Birth          1960        Visit Information        Provider Department      1/19/2018 12:00 PM Ashok Mandel MD [facility] [place]        Today's Diagnoses     Puncture wound of finger with foreign body with damage to nail, initial encounter    -  1    ELVIS (generalized anxiety disorder)           Follow-ups after your visit        Who to contact     If you have questions or need follow up information about today's clinic visit or your schedule please contact [facility] [place] directly at [phone].  Normal or non-critical lab and imaging results will be communicated to you by ToonTimehart, letter or phone within 4 business days after the clinic has received the results. If you do not hear from us within 7 days, please contact the clinic through ToonTimehart or phone. If you have a critical or abnormal lab result, we will notify you by phone as soon as possible.  Submit refill requests through [facility] or call your pharmacy and they will forward the refill request to us. Please allow 3 business days for your refill to be completed.          Additional Information About Your Visit        MyChart Information     [facility] gives you secure access to your electronic health record. If you see a primary care provider, you can also send messages to your care team and make appointments. If you have questions, please call your primary care clinic.  If you do not have a primary care provider, please call [phone] and they will assist you.        Care EveryWhere ID     This is your Care EveryWhere ID. This could be used by other organizations to access your [facility] medical records  [id number]         Blood Pressure from Last 3 Encounters:   09/06/17 (!) 158/101   08/31/17 120/74   07/21/17 140/82    Weight from Last 3 Encounters:   11/07/17 225 lb (102.1 kg)   09/06/17 224 lb  (101.6 kg)   08/31/17 226 lb (102.5 kg)              We Performed the Following     DEBRIDEMENT WOUND UP TO 20 SQ CM     DEPRESSION ACTION PLAN (DAP)     REMOVAL OF NAIL PLATE SIMPLE SINGLE        Primary Care Provider Office Phone # Fax #    Ru Davis [phone]       [address]  [place] [address]        Equal Access to Services     [facility]: Hadii aad ku hadasho Soomaali, waaxda luqadaha, qaybta kaalmada adeegyada, waxay idiin hayaan adeeg kharash la'aan ah. So [place] [phone].    ATENCIÓN: Si habla español, tiene a mitchell disposición servicios gratuitos de asistencia lingüística. Llame al [phone].    We comply with applicable federal civil rights laws and [place] laws. We do not discriminate on the basis of race, color, national origin, age, disability, sex, sexual orientation, or gender identity.            Thank you!     [facility]. Our goal is always to provide you with excellent care. Hearing back from our patients is one way we can continue to improve our services. Please take a few minutes to complete the written survey that you may receive in the mail after your visit with us. Thank you!             Your Updated Medication List - Protect others around you: Learn how to safely use, store and throw away your medicines at www.disposemymeds.org.          This list is accurate as of: 1/19/18 12:42 PM.  Always use your most recent med list.                   Brand Name Dispense Instructions for use Diagnosis    ALPRAZolam 0.25 MG tablet    XANAX    15 tablet    as directed for anxiety prior to presentations and PRN anxiety    Panic disorder without agoraphobia, Social phobia, Routine general medical examination at a health care facility       atorvastatin 40 MG tablet    LIPITOR    90 tablet    Take 1 tablet by mouth  daily    Hyperlipidemia LDL goal <130, Routine general medical examination at a health care  facility       mupirocin 2 % ointment    BACTROBAN          omega-3 fatty acids 1200 MG capsule     180 capsule    Take 2 capsules by mouth daily.        propranolol 10 MG tablet    INDERAL    15 tablet    TAKE 1-2 TABLETS BY MOUTH 90 MINUTES BEFORE PRESENTATION    Panic disorder without agoraphobia, Social phobia       venlafaxine 150 MG 24 hr capsule    EFFEXOR-XR    30 capsule    TAKE 1 CAPSULE BY MOUTH  DAILY    Panic disorder without agoraphobia, Routine general medical examination at a health care facility, Social phobia       VITAMIN D (CHOLECALCIFEROL) PO      Take 2-4 tablets by mouth daily.

## 2018-01-19 NOTE — PROGRESS NOTES
SUBJECTIVE:   Juni Jain is a 57 year old male who presents to clinic today for the following health issues:      Hand Injury       Duration: 1 day     Description (location/character/radiation): foreign object emended under right thumb nail    Intensity:  moderate    Accompanying signs and symptoms: none    History (similar episodes/previous evaluation): None    Precipitating or alleviating factors: None    Therapies tried and outcome: trying to remove it with nail clipper        Problem list and histories reviewed & adjusted, as indicated.  Additional history: as documented    Patient Active Problem List   Diagnosis     Panic disorder without agoraphobia     Depressive disorder, not elsewhere classified     Esophageal reflux     HYPERLIPIDEMIA LDL GOAL <130     Chronic folliculitis     ELVIS (generalized anxiety disorder)     REY (obstructive sleep apnea)     Past Surgical History:   Procedure Laterality Date     COLONOSCOPY  2013    Going June 9, 2016     COSMETIC SURGERY  1981    Rhinoplasty/chin implant-Deviated Septum     ENDOSCOPY DRUG INDUCED SLEEP Bilateral 7/19/2017    Procedure: ENDOSCOPY DRUG INDUCED SLEEP;  Drug Induced Sleep Endoscopy;  Surgeon: Liliana Paige MD;  Location: [facility] OR     ENT SURGERY  over the years    CPAP never worked for me. Uncomfortable.     IMPLANT GENERATOR STIMULATOR (LOCATION) Right 9/6/2017    Procedure: IMPLANT GENERATOR STIMULATOR (LOCATION);  Right Hypoglossal Nerve (Inspire) Implantation with Facial Nerve Monitoring;  Surgeon: Liliana Paige MD;  Location:  OR       Social History   Substance Use Topics     Smoking status: Never Smoker     Smokeless tobacco: Never Used      Comment: Do not smoke     Alcohol use 0.0 oz/week      Comment: Weekends     Family History   Problem Relation Age of Onset     Alzheimer Disease Mother      Dementia Mother      Dementia     C.A.D. Father 86     Bypass 5/2011     CANCER Father      Esophageal cancer      Thyroid Disease Brother      Thyroid Disease Brother      Hypo. Under     CANCER Maternal Grandmother      Stomach Cancer     CANCER Maternal Grandfather      Lung Cancer     Thyroid Disease Brother      ,         Current Outpatient Prescriptions   Medication Sig Dispense Refill     venlafaxine (EFFEXOR-XR) 150 MG 24 hr capsule TAKE 1 CAPSULE BY MOUTH  DAILY 30 capsule 0     atorvastatin (LIPITOR) 40 MG tablet Take 1 tablet by mouth  daily 90 tablet 2     VITAMIN D, CHOLECALCIFEROL, PO Take 2-4 tablets by mouth daily.       Omega-3 Fatty Acids (FISH OIL) 1200 MG capsule Take 2 capsules by mouth daily. 180 capsule 12     mupirocin (BACTROBAN) 2 % ointment        ALPRAZolam (XANAX) 0.25 MG tablet as directed for anxiety prior to presentations and PRN anxiety (Patient not taking: Reported on 9/12/2017) 15 tablet 1     propranolol (INDERAL) 10 MG tablet TAKE 1-2 TABLETS BY MOUTH 90 MINUTES BEFORE PRESENTATION (Patient not taking: Reported on 9/12/2017) 15 tablet 3     Allergies   Allergen Reactions     No Known Drug Allergies      Recent Labs   Lab Test  07/21/17   1308  07/07/17   0903  05/13/16   0759  05/22/15   0814   08/29/11   1055   A1C  5.4   --    --    --    --    --    LDL  133*   --   137*  102   < >  124   HDL  41   --   33*  37*   < >  38*   TRIG  174*   --   146  94   < >  137   ALT   --   95*  58  55   < >  55   CR   --   1.17  1.11  1.27*   < >  1.25   GFRESTIMATED   --   65  69  59*   < >  62   GFRESTBLACK   --   78  84  72   < >  75   POTASSIUM   --   4.3  4.1  4.7   < >  4.5   TSH   --    --    --    --    --   2.40    < > = values in this interval not displayed.      BP Readings from Last 3 Encounters:   09/06/17 (!) 158/101   08/31/17 120/74   07/21/17 140/82    Wt Readings from Last 3 Encounters:   11/07/17 225 lb (102.1 kg)   09/06/17 224 lb (101.6 kg)   08/31/17 226 lb (102.5 kg)        Reviewed and updated as needed this visit by clinical staff     Reviewed and updated as needed this visit by  Provider         ROS:  Constitutional, HEENT, cardiovascular, pulmonary, gi and gu systems are negative, except as otherwise noted.      OBJECTIVE:   There were no vitals taken for this visit.  There is no height or weight on file to calculate BMI.  GENERAL: healthy, alert and no distress  NECK: no adenopathy, no asymmetry, masses, or scars and thyroid normal to palpation  RESP: lungs clear to auscultation - no rales, rhonchi or wheezes  CV: regular rate and rhythm, normal S1 S2, no S3 or S4, no murmur, click or rub, no peripheral edema and peripheral pulses strong  ABDOMEN: soft, nontender, no hepatosplenomegaly, no masses and bowel sounds normal  MS: no gross musculoskeletal defects noted, no edema        ASSESSMENT/PLAN:   Juni was seen today for hand injury.    Diagnoses and all orders for this visit:    Puncture wound of finger with foreign body with damage to nail, initial encounter  -     DEBRIDEMENT WOUND UP TO 20 SQ CM  -     REMOVAL OF NAIL PLATE SIMPLE SINGLE    ELVIS (generalized anxiety disorder)  -     DEPRESSION ACTION PLAN (DAP)      Has right thumb nail injury with foreign body embedded under the nail,   After verbal consents obtained, Using a 50-50 mixture of 1% plain lidocaine and 0.5% plain marcaine, a ring block was done (3 cc total). Using a sterile instruments, I freed the nail from the nail bed and removed a wedge of the nail including the the foreign body embedded portion to the level of the nail skin fold. Dry starch past like material removed with micro-currete without complication.  This was well tolerated, minimal bleeding. Antibiotic ointment and a dressing are applied. Remove the dressing tomorrow and begin frequent soaks, complete his antibiotics and have a follow up visit in a week. Call if pain, erythema fever or bleeding. Wound care and dressing instructions are given.    FUTURE APPOINTMENTS:       - Follow-up visit as needed     Ashok Mandel MD  [facility] [place]

## 2018-01-19 NOTE — NURSING NOTE
"Chief Complaint   Patient presents with     Hand Injury       Initial There were no vitals taken for this visit. Estimated body mass index is 29.69 kg/(m^2) as calculated from the following:    Height as of 11/7/17: 6' 1\" (1.854 m).    Weight as of 11/7/17: 225 lb (102.1 kg).  Medication Reconciliation: complete   Catarina Jeffery, ANJEL    "

## 2018-03-12 DIAGNOSIS — F41.0 PANIC DISORDER WITHOUT AGORAPHOBIA: ICD-10-CM

## 2018-03-12 DIAGNOSIS — F40.10 SOCIAL PHOBIA: ICD-10-CM

## 2018-03-12 DIAGNOSIS — Z00.00 ROUTINE GENERAL MEDICAL EXAMINATION AT A HEALTH CARE FACILITY: ICD-10-CM

## 2018-03-12 NOTE — TELEPHONE ENCOUNTER
Patient requesting updates re: medication refill. Please advise  Cell: [phone]  Thank you  Rianna Le

## 2018-03-12 NOTE — TELEPHONE ENCOUNTER
"Requested Prescriptions   Pending Prescriptions Disp Refills     venlafaxine (EFFEXOR-XR) 150 MG 24 hr capsule  Last Written Prescription Date:  1-3-2018  Last Fill Quantity: 30 capsule,  # refills: 0   Last office visit: 1/19/2018 with prescribing provider:  1-   Future Office Visit:     30 capsule 0    Serotonin-Norepinephrine Reuptake Inhibitors  Failed    3/12/2018 10:48 AM       Failed - Blood pressure under 140/90 in past 12 months    BP Readings from Last 3 Encounters:   09/06/17 (!) 158/101   08/31/17 120/74   07/21/17 140/82                Passed - Recent (12 mo) or future (30 days) visit within the authorizing provider's specialty    Patient had office visit in the last 12 months or has a visit in the next 30 days with authorizing provider or within the authorizing provider's specialty.  See \"Patient Info\" tab in inbasket, or \"Choose Columns\" in Meds & Orders section of the refill encounter.           Passed - Patient is age 18 or older       Passed - Normal serum creatinine on file in past 12 months    Recent Labs   Lab Test  07/07/17   0903   CR  1.17               "

## 2018-03-13 NOTE — TELEPHONE ENCOUNTER
Reason for Call:  Other prescription    Detailed comments: Patient called again today about this prescription. He was notified of the 48-72 hour window on all refills. He says he would like to speak to a nurse about this.    Phone Number Patient can be reached at: Cell number on file:    Telephone Information:   Mobile [phone]     Best Time: Anytime    Can we leave a detailed message on this number? NO    Call taken on 3/13/2018 at 12:16 PM by Devika San

## 2018-03-14 RX ORDER — VENLAFAXINE HYDROCHLORIDE 150 MG/1
150 CAPSULE, EXTENDED RELEASE ORAL DAILY
Qty: 15 CAPSULE | Refills: 0 | Status: SHIPPED | OUTPATIENT
Start: 2018-03-14 | End: 2018-03-29

## 2018-03-14 NOTE — TELEPHONE ENCOUNTER
Routing refill request to provider for review/approval because:  Labs out of range:  Last BP on file was 158/101    Vane Garland, RN  [employer]

## 2018-03-14 NOTE — TELEPHONE ENCOUNTER
Patient calling again. Requesting to speak to a nurse today. He wants updates and understands that it takes 48-72 hours to respond, but he is at 48 hours and is calling everyday.   Thank you  Rianna Del Castillo

## 2018-03-23 ENCOUNTER — TRANSFERRED RECORDS (OUTPATIENT)
Dept: HEALTH INFORMATION MANAGEMENT | Facility: CLINIC | Age: 58
End: 2018-03-23

## 2018-03-26 DIAGNOSIS — Z00.00 ROUTINE GENERAL MEDICAL EXAMINATION AT A HEALTH CARE FACILITY: ICD-10-CM

## 2018-03-26 DIAGNOSIS — F40.10 SOCIAL PHOBIA: ICD-10-CM

## 2018-03-26 DIAGNOSIS — F41.0 PANIC DISORDER WITHOUT AGORAPHOBIA: ICD-10-CM

## 2018-03-26 RX ORDER — VENLAFAXINE HYDROCHLORIDE 150 MG/1
150 CAPSULE, EXTENDED RELEASE ORAL DAILY
Qty: 15 CAPSULE | Refills: 0 | Status: CANCELLED | OUTPATIENT
Start: 2018-03-26

## 2018-03-26 NOTE — TELEPHONE ENCOUNTER
Reason for Call:  Other prescription    Detailed comments: Pt called this afternoon and would like a refill on his venlofaxine. Please give pt a call if he needs to set up and appt. With Dr. Davis or if he can just get a refill. Thank you.    Phone Number Patient can be reached at: Home number on file [phone] (home)    Best Time:     Can we leave a detailed message on this number? YES    Call taken on 3/26/2018 at 3:47 PM by Zoey Franz

## 2018-03-26 NOTE — TELEPHONE ENCOUNTER
Per last refill, patient will need appt for further refill.    Left detail message advising to call clinic for appt.   Consent to LM below    Mary Navarrete RN

## 2018-03-28 ENCOUNTER — TRANSFERRED RECORDS (OUTPATIENT)
Dept: HEALTH INFORMATION MANAGEMENT | Facility: CLINIC | Age: 58
End: 2018-03-28

## 2018-03-29 ENCOUNTER — OFFICE VISIT (OUTPATIENT)
Dept: FAMILY MEDICINE | Facility: CLINIC | Age: 58
End: 2018-03-29
Payer: COMMERCIAL

## 2018-03-29 VITALS
WEIGHT: 225 LBS | OXYGEN SATURATION: 96 % | DIASTOLIC BLOOD PRESSURE: 78 MMHG | HEART RATE: 97 BPM | TEMPERATURE: 97.3 F | RESPIRATION RATE: 14 BRPM | BODY MASS INDEX: 29.82 KG/M2 | SYSTOLIC BLOOD PRESSURE: 121 MMHG | HEIGHT: 73 IN

## 2018-03-29 DIAGNOSIS — F40.10 SOCIAL PHOBIA: ICD-10-CM

## 2018-03-29 DIAGNOSIS — F41.1 GAD (GENERALIZED ANXIETY DISORDER): Primary | ICD-10-CM

## 2018-03-29 PROCEDURE — 99213 OFFICE O/P EST LOW 20 MIN: CPT | Performed by: FAMILY MEDICINE

## 2018-03-29 RX ORDER — PROPRANOLOL HYDROCHLORIDE 10 MG/1
TABLET ORAL
Qty: 15 TABLET | Refills: 3 | Status: SHIPPED | OUTPATIENT
Start: 2018-03-29 | End: 2018-09-06

## 2018-03-29 RX ORDER — VENLAFAXINE HYDROCHLORIDE 150 MG/1
150 CAPSULE, EXTENDED RELEASE ORAL DAILY
Qty: 30 CAPSULE | Refills: 2 | Status: SHIPPED | OUTPATIENT
Start: 2018-03-29 | End: 2018-09-06

## 2018-03-29 ASSESSMENT — ANXIETY QUESTIONNAIRES
5. BEING SO RESTLESS THAT IT IS HARD TO SIT STILL: NOT AT ALL
2. NOT BEING ABLE TO STOP OR CONTROL WORRYING: SEVERAL DAYS
6. BECOMING EASILY ANNOYED OR IRRITABLE: SEVERAL DAYS
3. WORRYING TOO MUCH ABOUT DIFFERENT THINGS: NOT AT ALL
GAD7 TOTAL SCORE: 4
7. FEELING AFRAID AS IF SOMETHING AWFUL MIGHT HAPPEN: NOT AT ALL
IF YOU CHECKED OFF ANY PROBLEMS ON THIS QUESTIONNAIRE, HOW DIFFICULT HAVE THESE PROBLEMS MADE IT FOR YOU TO DO YOUR WORK, TAKE CARE OF THINGS AT HOME, OR GET ALONG WITH OTHER PEOPLE: NOT DIFFICULT AT ALL
1. FEELING NERVOUS, ANXIOUS, OR ON EDGE: SEVERAL DAYS

## 2018-03-29 ASSESSMENT — PATIENT HEALTH QUESTIONNAIRE - PHQ9: 5. POOR APPETITE OR OVEREATING: SEVERAL DAYS

## 2018-03-29 NOTE — NURSING NOTE
"Chief Complaint   Patient presents with     Anxiety       Initial /78 (Cuff Size: Adult Large)  Pulse 97  Temp 97.3  F (36.3  C)  Resp 14  Ht 6' 1\" (1.854 m)  Wt 225 lb (102.1 kg)  SpO2 96%  BMI 29.69 kg/m2 Estimated body mass index is 29.69 kg/(m^2) as calculated from the following:    Height as of this encounter: 6' 1\" (1.854 m).    Weight as of this encounter: 225 lb (102.1 kg).  Medication Reconciliation: complete   Catarina Jeffery, CMA    "

## 2018-03-29 NOTE — PROGRESS NOTES
SUBJECTIVE:   Juni Jain is a 57 year old male who presents to clinic today for the following health issues:      Anxiety Follow-Up    Status since last visit: No change    Other associated symptoms:None    Complicating factors:   Significant life event: No   Current substance abuse: None  Depression symptoms: No  ELVIS-7 SCORE 5/13/2016 7/7/2017 7/21/2017   Total Score - - -   Total Score 9 12 7       ELVIS-7    Amount of exercise or physical activity: None    Problems taking medications regularly: No    Medication side effects: none    Diet: regular (no restrictions)            Problem list and histories reviewed & adjusted, as indicated.      Patient Active Problem List   Diagnosis     Panic disorder without agoraphobia     Depressive disorder, not elsewhere classified     Esophageal reflux     HYPERLIPIDEMIA LDL GOAL <130     Chronic folliculitis     ELVIS (generalized anxiety disorder)     REY (obstructive sleep apnea)     Past Surgical History:   Procedure Laterality Date     COLONOSCOPY  2013    Going June 9, 2016     COSMETIC SURGERY  1981    Rhinoplasty/chin implant-Deviated Septum     ENDOSCOPY DRUG INDUCED SLEEP Bilateral 7/19/2017    Procedure: ENDOSCOPY DRUG INDUCED SLEEP;  Drug Induced Sleep Endoscopy;  Surgeon: Liliana Paige MD;  Location:  OR     ENT SURGERY  over the years    CPAP never worked for me. Uncomfortable.     IMPLANT GENERATOR STIMULATOR (LOCATION) Right 9/6/2017    Procedure: IMPLANT GENERATOR STIMULATOR (LOCATION);  Right Hypoglossal Nerve (Inspire) Implantation with Facial Nerve Monitoring;  Surgeon: Liliana Paige MD;  Location:  OR       Social History   Substance Use Topics     Smoking status: Never Smoker     Smokeless tobacco: Never Used      Comment: Do not smoke     Alcohol use 0.0 oz/week      Comment: Weekends     Family History   Problem Relation Age of Onset     Alzheimer Disease Mother      Dementia Mother      Dementia     C.A.D. Father 86      "Bypass 5/2011     CANCER Father      Esophageal cancer     Thyroid Disease Brother      Thyroid Disease Brother      Hypo. Under     CANCER Maternal Grandmother      Stomach Cancer     CANCER Maternal Grandfather      Lung Cancer     Thyroid Disease Brother      ,         Current Outpatient Prescriptions   Medication Sig Dispense Refill     venlafaxine (EFFEXOR-XR) 150 MG 24 hr capsule Take 1 capsule (150 mg) by mouth daily 30 capsule 2     propranolol (INDERAL) 10 MG tablet TAKE 1-2 TABLETS BY MOUTH 90 MINUTES BEFORE PRESENTATION 15 tablet 3     mupirocin (BACTROBAN) 2 % ointment        atorvastatin (LIPITOR) 40 MG tablet Take 1 tablet by mouth  daily 90 tablet 2     VITAMIN D, CHOLECALCIFEROL, PO Take 2-4 tablets by mouth daily.       Omega-3 Fatty Acids (FISH OIL) 1200 MG capsule Take 2 capsules by mouth daily. 180 capsule 12     [DISCONTINUED] venlafaxine (EFFEXOR-XR) 150 MG 24 hr capsule Take 1 capsule (150 mg) by mouth daily 15 capsule 0     ALPRAZolam (XANAX) 0.25 MG tablet as directed for anxiety prior to presentations and PRN anxiety (Patient not taking: Reported on 9/12/2017) 15 tablet 1     [DISCONTINUED] propranolol (INDERAL) 10 MG tablet TAKE 1-2 TABLETS BY MOUTH 90 MINUTES BEFORE PRESENTATION 15 tablet 3       Reviewed and updated as needed this visit by clinical staff       Reviewed and updated as needed this visit by Provider         ROS:  CONSTITUTIONAL: NEGATIVE for fever, chills, change in weight  ENT/MOUTH: NEGATIVE for ear, mouth and throat problems  RESP: NEGATIVE for significant cough or SOB  CV: NEGATIVE for chest pain, palpitations or peripheral edema    OBJECTIVE:                                                    /78 (Cuff Size: Adult Large)  Pulse 97  Temp 97.3  F (36.3  C)  Resp 14  Ht 6' 1\" (1.854 m)  Wt 225 lb (102.1 kg)  SpO2 96%  BMI 29.69 kg/m2  Body mass index is 29.69 kg/(m^2).   GENERAL: healthy, alert, well nourished, well hydrated, no distress  NECK: no tenderness, " no adenopathy, no asymmetry, no masses, no stiffness; thyroid- normal to palpation  RESP: lungs clear to auscultation - no rales, no rhonchi, no wheezes  CV: regular rates and rhythm, normal S1 S2, no S3 or S4 and no murmur, no click or rub -  PSYCH: Alert and oriented times 3; speech- coherent , normal rate and volume; able to articulate logical thoughts, able to abstract reason, no tangential thoughts, no hallucinations or delusions, affect- normal       ASSESSMENT/PLAN:                                                        ICD-10-CM    1. ELVIS (generalized anxiety disorder) F41.1    2. Social phobia F40.10 venlafaxine (EFFEXOR-XR) 150 MG 24 hr capsule     propranolol (INDERAL) 10 MG tablet     Anxiety issues are well controlled . He is exhausted advised to follow-up in 3-4 months for physical.  .Medication refill for the patient.    He want us to send this medication to the local pharmacy but he would like future refills from mail order.  He will let us know if he wants that transition.    Ru Davis MD  [facility] [place]

## 2018-03-29 NOTE — MR AVS SNAPSHOT
"              After Visit Summary   3/29/2018    Juni Jain    MRN: [id number]           Patient Information     Date Of Birth          1960        Visit Information        Provider Department      3/29/2018 4:20 PM Ru Davis MD [facility] [place]        Today's Diagnoses     ELVIS (generalized anxiety disorder)    -  1    Social phobia           Follow-ups after your visit        Follow-up notes from your care team     Return in about 3 months (around 6/29/2018) for Physical Exam.      Who to contact     If you have questions or need follow up information about today's clinic visit or your schedule please contact [facility] [place] directly at [phone].  Normal or non-critical lab and imaging results will be communicated to you by MyChart, letter or phone within 4 business days after the clinic has received the results. If you do not hear from us within 7 days, please contact the clinic through [employer] or phone. If you have a critical or abnormal lab result, we will notify you by phone as soon as possible.  Submit refill requests through [employer] or call your pharmacy and they will forward the refill request to us. Please allow 3 business days for your refill to be completed.          Additional Information About Your Visit        MyChart Information     [employer] gives you secure access to your electronic health record. If you see a primary care provider, you can also send messages to your care team and make appointments. If you have questions, please call your primary care clinic.  If you do not have a primary care provider, please call [phone] and they will assist you.        Care EveryWhere ID     This is your Care EveryWhere ID. This could be used by other organizations to access your [place] medical records  [id number]        Your Vitals Were     Pulse Temperature Respirations Height Pulse Oximetry BMI (Body Mass Index)    97 97.3  F (36.3  C) 14 6' 1\" (1.854 m) " 96% 29.69 kg/m2       Blood Pressure from Last 3 Encounters:   03/29/18 121/78   09/06/17 (!) 158/101   08/31/17 120/74    Weight from Last 3 Encounters:   03/29/18 225 lb (102.1 kg)   11/07/17 225 lb (102.1 kg)   09/06/17 224 lb (101.6 kg)              Today, you had the following     No orders found for display         Today's Medication Changes          These changes are accurate as of 3/29/18  4:34 PM.  If you have any questions, ask your nurse or doctor.               These medicines have changed or have updated prescriptions.        Dose/Directions    propranolol 10 MG tablet   Commonly known as:  INDERAL   This may have changed:  See the new instructions.   Used for:  Social phobia   Changed by:  Ru aDvis MD        TAKE 1-2 TABLETS BY MOUTH 90 MINUTES BEFORE PRESENTATION   Quantity:  15 tablet   Refills:  3            Where to get your medicines      These medications were sent to [place] [address] [facility] [place] - [address]  [address] [address]     Phone:  [phone]     propranolol 10 MG tablet    venlafaxine 150 MG 24 hr capsule                Primary Care Provider Office Phone # Fax #    Ru Davis [phone]       [address]  [place] [address]        Equal Access to Services     [facility]: Hadii aad ku hadasho Soomaali, waaxda luqadaha, qaybta kaalmada adeegyada, te satniago ah. So [facility] [phone].    ATENCIÓN: Si habla español, tiene a mitchell disposición servicios gratuitos de asistencia lingüística. Llame al [phone].    We comply with applicable federal civil rights laws and [place] laws. We do not discriminate on the basis of race, color, national origin, age, disability, sex, sexual orientation, or gender identity.            Thank you!     Thank you for choosing [facility] [place]  for your care. Our goal is always to provide you with excellent care. Hearing back from  our patients is one way we can continue to improve our services. Please take a few minutes to complete the written survey that you may receive in the mail after your visit with us. Thank you!             Your Updated Medication List - Protect others around you: Learn how to safely use, store and throw away your medicines at www.disposemymeds.org.          This list is accurate as of 3/29/18  4:34 PM.  Always use your most recent med list.                   Brand Name Dispense Instructions for use Diagnosis    ALPRAZolam 0.25 MG tablet    XANAX    15 tablet    as directed for anxiety prior to presentations and PRN anxiety    Panic disorder without agoraphobia, Social phobia, Routine general medical examination at a health care facility       atorvastatin 40 MG tablet    LIPITOR    90 tablet    Take 1 tablet by mouth  daily    Hyperlipidemia LDL goal <130, Routine general medical examination at a health care facility       mupirocin 2 % ointment    BACTROBAN          omega-3 fatty acids 1200 MG capsule     180 capsule    Take 2 capsules by mouth daily.        propranolol 10 MG tablet    INDERAL    15 tablet    TAKE 1-2 TABLETS BY MOUTH 90 MINUTES BEFORE PRESENTATION    Social phobia       venlafaxine 150 MG 24 hr capsule    EFFEXOR-XR    30 capsule    Take 1 capsule (150 mg) by mouth daily    Social phobia       VITAMIN D (CHOLECALCIFEROL) PO      Take 2-4 tablets by mouth daily.

## 2018-03-30 ASSESSMENT — ANXIETY QUESTIONNAIRES: GAD7 TOTAL SCORE: 4

## 2018-03-30 ASSESSMENT — PATIENT HEALTH QUESTIONNAIRE - PHQ9: SUM OF ALL RESPONSES TO PHQ QUESTIONS 1-9: 5

## 2018-04-13 ENCOUNTER — TRANSFERRED RECORDS (OUTPATIENT)
Dept: HEALTH INFORMATION MANAGEMENT | Facility: CLINIC | Age: 58
End: 2018-04-13

## 2018-04-16 ENCOUNTER — TRANSFERRED RECORDS (OUTPATIENT)
Dept: HEALTH INFORMATION MANAGEMENT | Facility: CLINIC | Age: 58
End: 2018-04-16

## 2018-04-17 DIAGNOSIS — F40.10 SOCIAL PHOBIA: ICD-10-CM

## 2018-04-17 DIAGNOSIS — F41.0 PANIC DISORDER WITHOUT AGORAPHOBIA: ICD-10-CM

## 2018-04-17 DIAGNOSIS — Z00.00 ROUTINE GENERAL MEDICAL EXAMINATION AT A HEALTH CARE FACILITY: ICD-10-CM

## 2018-04-17 NOTE — TELEPHONE ENCOUNTER
"Requested Prescriptions   Pending Prescriptions Disp Refills     venlafaxine (EFFEXOR-XR) 150 MG 24 hr capsule [Pharmacy Med Name: VENLAFAXINE  150MG  CAP  XR]  Last Written Prescription Date:  3/29/18  Last Fill Quantity: 30,  # refills: 2   Last office visit: 3/29/2018 with prescribing provider:  Susan   Future Office Visit:     15 capsule      Sig: TAKE 1 CAPSULE BY MOUTH  DAILY    Serotonin-Norepinephrine Reuptake Inhibitors  Passed    4/17/2018 11:36 AM       Passed - Blood pressure under 140/90 in past 12 months    BP Readings from Last 3 Encounters:   03/29/18 121/78   09/06/17 (!) 158/101   08/31/17 120/74                Passed - Recent (12 mo) or future (30 days) visit within the authorizing provider's specialty    Patient had office visit in the last 12 months or has a visit in the next 30 days with authorizing provider or within the authorizing provider's specialty.  See \"Patient Info\" tab in inbasket, or \"Choose Columns\" in Meds & Orders section of the refill encounter.           Passed - Patient is age 18 or older       Passed - Normal serum creatinine on file in past 12 months    Recent Labs   Lab Test  07/07/17   0903   CR  1.17               "

## 2018-04-18 ENCOUNTER — TELEPHONE (OUTPATIENT)
Dept: OTOLARYNGOLOGY | Facility: CLINIC | Age: 58
End: 2018-04-18

## 2018-04-18 RX ORDER — VENLAFAXINE HYDROCHLORIDE 150 MG/1
CAPSULE, EXTENDED RELEASE ORAL
Qty: 90 CAPSULE | Refills: 0 | Status: SHIPPED | OUTPATIENT
Start: 2018-04-18 | End: 2018-09-06

## 2018-04-18 NOTE — TELEPHONE ENCOUNTER
Medication is being filled for 1 time refill only due to:  patient to follow up in 3 months per LOV note. Terrie Hudson RN

## 2018-04-18 NOTE — TELEPHONE ENCOUNTER
Plan to review below message with Dr Paige.  Called patient.  He had second sleep study, much better, but issues when sleeps on back.  He is interested in going directly to awake endoscopy procedure and thinks  may have been in touch with Dr Paige already.    Review and call back.

## 2018-04-18 NOTE — TELEPHONE ENCOUNTER
[facility] Call Center    Phone Message    May a detailed message be left on voicemail: yes    Reason for Call: Other: Pt was seen at [facility] on 4/13/18 and doctor was sending over message to Dr. Paige that pt needs an awake endoscopy (when sleeping on back, pt has some issues).  Please follow up with pt.     Action Taken: Other: Routed:  P ENT Adult CSC

## 2018-04-19 ENCOUNTER — CARE COORDINATION (OUTPATIENT)
Dept: OTOLARYNGOLOGY | Facility: CLINIC | Age: 58
End: 2018-04-19

## 2018-04-19 NOTE — PROGRESS NOTES
"The patient called inquiring about \"an awake endoscopy\".  Sleep study was received from Kristen and reviewed by Dr. Paige today.  Per Dr. Paige, he needs an advanced titration with flexible endoscopy in clinic. She will reach out to the [facility] to help coordinate this. The patient was updated. He will be called with a date and time and voiced an understanding of this information and plan.    Liliana Bingham R.N., B.S.N.  Nurse Coordinator Head & Neck Surgery  [phone]  4/19/2018 10:40 AM     "

## 2018-04-25 ENCOUNTER — TRANSFERRED RECORDS (OUTPATIENT)
Dept: HEALTH INFORMATION MANAGEMENT | Facility: CLINIC | Age: 58
End: 2018-04-25

## 2018-05-02 DIAGNOSIS — Z00.00 ROUTINE GENERAL MEDICAL EXAMINATION AT A HEALTH CARE FACILITY: ICD-10-CM

## 2018-05-02 DIAGNOSIS — E78.5 HYPERLIPIDEMIA LDL GOAL <130: ICD-10-CM

## 2018-05-02 RX ORDER — ATORVASTATIN CALCIUM 40 MG/1
TABLET, FILM COATED ORAL
Qty: 90 TABLET | Refills: 0 | Status: SHIPPED | OUTPATIENT
Start: 2018-05-02 | End: 2018-08-05

## 2018-05-02 NOTE — TELEPHONE ENCOUNTER
"Requested Prescriptions   Pending Prescriptions Disp Refills     atorvastatin (LIPITOR) 40 MG tablet [Pharmacy Med Name: ATORVASTATIN  40MG  TAB]  Last Written Prescription Date:  8/16/17  Last Fill Quantity: 90,  # refills: 2   Last office visit: 3/29/2018 with prescribing provider:  Susan   Future Office Visit:     90 tablet      Sig: TAKE 1 TABLET BY MOUTH  DAILY    Statins Protocol Passed    5/2/2018  4:41 AM       Passed - LDL on file in past 12 months    Recent Labs   Lab Test  07/21/17   1308   LDL  133*            Passed - No abnormal creatine kinase in past 12 months    No lab results found.            Passed - Recent (12 mo) or future (30 days) visit within the authorizing provider's specialty    Patient had office visit in the last 12 months or has a visit in the next 30 days with authorizing provider or within the authorizing provider's specialty.  See \"Patient Info\" tab in inbasket, or \"Choose Columns\" in Meds & Orders section of the refill encounter.           Passed - Patient is age 18 or older          "

## 2018-05-02 NOTE — TELEPHONE ENCOUNTER
Prescription approved per [facility], [facility] or [facility] refill protocol.  Yeimi Da Silva RN - Triage  [facility]

## 2018-05-11 ENCOUNTER — OFFICE VISIT (OUTPATIENT)
Dept: OTOLARYNGOLOGY | Facility: CLINIC | Age: 58
End: 2018-05-11
Payer: COMMERCIAL

## 2018-05-11 DIAGNOSIS — G47.33 OSA (OBSTRUCTIVE SLEEP APNEA): Primary | ICD-10-CM

## 2018-05-11 ASSESSMENT — PAIN SCALES - GENERAL: PAINLEVEL: NO PAIN (0)

## 2018-05-11 NOTE — PROGRESS NOTES
CHIEF COMPLAINT:  Follow-up of obstructive sleep apnea.      HISTORY OF PRESENT ILLNESS:  The patient is status post right hypoglossal nerve stimulator implant which was performed on 09/06/2017.  He has been followed at the [facility].  He has had activation and titration.  His initial titration study was poor in that the patient did not sleep very well.  Interrogation of the device shows that he actually was not using his device appropriately prior to that sleep study and then trying to use it at a high setting one week prior to that titration study which then likely led to insomnia during the night of the study.  He was able to get an additional study performed on 04/28/2018.  This showed that his overall AHI was 13.  AHI in the supine position was 20 which is an improvement from his initial AHI of 40.  Non-supine was 4.4.  We were asked by the [facility] to see if there is any further advanced titration that could be done in order to find a little bit more coverage for his supine position.      PHYSICAL EXAMINATION:  Fiberoptic laryngoscopy was performed while doing titration.  The nose was sprayed with topical lidocaine and Afrin.  A flexible endoscope was passed through the patient's left nostril.  We were observing for airway opening at the level of the velum and tongue base.  Through all settings and electrode configurations, the patient had excellent opening at the velum and tongue base.      ASSESSMENT AND PLAN:  The patient has mild residual sleep apnea overall after implant.  He needed some reinstruction on better sleep hygiene with the implant.  We discussed trying to sleep to the lateral position as much as possible.  We also discussed that if he wakes up at 3:00 or 4:00 in the morning, he can turn down the voltage if he finds it to be a little bit bothersome for the remainder of the night.  I encouraged him to use the implant every night as long as he could and download showed that he was only  using it 13 hours of the week.  We changed his configuration to minus off minus and set him from a range of 1.2 to 1.8.  The goal would be 1.5.  He has 0.1 steps between 1.2 to 1.8.  I would recommend that we get him an HFC in two months rather than an in-lab to have a more rewarding experience.  I do not think he would benefit from any additional upper airway surgery given that he has excellent velopharyngeal opening and soft palate opening.  I will communicate this to Dr. Arredondo.     I spent a total of 15 minutes face-to-face with Juni Jain during today's office visit.  Over 50% of this time was spent counseling the patient on and/or coordinating care as documented in my assessment and plan.

## 2018-05-11 NOTE — LETTER
5/11/2018       RE: Juni Jain  [address] [place] [address]     Dear Colleague,    Thank you for referring your patient, Juni Jain, to the [facility] EAR NOSE AND THROAT at [facility]. Please see a copy of my visit note below.    CHIEF COMPLAINT:  Follow-up of obstructive sleep apnea.      HISTORY OF PRESENT ILLNESS:  The patient is status post right hypoglossal nerve stimulator implant which was performed on 09/06/2017.  He has been followed at the [facility].  He has had activation and titration.  His initial titration study was poor in that the patient did not sleep very well.  Interrogation of the device shows that he actually was not using his device appropriately prior to that sleep study and then trying to use it at a high setting one week prior to that titration study which then likely led to insomnia during the night of the study.  He was able to get an additional study performed on 04/28/2018.  This showed that his overall AHI was 13.  AHI in the supine position was 20 which is an improvement from his initial AHI of 40.  Non-supine was 4.4.  We were asked by the [facility] to see if there is any further advanced titration that could be done in order to find a little bit more coverage for his supine position.      PHYSICAL EXAMINATION:  Fiberoptic laryngoscopy was performed while doing titration.  The nose was sprayed with topical lidocaine and Afrin.  A flexible endoscope was passed through the patient's left nostril.  We were observing for airway opening at the level of the velum and tongue base.  Through all settings and electrode configurations, the patient had excellent opening at the velum and tongue base.      ASSESSMENT AND PLAN:  The patient has mild residual sleep apnea overall after implant.  He needed some reinstruction on better sleep hygiene with the implant.  We discussed trying to sleep to the lateral position as much  as possible.  We also discussed that if he wakes up at 3:00 or 4:00 in the morning, he can turn down the voltage if he finds it to be a little bit bothersome for the remainder of the night.  I encouraged him to use the implant every night as long as he could and download showed that he was only using it 13 hours of the week.  We changed his configuration to minus off minus and set him from a range of 1.2 to 1.8.  The goal would be 1.5.  He has 0.1 steps between 1.2 to 1.8.  I would recommend that we get him an HFC in two months rather than an in-lab to have a more rewarding experience.  I do not think he would benefit from any additional upper airway surgery given that he has excellent velopharyngeal opening and soft palate opening.  I will communicate this to Dr. Arredondo.     I spent a total of 15 minutes face-to-face with Juni Jain during today's office visit.  Over 50% of this time was spent counseling the patient on and/or coordinating care as documented in my assessment and plan.      Again, thank you for allowing me to participate in the care of your patient.      Sincerely,    Liliana Paige MD

## 2018-05-11 NOTE — LETTER
Date:May 16, 2018      Patient was self referred, no letter generated. Do not send.        [facility] Information

## 2018-05-11 NOTE — LETTER
5/11/2018       RE: Juni Jain  [address] [place] [address]     Dear Colleague,    Thank you for referring your patient, Juni Jain, to the [facility] EAR NOSE AND THROAT at [facility]. Please see a copy of my visit note below.    CHIEF COMPLAINT:  Follow-up of obstructive sleep apnea.      HISTORY OF PRESENT ILLNESS:  The patient is status post right hypoglossal nerve stimulator implant which was performed on 09/06/2017.  He has been followed at the [facility].  He has had activation and titration.  His initial titration study was poor in that the patient did not sleep very well.  Interrogation of the device shows that he actually was not using his device appropriately prior to that sleep study and then trying to use it at a high setting one week prior to that titration study which then likely led to insomnia during the night of the study.  He was able to get an additional study performed on 04/28/2018.  This showed that his overall AHI was 13.  AHI in the supine position was 20 which is an improvement from his initial AHI of 40.  Non-supine was 4.4.  We were asked by the [facility] to see if there is any further advanced titration that could be done in order to find a little bit more coverage for his supine position.      PHYSICAL EXAMINATION:  Fiberoptic laryngoscopy was performed while doing titration.  The nose was sprayed with topical lidocaine and Afrin.  A flexible endoscope was passed through the patient's left nostril.  We were observing for airway opening at the level of the velum and tongue base.  Through all settings and electrode configurations, the patient had excellent opening at the velum and tongue base.      ASSESSMENT AND PLAN:  The patient has mild residual sleep apnea overall after implant.  He needed some reinstruction on better sleep hygiene with the implant.  We discussed trying to sleep to the lateral position as much  as possible.  We also discussed that if he wakes up at 3:00 or 4:00 in the morning, he can turn down the voltage if he finds it to be a little bit bothersome for the remainder of the night.  I encouraged him to use the implant every night as long as he could and download showed that he was only using it 13 hours of the week.  We changed his configuration to minus off minus and set him from a range of 1.2 to 1.8.  The goal would be 1.5.  He has 0.1 steps between 1.2 to 1.8.  I would recommend that we get him an HFC in two months rather than an in-lab to have a more rewarding experience.  I do not think he would benefit from any additional upper airway surgery given that he has excellent velopharyngeal opening and soft palate opening.  I will communicate this to Dr. Arredondo.     I spent a total of 15 minutes face-to-face with Juni Jain during today's office visit.  Over 50% of this time was spent counseling the patient on and/or coordinating care as documented in my assessment and plan.      Again, thank you for allowing me to participate in the care of your patient.      Sincerely,    Liliana Paige MD

## 2018-05-11 NOTE — NURSING NOTE
Chief Complaint   Patient presents with     RECHECK     Follow up Inspire Device, Endoscopy check while awake      Darius Colon

## 2018-05-11 NOTE — MR AVS SNAPSHOT
After Visit Summary   5/11/2018    Juni Jain    MRN: [id number]           Patient Information     Date Of Birth          1960        Visit Information        Provider Department      5/11/2018 4:00 PM Liliana Paige MD [facility] Ear Nose and Throat        Care Instructions    1.  You were seen in the ENT Clinic today by Dr. Paige.  If you have any questions or concerns after your appointment, please call [phone].  Press option #1 for scheduling related needs.  Press option #3 for Nurse advice.  2.  Plan is to return to clinic 4 months. Please call to schedule this appt.                Follow-ups after your visit        Your next 10 appointments already scheduled     Sep 11, 2018  1:45 PM CDT   (Arrive by 1:30 PM)   Return Visit with Liliana Paige MD   [facility] Ear Nose and Throat ([facility] [place])    [address] [place] [address] [phone]              Who to contact     Please call your clinic at [phone] to:    Ask questions about your health    Make or cancel appointments    Discuss your medicines    Learn about your test results    Speak to your doctor            Additional Information About Your Visit        doFormsharAccessbio Information     [employer] gives you secure access to your electronic health record. If you see a primary care provider, you can also send messages to your care team and make appointments. If you have questions, please call your primary care clinic.  If you do not have a primary care provider, please call [phone] and they will assist you.      [employer] is an electronic gateway that provides easy, online access to your medical records. With [employer], you can request a clinic appointment, read your test results, renew a prescription or communicate with your care team.     To access your existing account, please contact your [facility] or call [phone] for  assistance.        Care EveryWhere ID     This is your Care EveryWhere ID. This could be used by other organizations to access your [place] medical records  [id number]         Blood Pressure from Last 3 Encounters:   03/29/18 121/78   09/06/17 (!) 158/101   08/31/17 120/74    Weight from Last 3 Encounters:   03/29/18 102.1 kg (225 lb)   11/07/17 102.1 kg (225 lb)   09/06/17 101.6 kg (224 lb)              Today, you had the following     No orders found for display       Primary Care Provider Office Phone # Fax #    Ru Davis [phone]        [address]  [place] [address]        Equal Access to Services     [facility]: Hadii diana rappo Soperlitaali, waaxda luqadaha, qaybta kaalmada adeegyada, te santiago . So [facility] [phone].    ATENCIÓN: Si habla español, tiene a mitchell disposición servicios gratuitos de asistencia lingüística. [facility] [phone].    We comply with applicable federal civil rights laws and [place] laws. We do not discriminate on the basis of race, color, national origin, age, disability, sex, sexual orientation, or gender identity.            Thank you!     Thank you for choosing [facility] EAR NOSE AND THROAT  for your care. Our goal is always to provide you with excellent care. Hearing back from our patients is one way we can continue to improve our services. Please take a few minutes to complete the written survey that you may receive in the mail after your visit with us. Thank you!             Your Updated Medication List - Protect others around you: Learn how to safely use, store and throw away your medicines at www.disposemymeds.org.          This list is accurate as of 5/11/18  5:10 PM.  Always use your most recent med list.                   Brand Name Dispense Instructions for use Diagnosis    ALPRAZolam 0.25 MG tablet    XANAX    15 tablet    as directed for anxiety prior to presentations and PRN anxiety    Panic  disorder without agoraphobia, Social phobia, Routine general medical examination at a health care facility       atorvastatin 40 MG tablet    LIPITOR    90 tablet    TAKE 1 TABLET BY MOUTH  DAILY    Hyperlipidemia LDL goal <130, Routine general medical examination at a health care facility       mupirocin 2 % ointment    BACTROBAN          omega-3 fatty acids 1200 MG capsule     180 capsule    Take 2 capsules by mouth daily.        propranolol 10 MG tablet    INDERAL    15 tablet    TAKE 1-2 TABLETS BY MOUTH 90 MINUTES BEFORE PRESENTATION    Social phobia       * venlafaxine 150 MG 24 hr capsule    EFFEXOR-XR    30 capsule    Take 1 capsule (150 mg) by mouth daily    Social phobia       * venlafaxine 150 MG 24 hr capsule    EFFEXOR-XR    90 capsule    TAKE 1 CAPSULE BY MOUTH  DAILY    Panic disorder without agoraphobia, Routine general medical examination at a health care facility, Social phobia       VITAMIN D (CHOLECALCIFEROL) PO      Take 2-4 tablets by mouth daily.        * Notice:  This list has 2 medication(s) that are the same as other medications prescribed for you. Read the directions carefully, and ask your doctor or other care provider to review them with you.

## 2018-05-11 NOTE — PATIENT INSTRUCTIONS
1.  You were seen in the ENT Clinic today by Dr. Paige.  If you have any questions or concerns after your appointment, please call [phone].  Press option #1 for scheduling related needs.  Press option #3 for Nurse advice.  2.  Plan is to return to clinic 4 months. Please call to schedule this appt.

## 2018-08-05 DIAGNOSIS — Z00.00 ROUTINE GENERAL MEDICAL EXAMINATION AT A HEALTH CARE FACILITY: ICD-10-CM

## 2018-08-05 DIAGNOSIS — E78.5 HYPERLIPIDEMIA LDL GOAL <130: ICD-10-CM

## 2018-08-06 RX ORDER — ATORVASTATIN CALCIUM 40 MG/1
TABLET, FILM COATED ORAL
Qty: 30 TABLET | Refills: 0 | Status: SHIPPED | OUTPATIENT
Start: 2018-08-06 | End: 2018-09-06

## 2018-08-06 NOTE — TELEPHONE ENCOUNTER
Medication is being filled for 1 time refill only due to:  Per Dr Davis's note patient was to have px in July.  Due for fasting labs   Leni Vazquez RN- Triage [employer]

## 2018-08-06 NOTE — TELEPHONE ENCOUNTER
"Requested Prescriptions   Pending Prescriptions Disp Refills     atorvastatin (LIPITOR) 40 MG tablet [Pharmacy Med Name: ATORVASTATIN  40MG  TAB]  Last Written Prescription Date:  5/2/18  Last Fill Quantity: 90,  # refills: 0   Last office visit: 3/29/2018 with prescribing provider:  Susan   Future Office Visit:     90 tablet      Sig: TAKE 1 TABLET BY MOUTH  DAILY    Statins Protocol Failed    8/5/2018  3:45 AM       Failed - LDL on file in past 12 months    Recent Labs   Lab Test  07/21/17   1308   LDL  133*            Passed - No abnormal creatine kinase in past 12 months    No lab results found.            Passed - Recent (12 mo) or future (30 days) visit within the authorizing provider's specialty    Patient had office visit in the last 12 months or has a visit in the next 30 days with authorizing provider or within the authorizing provider's specialty.  See \"Patient Info\" tab in inbasket, or \"Choose Columns\" in Meds & Orders section of the refill encounter.           Passed - Patient is age 18 or older          "

## 2018-08-06 NOTE — TELEPHONE ENCOUNTER
Medication is being filled for 1 time refill only due to:  Per last ov Dr Davis wanted patient to have fasting physical in July.     routing to team to schedule appt  Leni Vazquez RN- Triage [employer]

## 2018-08-07 ENCOUNTER — TRANSFERRED RECORDS (OUTPATIENT)
Dept: HEALTH INFORMATION MANAGEMENT | Facility: CLINIC | Age: 58
End: 2018-08-07

## 2018-08-13 ENCOUNTER — TRANSFERRED RECORDS (OUTPATIENT)
Dept: HEALTH INFORMATION MANAGEMENT | Facility: CLINIC | Age: 58
End: 2018-08-13

## 2018-09-06 ENCOUNTER — OFFICE VISIT (OUTPATIENT)
Dept: FAMILY MEDICINE | Facility: CLINIC | Age: 58
End: 2018-09-06
Payer: COMMERCIAL

## 2018-09-06 VITALS
BODY MASS INDEX: 29.42 KG/M2 | WEIGHT: 222 LBS | HEIGHT: 73 IN | HEART RATE: 80 BPM | DIASTOLIC BLOOD PRESSURE: 88 MMHG | SYSTOLIC BLOOD PRESSURE: 120 MMHG | TEMPERATURE: 96.1 F

## 2018-09-06 DIAGNOSIS — Z11.4 SCREENING FOR HIV (HUMAN IMMUNODEFICIENCY VIRUS): Primary | ICD-10-CM

## 2018-09-06 DIAGNOSIS — Z23 NEED FOR PROPHYLACTIC VACCINATION AND INOCULATION AGAINST INFLUENZA: ICD-10-CM

## 2018-09-06 DIAGNOSIS — F40.10 SOCIAL PHOBIA: ICD-10-CM

## 2018-09-06 DIAGNOSIS — F41.1 GAD (GENERALIZED ANXIETY DISORDER): ICD-10-CM

## 2018-09-06 DIAGNOSIS — G47.33 OSA (OBSTRUCTIVE SLEEP APNEA): ICD-10-CM

## 2018-09-06 DIAGNOSIS — Z12.5 SCREENING FOR PROSTATE CANCER: ICD-10-CM

## 2018-09-06 DIAGNOSIS — E78.5 HYPERLIPIDEMIA LDL GOAL <130: ICD-10-CM

## 2018-09-06 DIAGNOSIS — Z00.00 ROUTINE GENERAL MEDICAL EXAMINATION AT A HEALTH CARE FACILITY: ICD-10-CM

## 2018-09-06 DIAGNOSIS — K21.9 GASTROESOPHAGEAL REFLUX DISEASE WITHOUT ESOPHAGITIS: ICD-10-CM

## 2018-09-06 LAB
ALBUMIN SERPL-MCNC: 4.4 G/DL (ref 3.4–5)
ALP SERPL-CCNC: 99 U/L (ref 40–150)
ALT SERPL W P-5'-P-CCNC: 69 U/L (ref 0–70)
ANION GAP SERPL CALCULATED.3IONS-SCNC: 9 MMOL/L (ref 3–14)
AST SERPL W P-5'-P-CCNC: 26 U/L (ref 0–45)
BILIRUB SERPL-MCNC: 1.4 MG/DL (ref 0.2–1.3)
BUN SERPL-MCNC: 22 MG/DL (ref 7–30)
CALCIUM SERPL-MCNC: 9 MG/DL (ref 8.5–10.1)
CHLORIDE SERPL-SCNC: 104 MMOL/L (ref 94–109)
CHOLEST SERPL-MCNC: 224 MG/DL
CO2 SERPL-SCNC: 25 MMOL/L (ref 20–32)
CREAT SERPL-MCNC: 1.22 MG/DL (ref 0.66–1.25)
GFR SERPL CREATININE-BSD FRML MDRD: 61 ML/MIN/1.7M2
GLUCOSE SERPL-MCNC: 109 MG/DL (ref 70–99)
HDLC SERPL-MCNC: 37 MG/DL
HIV 1+2 AB+HIV1 P24 AG SERPL QL IA: NONREACTIVE
LDLC SERPL CALC-MCNC: 142 MG/DL
NONHDLC SERPL-MCNC: 187 MG/DL
POTASSIUM SERPL-SCNC: 4.2 MMOL/L (ref 3.4–5.3)
PROT SERPL-MCNC: 7.6 G/DL (ref 6.8–8.8)
PSA SERPL-ACNC: 2.43 UG/L (ref 0–4)
SODIUM SERPL-SCNC: 138 MMOL/L (ref 133–144)
TRIGL SERPL-MCNC: 224 MG/DL

## 2018-09-06 PROCEDURE — 36415 COLL VENOUS BLD VENIPUNCTURE: CPT | Performed by: FAMILY MEDICINE

## 2018-09-06 PROCEDURE — 99396 PREV VISIT EST AGE 40-64: CPT | Mod: 25 | Performed by: FAMILY MEDICINE

## 2018-09-06 PROCEDURE — 80061 LIPID PANEL: CPT | Performed by: FAMILY MEDICINE

## 2018-09-06 PROCEDURE — 80053 COMPREHEN METABOLIC PANEL: CPT | Performed by: FAMILY MEDICINE

## 2018-09-06 PROCEDURE — G0103 PSA SCREENING: HCPCS | Performed by: FAMILY MEDICINE

## 2018-09-06 PROCEDURE — 90686 IIV4 VACC NO PRSV 0.5 ML IM: CPT | Performed by: FAMILY MEDICINE

## 2018-09-06 PROCEDURE — 87389 HIV-1 AG W/HIV-1&-2 AB AG IA: CPT | Performed by: FAMILY MEDICINE

## 2018-09-06 PROCEDURE — 90471 IMMUNIZATION ADMIN: CPT | Performed by: FAMILY MEDICINE

## 2018-09-06 RX ORDER — ALPRAZOLAM 0.25 MG
TABLET ORAL
Qty: 15 TABLET | Refills: 1 | Status: SHIPPED | OUTPATIENT
Start: 2018-09-06 | End: 2019-06-10

## 2018-09-06 RX ORDER — VENLAFAXINE HYDROCHLORIDE 150 MG/1
150 CAPSULE, EXTENDED RELEASE ORAL DAILY
Qty: 90 CAPSULE | Refills: 3 | Status: SHIPPED | OUTPATIENT
Start: 2018-09-06 | End: 2019-09-15

## 2018-09-06 RX ORDER — VENLAFAXINE HYDROCHLORIDE 150 MG/1
150 CAPSULE, EXTENDED RELEASE ORAL DAILY
Qty: 30 CAPSULE | Refills: 2 | Status: SHIPPED | OUTPATIENT
Start: 2018-09-06 | End: 2018-09-06

## 2018-09-06 RX ORDER — ATORVASTATIN CALCIUM 40 MG/1
TABLET, FILM COATED ORAL
Qty: 90 TABLET | Refills: 3 | Status: SHIPPED | OUTPATIENT
Start: 2018-09-06 | End: 2019-08-03

## 2018-09-06 RX ORDER — PROPRANOLOL HYDROCHLORIDE 10 MG/1
TABLET ORAL
Qty: 15 TABLET | Refills: 3 | Status: SHIPPED | OUTPATIENT
Start: 2018-09-06 | End: 2020-01-02

## 2018-09-06 ASSESSMENT — ANXIETY QUESTIONNAIRES
7. FEELING AFRAID AS IF SOMETHING AWFUL MIGHT HAPPEN: NOT AT ALL
2. NOT BEING ABLE TO STOP OR CONTROL WORRYING: SEVERAL DAYS
3. WORRYING TOO MUCH ABOUT DIFFERENT THINGS: NOT AT ALL
5. BEING SO RESTLESS THAT IT IS HARD TO SIT STILL: NOT AT ALL
6. BECOMING EASILY ANNOYED OR IRRITABLE: SEVERAL DAYS
GAD7 TOTAL SCORE: 3
1. FEELING NERVOUS, ANXIOUS, OR ON EDGE: SEVERAL DAYS
IF YOU CHECKED OFF ANY PROBLEMS ON THIS QUESTIONNAIRE, HOW DIFFICULT HAVE THESE PROBLEMS MADE IT FOR YOU TO DO YOUR WORK, TAKE CARE OF THINGS AT HOME, OR GET ALONG WITH OTHER PEOPLE: NOT DIFFICULT AT ALL

## 2018-09-06 ASSESSMENT — PATIENT HEALTH QUESTIONNAIRE - PHQ9: 5. POOR APPETITE OR OVEREATING: NOT AT ALL

## 2018-09-06 NOTE — MR AVS SNAPSHOT
After Visit Summary   9/6/2018    Juni Jain    MRN: [id number]           Patient Information     Date Of Birth          1960        Visit Information        Provider Department      9/6/2018 9:00 AM Ru Davis MD [facility]        Today's Diagnoses     Screening for HIV (human immunodeficiency virus)    -  1    Routine general medical examination at a health care facility        Social phobia        REY (obstructive sleep apnea)        Hyperlipidemia LDL goal <130        ELVIS (generalized anxiety disorder)        Gastroesophageal reflux disease without esophagitis        Screening for prostate cancer          Care Instructions      Preventive Health Recommendations  Male Ages 50 - 64    Yearly exam:             See your health care provider every year in order to  o   Review health changes.   o   Discuss preventive care.    o   Review your medicines if your doctor has prescribed any.     Have a cholesterol test every 5 years, or more frequently if you are at risk for high cholesterol/heart disease.     Have a diabetes test (fasting glucose) every three years. If you are at risk for diabetes, you should have this test more often.     Have a colonoscopy at age 50, or have a yearly FIT test (stool test). These exams will check for colon cancer.      Talk with your health care provider about whether or not a prostate cancer screening test (PSA) is right for you.    You should be tested each year for STDs (sexually transmitted diseases), if you re at risk.     Shots: Get a flu shot each year. Get a tetanus shot every 10 years.     Nutrition:    Eat at least 5 servings of fruits and vegetables daily.     Eat whole-grain bread, whole-wheat pasta and brown rice instead of white grains and rice.     Get adequate Calcium and Vitamin D.     Lifestyle    Exercise for at least 150 minutes a week (30 minutes a day, 5 days a week). This will help you control your weight and prevent  disease.     Limit alcohol to one drink per day.     No smoking.     Wear sunscreen to prevent skin cancer.     See your dentist every six months for an exam and cleaning.     See your eye doctor every 1 to 2 years.            Follow-ups after your visit        Follow-up notes from your care team     Return in about 1 year (around 9/6/2019) for Physical Exam.      Your next 10 appointments already scheduled     Sep 11, 2018  1:45 PM CDT   (Arrive by 1:30 PM)   Return Visit with Liliana Paige MD   [facility] ([facility])    [address]  [address] [facility] [address] [phone]              Who to contact     If you have questions or need follow up information about today's clinic visit or your schedule please contact [facility] [place] directly at [phone].  Normal or non-critical lab and imaging results will be communicated to you by MyChart, letter or phone within 4 business days after the clinic has received the results. If you do not hear from us within 7 days, please contact the clinic through [employer] or phone. If you have a critical or abnormal lab result, we will notify you by phone as soon as possible.  Submit refill requests through [employer] or call your pharmacy and they will forward the refill request to us. Please allow 3 business days for your refill to be completed.          Additional Information About Your Visit        MyChart Information     [employer] gives you secure access to your electronic health record. If you see a primary care provider, you can also send messages to your care team and make appointments. If you have questions, please call your primary care clinic.  If you do not have a primary care provider, please call [phone] and they will assist you.        Care EveryWhere ID     This is your Care EveryWhere ID. This could be used by other organizations to access your [facility]  "records  [id number]        Your Vitals Were     Pulse Temperature Height BMI (Body Mass Index)          80 96.1  F (35.6  C) (Tympanic) 6' 1\" (1.854 m) 29.29 kg/m2         Blood Pressure from Last 3 Encounters:   09/06/18 120/88   03/29/18 121/78   09/06/17 (!) 158/101    Weight from Last 3 Encounters:   09/06/18 222 lb (100.7 kg)   03/29/18 225 lb (102.1 kg)   11/07/17 225 lb (102.1 kg)              We Performed the Following     Comprehensive metabolic panel     HIV Screening     Lipid panel reflex to direct LDL Fasting     PSA, screen          Today's Medication Changes          These changes are accurate as of 9/6/18  9:22 AM.  If you have any questions, ask your nurse or doctor.               Start taking these medicines.        Dose/Directions    venlafaxine 150 MG 24 hr capsule   Commonly known as:  EFFEXOR-XR   Used for:  Social phobia, Routine general medical examination at a health care facility   Started by:  Ru Davis MD        Dose:  150 mg   Take 1 capsule (150 mg) by mouth daily   Quantity:  90 capsule   Refills:  3            Where to get your medicines      These medications were sent to [id number] [place] - [address]  [address] [facility] [address]     Phone:  [phone]     propranolol 10 MG tablet         These medications were sent to [facility] - [place] [address] [facility] [address], [facility] [address]     Phone:  [phone]     atorvastatin 40 MG tablet    venlafaxine 150 MG 24 hr capsule         Some of these will need a paper prescription and others can be bought over the counter.  Ask your nurse if you have questions.     Bring a paper prescription for each of these medications     ALPRAZolam 0.25 MG tablet                Primary Care Provider Office Phone # Fax #    Ru Davis [phone]       [address]  [place] [address]        Equal Access to Services "     CHANDLER ALLISON : Hadii aad ku hadmary joo Soperlitaali, waaxda luqadaha, qaybta kaalmada adeegjanessada, te marychuy juanhesham lynn jarrodyvon santiago . So [facility] [phone].    ATENCIÓN: Si habla español, tiene a mitchell disposición servicios gratuitos de asistencia lingüística. Maureename al [phone].    We comply with applicable federal civil rights laws and [place] laws. We do not discriminate on the basis of race, color, national origin, age, disability, sex, sexual orientation, or gender identity.            Thank you!     Thank you for choosing [facility] [place]  for your care. Our goal is always to provide you with excellent care. Hearing back from our patients is one way we can continue to improve our services. Please take a few minutes to complete the written survey that you may receive in the mail after your visit with us. Thank you!             Your Updated Medication List - Protect others around you: Learn how to safely use, store and throw away your medicines at www.disposemymeds.org.          This list is accurate as of 9/6/18  9:22 AM.  Always use your most recent med list.                   Brand Name Dispense Instructions for use Diagnosis    ALPRAZolam 0.25 MG tablet    XANAX    15 tablet    as directed for anxiety prior to presentations and PRN anxiety    Social phobia, Routine general medical examination at a health care facility, ELVIS (generalized anxiety disorder)       atorvastatin 40 MG tablet    LIPITOR    90 tablet    TAKE 1 TABLET BY MOUTH  DAILY    Hyperlipidemia LDL goal <130, Routine general medical examination at a health care facility       mupirocin 2 % ointment    BACTROBAN          omega-3 fatty acids 1200 MG capsule     180 capsule    Take 2 capsules by mouth daily.        propranolol 10 MG tablet    INDERAL    15 tablet    TAKE 1-2 TABLETS BY MOUTH 90 MINUTES BEFORE PRESENTATION    Social phobia, Routine general medical examination at a health care facility       venlafaxine 150 MG 24 hr  capsule    EFFEXOR-XR    90 capsule    Take 1 capsule (150 mg) by mouth daily    Social phobia, Routine general medical examination at a health care facility       VITAMIN D (CHOLECALCIFEROL) PO      Take 2-4 tablets by mouth daily.

## 2018-09-06 NOTE — PROGRESS NOTES

## 2018-09-06 NOTE — PROGRESS NOTES
SUBJECTIVE:   CC: Juni Jain is an 57 year old male who presents for preventative health visit.     Healthy Habits:    Do you get at least three servings of calcium containing foods daily (dairy, green leafy vegetables, etc.)? yes    Amount of exercise or daily activities, outside of work: 5 day(s) per week    Problems taking medications regularly No    Medication side effects: No    Have you had an eye exam in the past two years? yes    Do you see a dentist twice per year? yes    Do you have sleep apnea, excessive snoring or daytime drowsiness?yes       PROBLEMS TO ADD ON... Patient has history of GERD he seen gastroenterologist and planning to see them.  If necessary then he will let us know if endoscopy is needed.  Otherwise gastroenterologist can order that.  He had procedure done for sleep apnea and he finds it very helpful.  Though still working with the surgeon office regarding adjustment.  Situational anxiety /generalized anxiety takes Inderal as needed along with Xanax.  He use Effexor.      Today's PHQ-2 Score:   PHQ-2 ( 1999 Pfizer) 3/29/2018 1/19/2018   Q1: Little interest or pleasure in doing things 0 0   Q2: Feeling down, depressed or hopeless 0 0   PHQ-2 Score 0 0   Q1: Little interest or pleasure in doing things - -   Q2: Feeling down, depressed or hopeless - -   PHQ-2 Score - -       Abuse: Current or Past(Physical, Sexual or Emotional)- NO  Do you feel safe in your environment - YES    Social History   Substance Use Topics     Smoking status: Never Smoker     Smokeless tobacco: Never Used      Comment: Do not smoke     Alcohol use 0.0 oz/week      Comment: Weekends      If you drink alcohol do you typically have >3 drinks per day or >7 drinks per week? No                      Last PSA:   PSA   Date Value Ref Range Status   07/21/2017 1.23 0 - 4 ug/L Final     Comment:     Assay Method:  Chemiluminescence using Siemens Vista analyzer       Reviewed orders with patient. Reviewed health  "maintenance and updated orders accordingly - Yes      Reviewed and updated as needed this visit by clinical staff  Tobacco  Allergies  Meds         Reviewed and updated as needed this visit by Provider            ROS:  CONSTITUTIONAL: NEGATIVE for fever, chills, change in weight  INTEGUMENTARY/SKIN: NEGATIVE for worrisome rashes, moles or lesions  EYES: NEGATIVE for vision changes or irritation  ENT: NEGATIVE for ear, mouth and throat problems  RESP: NEGATIVE for significant cough or SOB  CV: NEGATIVE for chest pain, palpitations or peripheral edema  GI: NEGATIVE for nausea, abdominal pain, heartburn, or change in bowel habits   male: negative for dysuria, hematuria, decreased urinary stream, erectile dysfunction, urethral discharge  MUSCULOSKELETAL: NEGATIVE for significant arthralgias or myalgia  NEURO: NEGATIVE for weakness, dizziness or paresthesias  PSYCHIATRIC: NEGATIVE for changes in mood or affect    OBJECTIVE:   /88 (BP Location: Right arm, Patient Position: Chair, Cuff Size: Adult Regular)  Pulse 80  Temp 96.1  F (35.6  C) (Tympanic)  Ht 6' 1\" (1.854 m)  Wt 222 lb (100.7 kg)  BMI 29.29 kg/m2  EXAM:  GENERAL: healthy, alert and no distress  EYES: Eyes grossly normal to inspection, PERRL and conjunctivae and sclerae normal  HENT: ear canals and TM's normal, nose and mouth without ulcers or lesions  NECK: no adenopathy, no asymmetry, masses, or scars and thyroid normal to palpation  RESP: lungs clear to auscultation - no rales, rhonchi or wheezes  CV: regular rate and rhythm, normal S1 S2, no S3 or S4, no murmur, click or rub, no peripheral edema and peripheral pulses strong  ABDOMEN: soft, nontender, no hepatosplenomegaly, no masses and bowel sounds normal  MS: no gross musculoskeletal defects noted, no edema  SKIN: no suspicious lesions or rashes  NEURO: Normal strength and tone, mentation intact and speech normal  PSYCH: mentation appears normal, affect normal/bright    Diagnostic Test " Results:  none     ASSESSMENT/PLAN:   1. Routine general medical examination at a health care facility    - HIV Screening  - ALPRAZolam (XANAX) 0.25 MG tablet; as directed for anxiety prior to presentations and PRN anxiety  Dispense: 15 tablet; Refill: 1  - propranolol (INDERAL) 10 MG tablet; TAKE 1-2 TABLETS BY MOUTH 90 MINUTES BEFORE PRESENTATION  Dispense: 15 tablet; Refill: 3  - Lipid panel reflex to direct LDL Fasting  - Comprehensive metabolic panel  - venlafaxine (EFFEXOR-XR) 150 MG 24 hr capsule; Take 1 capsule (150 mg) by mouth daily  Dispense: 90 capsule; Refill: 3  - atorvastatin (LIPITOR) 40 MG tablet; TAKE 1 TABLET BY MOUTH  DAILY  Dispense: 90 tablet; Refill: 3    2. Screening for HIV (human immunodeficiency virus)    - HIV Screening    3. Social phobia    - ALPRAZolam (XANAX) 0.25 MG tablet; as directed for anxiety prior to presentations and PRN anxiety  Dispense: 15 tablet; Refill: 1  - propranolol (INDERAL) 10 MG tablet; TAKE 1-2 TABLETS BY MOUTH 90 MINUTES BEFORE PRESENTATION  Dispense: 15 tablet; Refill: 3  - venlafaxine (EFFEXOR-XR) 150 MG 24 hr capsule; Take 1 capsule (150 mg) by mouth daily  Dispense: 90 capsule; Refill: 3    4. REY (obstructive sleep apnea)      5. Hyperlipidemia LDL goal <130    - Lipid panel reflex to direct LDL Fasting  - Comprehensive metabolic panel  - atorvastatin (LIPITOR) 40 MG tablet; TAKE 1 TABLET BY MOUTH  DAILY  Dispense: 90 tablet; Refill: 3    6. ELVIS (generalized anxiety disorder)    - ALPRAZolam (XANAX) 0.25 MG tablet; as directed for anxiety prior to presentations and PRN anxiety  Dispense: 15 tablet; Refill: 1    7. Gastroesophageal reflux disease without esophagitis  Advised getting it checked through gastroenterologist.  If necessary they can order endoscopy.  If any she will let us know    8. Screening for prostate cancer    - PSA, screen    COUNSELING:  Reviewed preventive health counseling, as reflected in patient instructions       Regular exercise        "Healthy diet/nutrition    BP Readings from Last 1 Encounters:   09/06/18 120/88     Estimated body mass index is 29.29 kg/(m^2) as calculated from the following:    Height as of this encounter: 6' 1\" (1.854 m).    Weight as of this encounter: 222 lb (100.7 kg).           reports that he has never smoked. He has never used smokeless tobacco.      Counseling Resources:  ATP IV Guidelines  Pooled Cohorts Equation Calculator  FRAX Risk Assessment  ICSI Preventive Guidelines  Dietary Guidelines for Americans, 2010  USDA's MyPlate  ASA Prophylaxis  Lung CA Screening    Ru Davis MD  [facility]  "

## 2018-09-07 ASSESSMENT — PATIENT HEALTH QUESTIONNAIRE - PHQ9: SUM OF ALL RESPONSES TO PHQ QUESTIONS 1-9: 3

## 2018-09-07 ASSESSMENT — ANXIETY QUESTIONNAIRES: GAD7 TOTAL SCORE: 3

## 2018-09-18 ENCOUNTER — ALLIED HEALTH/NURSE VISIT (OUTPATIENT)
Dept: NURSING | Facility: CLINIC | Age: 58
End: 2018-09-18
Payer: COMMERCIAL

## 2018-09-18 DIAGNOSIS — Z23 NEED FOR VACCINATION: Primary | ICD-10-CM

## 2018-09-18 PROCEDURE — 90471 IMMUNIZATION ADMIN: CPT

## 2018-09-18 PROCEDURE — 99207 ZZC NO CHARGE NURSE ONLY: CPT

## 2018-09-18 PROCEDURE — 90750 HZV VACC RECOMBINANT IM: CPT

## 2018-09-28 ENCOUNTER — OFFICE VISIT (OUTPATIENT)
Dept: OTOLARYNGOLOGY | Facility: CLINIC | Age: 58
End: 2018-09-28
Payer: COMMERCIAL

## 2018-09-28 DIAGNOSIS — G47.33 OSA (OBSTRUCTIVE SLEEP APNEA): ICD-10-CM

## 2018-09-28 DIAGNOSIS — A49.01 INFECTION DUE TO STAPHYLOCOCCUS AUREUS: Primary | ICD-10-CM

## 2018-09-28 NOTE — PATIENT INSTRUCTIONS
1.  You were seen in the ENT Clinic today by Dr. Paige.  If you have any questions or concerns after your appointment, please call [phone].  Press option #1 for scheduling related needs.  Press option #3 for Nurse advice.  2.  Plan is to return to clinic as needed

## 2018-09-28 NOTE — PROGRESS NOTES
CC: f/u Inspire implant    HPI:  Patient returns to clinic today for follow-up after advanced titration of his inspire implant.  He did have a home sleep study performed in August 14, 2018 to retest the settings.  The overall RDI was 4.6 on this home study.  Patient reports that he is sleeping better and he is tolerating the device pretty well.  When he does wake up to go to the bathroom he does have a positive ice because it can be somewhat bothersome.    On a different note patient with like me to evaluate his nose.  He has had issues with staph infection inside his nose.  Bit more recently he has been having pustules on the skin of his nose.  He seen a dermatologist for this in the past and they have not found a reason for why this occurs.  He will get these pustules elsewhere on his body like on his back.  Over the summer he did not have any issues with this but now he is noticing it starting to return.  Is getting a second opinion from another dermatologist.  He is hoping that I will do a nasal swab for him to see if there is staph in his nose today.    PE:  GEN: nad  NECK: Neck incision is well-healed  NOSE: Septum is midline.  Overall the tissues appear healthy with no irritation or rhinorrhea present.  Skin of the bridge of the nose appears normal.    A/P:  Patient is doing well from an obstructive sleep apnea standpoint.  Inspire implant has adequately controlled his obstructive sleep apnea.  I recommended yearly follow-ups with both myself and Dr. Arredondo.  In terms of his nose in the pustules I do agree that he should see another dermatologist to see what is going on.  I did take a swab of his nose as requested and we will send that to see if there is a staff present.  I did discuss with the patient that its I think rare for intranasal staff to affect the outside of the nose though.    I spent a total of 15 minutes face-to-face with Juni Jain during today's office visit.  Over 50% of this time was  spent counseling the patient on and/or coordinating care as documented in my assessment and plan.

## 2018-09-28 NOTE — MR AVS SNAPSHOT
After Visit Summary   9/28/2018    Juni Jain    MRN: [id number]           Patient Information     Date Of Birth          1960        Visit Information        Provider Department      9/28/2018 4:30 PM Liliana Paige MD [facility] Ear Nose and Throat        Today's Diagnoses     Infection due to Staphylococcus aureus    -  1    REY (obstructive sleep apnea)          Care Instructions    1.  You were seen in the ENT Clinic today by Dr. Paige.  If you have any questions or concerns after your appointment, please call [phone].  Press option #1 for scheduling related needs.  Press option #3 for Nurse advice.  2.  Plan is to return to clinic as needed              Follow-ups after your visit        Your next 10 appointments already scheduled     Dec 28, 2018 10:15 AM CST   Nurse Only with EC MA/LPN   [facility] [place] [facility] [place] [facility]    [address] [place] [id number] [phone]              Who to contact     Please call your clinic at [phone] to:    Ask questions about your health    Make or cancel appointments    Discuss your medicines    Learn about your test results    Speak to your doctor            Additional Information About Your Visit        [employer] Information     [employer] gives you secure access to your electronic health record. If you see a primary care provider, you can also send messages to your care team and make appointments. If you have questions, please call your primary care clinic.  If you do not have a primary care provider, please call [phone] and they will assist you.      [employer] is an electronic gateway that provides easy, online access to your medical records. With [employer], you can request a clinic appointment, read your test results, renew a prescription or communicate with your care team.     To access your existing account, please contact your [facility] or  call [phone] for assistance.        Care EveryWhere ID     This is your Care EveryWhere ID. This could be used by other organizations to access your [place] medical records  [id number]         Blood Pressure from Last 3 Encounters:   09/06/18 120/88   03/29/18 121/78   09/06/17 (!) 158/101    Weight from Last 3 Encounters:   09/06/18 100.7 kg (222 lb)   03/29/18 102.1 kg (225 lb)   11/07/17 102.1 kg (225 lb)              We Performed the Following     Nose Culture Aerobic Bacterial        Primary Care Provider Office Phone # Fax #    Ru Davis [phone]       [address]  [place] [address]        Equal Access to Services     CHANDLER ALLISON : Hadii aad ku hadasho Soanibal, waaxda luqadaha, qaybta kaalmada adeegyada, te santiago . So [facility] [phone].    ATENCIÓN: Si habla español, tiene a mitchell disposición servicios gratuitos de asistencia lingüística. Llame al [phone].    We comply with applicable federal civil rights laws and [place] laws. We do not discriminate on the basis of race, color, national origin, age, disability, sex, sexual orientation, or gender identity.            Thank you!     Thank you for choosing [facility] EAR NOSE AND THROAT  for your care. Our goal is always to provide you with excellent care. Hearing back from our patients is one way we can continue to improve our services. Please take a few minutes to complete the written survey that you may receive in the mail after your visit with us. Thank you!             Your Updated Medication List - Protect others around you: Learn how to safely use, store and throw away your medicines at www.disposemymeds.org.          This list is accurate as of 9/28/18  4:55 PM.  Always use your most recent med list.                   Brand Name Dispense Instructions for use Diagnosis    ALPRAZolam 0.25 MG tablet    XANAX    15 tablet    as directed for anxiety prior to presentations and PRN  anxiety    Social phobia, Routine general medical examination at a health care facility, ELVIS (generalized anxiety disorder)       atorvastatin 40 MG tablet    LIPITOR    90 tablet    TAKE 1 TABLET BY MOUTH  DAILY    Hyperlipidemia LDL goal <130, Routine general medical examination at a health care facility       mupirocin 2 % ointment    BACTROBAN          omega-3 fatty acids 1200 MG capsule     180 capsule    Take 2 capsules by mouth daily.        propranolol 10 MG tablet    INDERAL    15 tablet    TAKE 1-2 TABLETS BY MOUTH 90 MINUTES BEFORE PRESENTATION    Social phobia, Routine general medical examination at a health care facility       venlafaxine 150 MG 24 hr capsule    EFFEXOR-XR    90 capsule    Take 1 capsule (150 mg) by mouth daily    Social phobia, Routine general medical examination at a health care facility       VITAMIN D (CHOLECALCIFEROL) PO      Take 2-4 tablets by mouth daily.

## 2018-09-28 NOTE — LETTER
9/28/2018       RE: Juni Jain  [address] [place] [address]     Dear Colleague,    Thank you for referring your patient, Juni Jain, to the [facility] EAR NOSE AND THROAT at [facility]. Please see a copy of my visit note below.    CC: f/u Inspire implant    HPI:  Patient returns to clinic today for follow-up after advanced titration of his inspire implant.  He did have a home sleep study performed in August 14, 2018 to retest the settings.  The overall RDI was 4.6 on this home study.  Patient reports that he is sleeping better and he is tolerating the device pretty well.  When he does wake up to go to the bathroom he does have a positive ice because it can be somewhat bothersome.    On a different note patient with like me to evaluate his nose.  He has had issues with staph infection inside his nose.  Bit more recently he has been having pustules on the skin of his nose.  He seen a dermatologist for this in the past and they have not found a reason for why this occurs.  He will get these pustules elsewhere on his body like on his back.  Over the summer he did not have any issues with this but now he is noticing it starting to return.  Is getting a second opinion from another dermatologist.  He is hoping that I will do a nasal swab for him to see if there is staph in his nose today.    PE:  GEN: nad  NECK: Neck incision is well-healed  NOSE: Septum is midline.  Overall the tissues appear healthy with no irritation or rhinorrhea present.  Skin of the bridge of the nose appears normal.    A/P:  Patient is doing well from an obstructive sleep apnea standpoint.  Inspire implant has adequately controlled his obstructive sleep apnea.  I recommended yearly follow-ups with both myself and Dr. Arredondo.  In terms of his nose in the pustules I do agree that he should see another dermatologist to see what is going on.  I did take a swab of his nose as requested  and we will send that to see if there is a staff present.  I did discuss with the patient that its I think rare for intranasal staff to affect the outside of the nose though.    I spent a total of 15 minutes face-to-face with Juni Jain during today's office visit.  Over 50% of this time was spent counseling the patient on and/or coordinating care as documented in my assessment and plan.      Again, thank you for allowing me to participate in the care of your patient.      Sincerely,    Liliana Paige MD

## 2018-09-28 NOTE — NURSING NOTE
Chief Complaint   Patient presents with     RECHECK     inspire advanced titration     Masood eW[employer]

## 2018-09-30 LAB
BACTERIA SPEC CULT: NORMAL
SPECIMEN SOURCE: NORMAL

## 2018-10-11 ENCOUNTER — TRANSFERRED RECORDS (OUTPATIENT)
Dept: HEALTH INFORMATION MANAGEMENT | Facility: CLINIC | Age: 58
End: 2018-10-11

## 2018-10-31 ENCOUNTER — TRANSFERRED RECORDS (OUTPATIENT)
Dept: HEALTH INFORMATION MANAGEMENT | Facility: CLINIC | Age: 58
End: 2018-10-31

## 2018-12-02 ENCOUNTER — TRANSFERRED RECORDS (OUTPATIENT)
Dept: HEALTH INFORMATION MANAGEMENT | Facility: CLINIC | Age: 58
End: 2018-12-02

## 2018-12-28 ENCOUNTER — ALLIED HEALTH/NURSE VISIT (OUTPATIENT)
Dept: NURSING | Facility: CLINIC | Age: 58
End: 2018-12-28
Payer: COMMERCIAL

## 2018-12-28 DIAGNOSIS — Z23 NEED FOR VACCINATION: Primary | ICD-10-CM

## 2018-12-28 PROCEDURE — 99207 ZZC NO CHARGE LOS: CPT

## 2018-12-28 PROCEDURE — 90750 HZV VACC RECOMBINANT IM: CPT

## 2018-12-28 PROCEDURE — 90471 IMMUNIZATION ADMIN: CPT

## 2018-12-28 NOTE — PROGRESS NOTES
Screening Questionnaire for Adult Immunization    Are you sick today?   No   Do you have allergies to medications, food, a vaccine component or latex?   No   Have you ever had a serious reaction after receiving a vaccination?   No   Do you have a long-term health problem with heart disease, lung disease, asthma, kidney disease, metabolic disease (e.g. diabetes), anemia, or other blood disorder?   No   Do you have cancer, leukemia, HIV/AIDS, or any other immune system problem?   No   In the past 3 months, have you taken medications that affect  your immune system, such as prednisone, other steroids, or anticancer drugs; drugs for the treatment of rheumatoid arthritis, Crohn s disease, or psoriasis; or have you had radiation treatments?   No   Have you had a seizure, or a brain or other nervous system problem?   No   During the past year, have you received a transfusion of blood or blood     products, or been given immune (gamma) globulin or antiviral drug?   No   For women: Are you pregnant or is there a chance you could become        pregnant during the next month?   No   Have you received any vaccinations in the past 4 weeks?   Yes     Immunization questionnaire was positive for at least one answer.  Notified Chris Hanson .        Per orders of Dr. Davis, injection of García given by Catarina Jeffery. Patient instructed to remain in clinic for 15 minutes afterwards, and to report any adverse reaction to me immediately.       Screening performed by Catarina Jeffery on 12/28/2018 at 10:13 AM.

## 2019-06-10 ENCOUNTER — TELEPHONE (OUTPATIENT)
Dept: FAMILY MEDICINE | Facility: CLINIC | Age: 59
End: 2019-06-10

## 2019-06-10 DIAGNOSIS — F41.1 GAD (GENERALIZED ANXIETY DISORDER): ICD-10-CM

## 2019-06-10 DIAGNOSIS — Z00.00 ROUTINE GENERAL MEDICAL EXAMINATION AT A HEALTH CARE FACILITY: ICD-10-CM

## 2019-06-10 DIAGNOSIS — F40.10 SOCIAL PHOBIA: ICD-10-CM

## 2019-06-11 NOTE — TELEPHONE ENCOUNTER
Reason for Call:  Other prescription    Detailed comments: The patient is calling to speak with a nurse regarding his alprazolam. He was notified of the 48-72 hour window on all refills. He says he still wants to follow up on this.    Phone Number Patient can be reached at: Home number on file [phone] (home)    Best Time: Anytime    Can we leave a detailed message on this number? YES    Call taken on 6/11/2019 at 12:06 PM by Devika San

## 2019-06-11 NOTE — TELEPHONE ENCOUNTER
ALPRAZolam (XANAX) 0.25 MG tablet     Last Written Prescription Date:  9/6/18  Last Fill Quantity: 15,   # refills: 1  Last Office Visit: 9/6/18  Future Office visit:       Routing refill request to provider for review/approval because:  Drug not on the [facility], [facility] or [facility] refill protocol or controlled substance

## 2019-06-12 RX ORDER — ALPRAZOLAM 0.25 MG
TABLET ORAL
Qty: 15 TABLET | Refills: 0 | Status: SHIPPED | OUTPATIENT
Start: 2019-06-12 | End: 2020-01-02

## 2019-06-12 NOTE — TELEPHONE ENCOUNTER
PT checked with [facility] [place] For his  Refill for Alprozolam.  They have not gotten the fax yet. Pt had called [facility] and someone informed him that it had been faxed.  He really needs this to be filled today. Please Fax over as soon as you can. - thank you.

## 2019-08-03 DIAGNOSIS — Z00.00 ROUTINE GENERAL MEDICAL EXAMINATION AT A HEALTH CARE FACILITY: ICD-10-CM

## 2019-08-03 DIAGNOSIS — E78.5 HYPERLIPIDEMIA LDL GOAL <130: ICD-10-CM

## 2019-08-03 NOTE — LETTER
August 7, 2019      Juni Jain  [address] [place] [address]        Dear Juni,    I care about your health and have reviewed your health plan. I have reviewed your medical conditions, medication list, and lab results and am making recommendations based on this review, to better manage your health.    You are in particular need of attention regarding:  -Wellness (Physical) Visit     I am recommending that you:  -Schedule an appointment    Here is a list of Health Maintenance topics that are due now or due soon:  Health Maintenance Due   Topic Date Due     Depression Assessment  03/06/2019     Preventive Care Visit  09/06/2019       Please call us at [phone] (or use [employer]) to address the above recommendations.     Thank you for trusting [facility] and we appreciate the opportunity to serve you.  We look forward to supporting your healthcare needs in the future.    Healthy Regards,    Ru Davis MD            
PCP for Routine Care

## 2019-08-05 RX ORDER — ATORVASTATIN CALCIUM 40 MG/1
TABLET, FILM COATED ORAL
Qty: 30 TABLET | Refills: 0 | Status: SHIPPED | OUTPATIENT
Start: 2019-08-05 | End: 2019-09-27

## 2019-08-07 NOTE — TELEPHONE ENCOUNTER
"Requested Prescriptions   Pending Prescriptions Disp Refills     atorvastatin (LIPITOR) 40 MG tablet [Pharmacy Med Name: ATORVASTATIN  40MG  TAB] 90 tablet 3     Sig: TAKE 1 TABLET BY MOUTH  DAILY       Statins Protocol Passed - 8/3/2019  3:36 AM        Passed - LDL on file in past 12 months     Recent Labs   Lab Test 09/06/18  0919   *             Passed - No abnormal creatine kinase in past 12 months     No lab results found.             Passed - Recent (12 mo) or future (30 days) visit within the authorizing provider's specialty     Patient had office visit in the last 12 months or has a visit in the next 30 days with authorizing provider or within the authorizing provider's specialty.  See \"Patient Info\" tab in inbasket, or \"Choose Columns\" in Meds & Orders section of the refill encounter.              Passed - Medication is active on med list        Passed - Patient is age 18 or older        atorvastatin (LIPITOR) 40 MG tablet 90 tablet 3 9/6/2018       Last Written Prescription Date:  9/6/2018  Last Fill Quantity: 90,  # refills: 3   Last office visit: 9/6/2018 with prescribing provider:  Dr. Davis   Future Office Visit:  Unknown     "
30 day supply given.  Patient is due for yearly physical and lab work.  Please call and assist with scheduling appointment prior to next refill   Yeimi OBRIEN RN   [facility]      
Azul Saenz contacted Juni on 08/05/19 and left a message. If patient calls back please schedule appointment as soon as possible for a physical and labs.        .Azul HODLEN    [facility] [place]      
Azul Saenz contacted Juni on 08/06/19 and left a message. If patient calls back please schedule appointment as soon as possible for a physical and labs.    .Azul HOLDEN    [facility] [place]      
Azul Saenz contacted Juni on 08/07/19 and left a message. If patient calls back please schedule appointment as soon as possible for a physical and labs, letter mailed.     .Azul HOLDEN    [facility] [place]  
negative

## 2019-09-16 ENCOUNTER — TELEPHONE (OUTPATIENT)
Dept: FAMILY MEDICINE | Facility: CLINIC | Age: 59
End: 2019-09-16

## 2019-09-16 DIAGNOSIS — Z12.11 COLON CANCER SCREENING: Primary | ICD-10-CM

## 2019-09-16 DIAGNOSIS — Z80.0 FAMILY HISTORY OF COLON CANCER: ICD-10-CM

## 2019-09-16 NOTE — TELEPHONE ENCOUNTER
Dr. Davis- Patient is calling to get order to have a colonoscopy done. He says he gets them every 5 yrs. Dx family history of cancer. Patient for many years has had this procedure done by Dr Laura Sparks. Dr Sanchez has now left [facility] and now is at [facility]. Patient insurance does not require a referral and he can go there with his plan. But patient needs an order. Patient last saw Dr. Sanchez in Oct. Of 2018. Notes are in his chart. Please advise. Thanks Pending referral if you approve.    Teresita Cantu  Referral Coordinator

## 2019-09-25 ENCOUNTER — TELEPHONE (OUTPATIENT)
Dept: FAMILY MEDICINE | Facility: CLINIC | Age: 59
End: 2019-09-25

## 2019-09-25 NOTE — TELEPHONE ENCOUNTER
Reason for Call:  Other prior authorization    Detailed comments: Juni called, he needs prior authorization for a CT cardiac calcium scan for his insurance to cover it. Said his brother had one done and it was recommended he get one as well. Said [place] (?) is [id number]. He also needed a referral for one. Gave him Teresita in referral's number. Thank you!    Phone Number Patient can be reached at: Home number on file [phone] (home)    Best Time: any    Can we leave a detailed message on this number? YES    Call taken on 9/25/2019 at 1:26 PM by Kamila Interiano

## 2019-09-26 NOTE — TELEPHONE ENCOUNTER
Patient scheduled 09/27 for a physical.      .Azul HOLDEN    [facility]

## 2019-09-26 NOTE — TELEPHONE ENCOUNTER
Routing to Dr. Davis, patient is requesting a referral for a CT cardiac calcium scan.      .Azul HOLDEN    [facility]

## 2019-09-26 NOTE — TELEPHONE ENCOUNTER
Please advise patient he is due for annual physical exam we can discuss that  and order if necessary.  Office visit to discuss and physical

## 2019-09-27 ENCOUNTER — OFFICE VISIT (OUTPATIENT)
Dept: FAMILY MEDICINE | Facility: CLINIC | Age: 59
End: 2019-09-27
Payer: COMMERCIAL

## 2019-09-27 VITALS
HEART RATE: 76 BPM | WEIGHT: 217 LBS | DIASTOLIC BLOOD PRESSURE: 86 MMHG | SYSTOLIC BLOOD PRESSURE: 120 MMHG | OXYGEN SATURATION: 97 % | TEMPERATURE: 98.6 F | HEIGHT: 72 IN | BODY MASS INDEX: 29.39 KG/M2

## 2019-09-27 DIAGNOSIS — R73.03 PRE-DIABETES: ICD-10-CM

## 2019-09-27 DIAGNOSIS — Z00.00 ROUTINE GENERAL MEDICAL EXAMINATION AT A HEALTH CARE FACILITY: Primary | ICD-10-CM

## 2019-09-27 DIAGNOSIS — Z12.5 SCREENING FOR PROSTATE CANCER: ICD-10-CM

## 2019-09-27 DIAGNOSIS — F40.10 SOCIAL PHOBIA: ICD-10-CM

## 2019-09-27 DIAGNOSIS — Z82.49 FAMILY HISTORY OF ISCHEMIC HEART DISEASE: ICD-10-CM

## 2019-09-27 DIAGNOSIS — G47.33 OSA (OBSTRUCTIVE SLEEP APNEA): ICD-10-CM

## 2019-09-27 DIAGNOSIS — F41.1 GAD (GENERALIZED ANXIETY DISORDER): ICD-10-CM

## 2019-09-27 DIAGNOSIS — Z23 NEED FOR PROPHYLACTIC VACCINATION AND INOCULATION AGAINST INFLUENZA: ICD-10-CM

## 2019-09-27 DIAGNOSIS — R79.89 ELEVATED LIVER FUNCTION TESTS: ICD-10-CM

## 2019-09-27 DIAGNOSIS — E78.5 HYPERLIPIDEMIA LDL GOAL <130: ICD-10-CM

## 2019-09-27 LAB
ALBUMIN SERPL-MCNC: 4.3 G/DL (ref 3.4–5)
ALP SERPL-CCNC: 97 U/L (ref 40–150)
ALT SERPL W P-5'-P-CCNC: 81 U/L (ref 0–70)
ANION GAP SERPL CALCULATED.3IONS-SCNC: 7 MMOL/L (ref 3–14)
AST SERPL W P-5'-P-CCNC: 27 U/L (ref 0–45)
BILIRUB SERPL-MCNC: 0.9 MG/DL (ref 0.2–1.3)
BUN SERPL-MCNC: 19 MG/DL (ref 7–30)
CALCIUM SERPL-MCNC: 9.1 MG/DL (ref 8.5–10.1)
CHLORIDE SERPL-SCNC: 106 MMOL/L (ref 94–109)
CHOLEST SERPL-MCNC: 216 MG/DL
CO2 SERPL-SCNC: 24 MMOL/L (ref 20–32)
CREAT SERPL-MCNC: 1.04 MG/DL (ref 0.66–1.25)
GFR SERPL CREATININE-BSD FRML MDRD: 79 ML/MIN/{1.73_M2}
GLUCOSE SERPL-MCNC: 103 MG/DL (ref 70–99)
HBA1C MFR BLD: 5.4 % (ref 0–5.6)
HDLC SERPL-MCNC: 41 MG/DL
LDLC SERPL CALC-MCNC: 153 MG/DL
NONHDLC SERPL-MCNC: 175 MG/DL
POTASSIUM SERPL-SCNC: 4.3 MMOL/L (ref 3.4–5.3)
PROT SERPL-MCNC: 7.4 G/DL (ref 6.8–8.8)
SODIUM SERPL-SCNC: 137 MMOL/L (ref 133–144)
TRIGL SERPL-MCNC: 110 MG/DL

## 2019-09-27 PROCEDURE — 80061 LIPID PANEL: CPT | Performed by: FAMILY MEDICINE

## 2019-09-27 PROCEDURE — 99396 PREV VISIT EST AGE 40-64: CPT | Mod: 25 | Performed by: FAMILY MEDICINE

## 2019-09-27 PROCEDURE — 90682 RIV4 VACC RECOMBINANT DNA IM: CPT | Performed by: FAMILY MEDICINE

## 2019-09-27 PROCEDURE — 80053 COMPREHEN METABOLIC PANEL: CPT | Performed by: FAMILY MEDICINE

## 2019-09-27 PROCEDURE — G0103 PSA SCREENING: HCPCS | Performed by: FAMILY MEDICINE

## 2019-09-27 PROCEDURE — 99213 OFFICE O/P EST LOW 20 MIN: CPT | Mod: 25 | Performed by: FAMILY MEDICINE

## 2019-09-27 PROCEDURE — 36415 COLL VENOUS BLD VENIPUNCTURE: CPT | Performed by: FAMILY MEDICINE

## 2019-09-27 PROCEDURE — 90471 IMMUNIZATION ADMIN: CPT | Performed by: FAMILY MEDICINE

## 2019-09-27 PROCEDURE — 83036 HEMOGLOBIN GLYCOSYLATED A1C: CPT | Performed by: FAMILY MEDICINE

## 2019-09-27 RX ORDER — VENLAFAXINE HYDROCHLORIDE 150 MG/1
150 CAPSULE, EXTENDED RELEASE ORAL DAILY
Qty: 90 CAPSULE | Refills: 3 | Status: SHIPPED | OUTPATIENT
Start: 2019-09-27 | End: 2020-09-10

## 2019-09-27 RX ORDER — ATORVASTATIN CALCIUM 40 MG/1
40 TABLET, FILM COATED ORAL DAILY
Qty: 90 TABLET | Refills: 3 | Status: SHIPPED | OUTPATIENT
Start: 2019-09-27 | End: 2019-11-25

## 2019-09-27 ASSESSMENT — ANXIETY QUESTIONNAIRES
5. BEING SO RESTLESS THAT IT IS HARD TO SIT STILL: NOT AT ALL
GAD7 TOTAL SCORE: 5
1. FEELING NERVOUS, ANXIOUS, OR ON EDGE: SEVERAL DAYS
7. FEELING AFRAID AS IF SOMETHING AWFUL MIGHT HAPPEN: NOT AT ALL
IF YOU CHECKED OFF ANY PROBLEMS ON THIS QUESTIONNAIRE, HOW DIFFICULT HAVE THESE PROBLEMS MADE IT FOR YOU TO DO YOUR WORK, TAKE CARE OF THINGS AT HOME, OR GET ALONG WITH OTHER PEOPLE: NOT DIFFICULT AT ALL
2. NOT BEING ABLE TO STOP OR CONTROL WORRYING: SEVERAL DAYS
6. BECOMING EASILY ANNOYED OR IRRITABLE: SEVERAL DAYS
3. WORRYING TOO MUCH ABOUT DIFFERENT THINGS: SEVERAL DAYS

## 2019-09-27 ASSESSMENT — MIFFLIN-ST. JEOR: SCORE: 1859.31

## 2019-09-27 ASSESSMENT — PATIENT HEALTH QUESTIONNAIRE - PHQ9
5. POOR APPETITE OR OVEREATING: SEVERAL DAYS
SUM OF ALL RESPONSES TO PHQ QUESTIONS 1-9: 2

## 2019-09-27 NOTE — PROGRESS NOTES
SUBJECTIVE:   CC: Juni Jain is an 58 year old male who presents for preventive health visit.     Healthy Habits:    Do you get at least three servings of calcium containing foods daily (dairy, green leafy vegetables, etc.)? NO    Amount of exercise or daily activities, outside of work: walking daily    Problems taking medications regularly No    Medication side effects: No    Have you had an eye exam in the past two years? yes    Do you see a dentist twice per year? yes    Do you have sleep apnea, excessive snoring or daytime drowsiness?yes      Patient is overall healthy.  He has some issues with anxiety, mostly social phobia.  Effects are on regular basis.  Use propanolol only under higher stress situation.  Family history of coronary artery disease requested CT calcium score which is reasonable.  He does not have any current symptoms  Sleep apnea much improved with the use of new device.  Other medication include cholesterol, he denies any side effects.    Today's PHQ-2 Score:   PHQ-2 ( 1999 Pfizer) 9/27/2019 3/29/2018   Q1: Little interest or pleasure in doing things 0 0   Q2: Feeling down, depressed or hopeless 0 0   PHQ-2 Score 0 0   Q1: Little interest or pleasure in doing things - -   Q2: Feeling down, depressed or hopeless - -   PHQ-2 Score - -       Abuse: Current or Past(Physical, Sexual or Emotional)- No  Do you feel safe in your environment? Yes    Social History     Tobacco Use     Smoking status: Never Smoker     Smokeless tobacco: Never Used     Tobacco comment: Do not smoke   Substance Use Topics     Alcohol use: Yes     Alcohol/week: 0.0 standard drinks     Comment: Weekends     If you drink alcohol do you typically have >3 drinks per day or >7 drinks per week? No                      Last PSA:   PSA   Date Value Ref Range Status   09/06/2018 2.43 0 - 4 ug/L Final     Comment:     Assay Method:  Chemiluminescence using Siemens Vista analyzer       Reviewed orders with patient. Reviewed  "health maintenance and updated orders accordingly - Yes  Lab work is in process    Reviewed and updated as needed this visit by clinical staff  Tobacco  Allergies  Meds         Reviewed and updated as needed this visit by Provider            ROS:  CONSTITUTIONAL: NEGATIVE for fever, chills, change in weight  INTEGUMENTARY/SKIN: NEGATIVE for worrisome rashes, moles or lesions  EYES: NEGATIVE for vision changes or irritation  ENT: NEGATIVE for ear, mouth and throat problems  RESP: NEGATIVE for significant cough or SOB  CV: NEGATIVE for chest pain, palpitations or peripheral edema  GI: NEGATIVE for nausea, abdominal pain, heartburn, or change in bowel habits   male: negative for dysuria, hematuria, decreased urinary stream, erectile dysfunction, urethral discharge  MUSCULOSKELETAL: NEGATIVE for significant arthralgias or myalgia  NEURO: NEGATIVE for weakness, dizziness or paresthesias  PSYCHIATRIC: NEGATIVE for changes in mood or affect    OBJECTIVE:   Temp 98.6  F (37  C) (Tympanic)   Ht 1.84 m (6' 0.44\")   Wt 98.4 kg (217 lb)   BMI 29.07 kg/m    EXAM:  GENERAL: healthy, alert and no distress  EYES: Eyes grossly normal to inspection, PERRL and conjunctivae and sclerae normal  HENT: ear canals and TM's normal, nose and mouth without ulcers or lesions  NECK: no adenopathy, no asymmetry, masses, or scars and thyroid normal to palpation  RESP: lungs clear to auscultation - no rales, rhonchi or wheezes  CV: regular rate and rhythm, normal S1 S2, no S3 or S4, no murmur, click or rub, no peripheral edema and peripheral pulses strong  ABDOMEN: soft, nontender, no hepatosplenomegaly, no masses and bowel sounds normal  MS: no gross musculoskeletal defects noted, no edema  SKIN: no suspicious lesions or rashes  NEURO: Normal strength and tone, mentation intact and speech normal  PSYCH: mentation appears normal, affect normal/bright    Diagnostic Test Results:  Labs reviewed in Epic    ASSESSMENT/PLAN:   1. Routine " "general medical examination at a health care facility    - INFLUENZA QUAD, RECOMBINANT, P-FREE (RIV4) (FLUBLOCK) [90682]  - Vaccine Administration, Initial [90471]  - Comprehensive metabolic panel  - Lipid panel reflex to direct LDL Fasting  - Prostate spec antigen screen    2. Hyperlipidemia LDL goal <130    - atorvastatin (LIPITOR) 40 MG tablet; Take 1 tablet (40 mg) by mouth daily  Dispense: 90 tablet; Refill: 3  - Comprehensive metabolic panel  - Lipid panel reflex to direct LDL Fasting    3. Social phobia    - venlafaxine (EFFEXOR-XR) 150 MG 24 hr capsule; Take 1 capsule (150 mg) by mouth daily  Dispense: 90 capsule; Refill: 3    4. Need for prophylactic vaccination and inoculation against influenza    - INFLUENZA QUAD, RECOMBINANT, P-FREE (RIV4) (FLUBLOCK) [90682]  - Vaccine Administration, Initial [90471]    5. REY (obstructive sleep apnea)      6. ELVIS (generalized anxiety disorder)  Deviation refill stable.  - venlafaxine (EFFEXOR-XR) 150 MG 24 hr capsule; Take 1 capsule (150 mg) by mouth daily  Dispense: 90 capsule; Refill: 3    7. Screening for prostate cancer    - Prostate spec antigen screen    8. Family history of ischemic heart disease  Patient has history of coronary artery disease he does not have any side effects he would like to get a CT calcium score which is reasonable which is ordered.  - CT Coronary Calcium Scan; Future    9. Pre-diabetes  Blood sugar has been on the slight upper side but not in diabetic range.  Will check hemoglobin A1c and follow-up on that  - Hemoglobin A1c    COUNSELING:  Reviewed preventive health counseling, as reflected in patient instructions       Regular exercise       Healthy diet/nutrition    Estimated body mass index is 29.07 kg/m  as calculated from the following:    Height as of this encounter: 1.84 m (6' 0.44\").    Weight as of this encounter: 98.4 kg (217 lb).         reports that he has never smoked. He has never used smokeless tobacco.      Counseling " Resources:  ATP IV Guidelines  Pooled Cohorts Equation Calculator  FRAX Risk Assessment  ICSI Preventive Guidelines  Dietary Guidelines for Americans, 2010  [facility]'s MyPlate  ASA Prophylaxis  Lung CA Screening    Ru Davis MD  [facility]

## 2019-09-28 LAB — PSA SERPL-ACNC: 1.43 UG/L (ref 0–4)

## 2019-09-28 ASSESSMENT — ANXIETY QUESTIONNAIRES: GAD7 TOTAL SCORE: 5

## 2019-09-29 ENCOUNTER — MYC MEDICAL ADVICE (OUTPATIENT)
Dept: FAMILY MEDICINE | Facility: CLINIC | Age: 59
End: 2019-09-29

## 2019-09-30 NOTE — TELEPHONE ENCOUNTER
Please see [employer] message and advise.      Thank you,  Paty ANAYARN BSN  [facility]  [phone]

## 2019-10-01 DIAGNOSIS — R79.89 ELEVATED LIVER FUNCTION TESTS: ICD-10-CM

## 2019-10-01 PROCEDURE — 36415 COLL VENOUS BLD VENIPUNCTURE: CPT | Performed by: FAMILY MEDICINE

## 2019-10-01 PROCEDURE — 87340 HEPATITIS B SURFACE AG IA: CPT | Performed by: FAMILY MEDICINE

## 2019-10-01 PROCEDURE — 86706 HEP B SURFACE ANTIBODY: CPT | Performed by: FAMILY MEDICINE

## 2019-10-01 PROCEDURE — 84443 ASSAY THYROID STIM HORMONE: CPT | Performed by: FAMILY MEDICINE

## 2019-10-01 PROCEDURE — 86803 HEPATITIS C AB TEST: CPT | Performed by: FAMILY MEDICINE

## 2019-10-02 LAB
HBV SURFACE AB SERPL IA-ACNC: 140.9 M[IU]/ML
HBV SURFACE AG SERPL QL IA: NONREACTIVE
HCV AB SERPL QL IA: NONREACTIVE
TSH SERPL DL<=0.005 MIU/L-ACNC: 3.26 MU/L (ref 0.4–4)

## 2019-10-14 ENCOUNTER — ALLIED HEALTH/NURSE VISIT (OUTPATIENT)
Dept: NURSING | Facility: CLINIC | Age: 59
End: 2019-10-14
Payer: COMMERCIAL

## 2019-10-14 DIAGNOSIS — Z23 NEED FOR VACCINATION: Primary | ICD-10-CM

## 2019-10-14 PROCEDURE — 99207 ZZC NO CHARGE LOS: CPT

## 2019-10-14 PROCEDURE — 90471 IMMUNIZATION ADMIN: CPT

## 2019-10-14 PROCEDURE — 90632 HEPA VACCINE ADULT IM: CPT

## 2019-10-21 ENCOUNTER — TELEPHONE (OUTPATIENT)
Dept: FAMILY MEDICINE | Facility: CLINIC | Age: 59
End: 2019-10-21

## 2019-10-21 NOTE — TELEPHONE ENCOUNTER
Patient states that he is having  Some shortness of breath with exertion. Dr. Davis needs to tell insurance that the patient has been having shortness of breath for insurance to cover CT calcium. This was not documented in the 9/27 OV note.     Patient is scheduled this Wednesday afternoon at 3 pm  for CT calcium. Just got the letter from insurance today.     Number for doctor to call to discuss [phone]  Reference no [id number]  Insurance member ID:  [id number].    Please call the patient back when taken care of. He will need to reschedule his CT calcium if not resolved before then.   Terrie Hudson RN

## 2019-10-21 NOTE — TELEPHONE ENCOUNTER
Reason for Call:  Other CT     Detailed comments: Juni was ordered a CT of his heart. Insurance denied the request. States needs more information regarding reason for the CT scan.     Phone Number Patient can be reached at: Home number on file [phone] (home)    Best Time: anytime     Can we leave a detailed message on this number? YES    Call taken on 10/21/2019 at 12:01 PM by Samantha Grajeda

## 2019-10-21 NOTE — TELEPHONE ENCOUNTER
Attempted to call patient back to gather more information. Left a non-detailed message and call back number [phone].     There is a future order for CT Calcium Screening that was ordered by Dr. Davis, reason: Family history of ischemic heart disease.    Patient is calling stated in note below that insurance denied the request, and stated they need more information as to the reason why patient needs CT calcium scan.     Called patient to verify if he needs a prior authorization initiated for this?     Trish Gallego RN, BSN  [facility]

## 2019-10-22 NOTE — TELEPHONE ENCOUNTER
I spoke with patient and informed of below. He is not interested in coming in for an OV to discuss other testing at this point. I explained the difference between the calcium score and a stress test. Patient states his brother had a calcium score done and it was beneficial. He got the letter from the insurance company and he spoke with them and they state the only thing missing for them to cover the test are symptoms which he has. I again explained this test would not be beneficial for acute sxs and that there is more appropriate testing.     The patient wants to have this scan done and is wondering if this can be reordered with the sxs dyspnea on exertion so his insurance will cover. Please advise if willing to reorder.     Mercedes Mcgregor RN   [facility] - Triage

## 2019-10-22 NOTE — TELEPHONE ENCOUNTER
Please call and notify the patient CT calcium score is not used for symptoms like dyspnea on exertion.  CT calcium score is reasonable for people who have some family history  And may not have nay acute symptms and it may or may not be covered by the insurance.  If he has some difficulty breathing on exertion he should follow-up in the clinic and we will determine what type of stress test if needed.

## 2019-10-22 NOTE — TELEPHONE ENCOUNTER
Spoke with the patient personally and again told him what would be appropriate test if he has any symptoms.  He was advised if he still willing to get that test done he should contact the [employer] billing and see what would be the cost of the test if paying out-of-pocket.  If he has any symptoms of difficulty breathing I suggested him to follow-up in the clinic so that we can determine.  He understand that and he will follow-up accordingly.

## 2019-10-22 NOTE — TELEPHONE ENCOUNTER
Since Dr. Davis spoke with patient via telephone. Closing encounter at this time.     Trish Gallego RN, BSN  [facility]

## 2019-11-12 ENCOUNTER — HOSPITAL ENCOUNTER (OUTPATIENT)
Dept: CT IMAGING | Facility: CLINIC | Age: 59
Discharge: HOME | End: 2019-11-12
Attending: FAMILY MEDICINE | Admitting: FAMILY MEDICINE
Payer: COMMERCIAL

## 2019-11-12 DIAGNOSIS — Z82.49 FAMILY HISTORY OF ISCHEMIC HEART DISEASE: ICD-10-CM

## 2019-11-12 PROCEDURE — 75571 CT HRT W/O DYE W/CA TEST: CPT

## 2019-11-12 PROCEDURE — 75571 CT HRT W/O DYE W/CA TEST: CPT | Mod: 26 | Performed by: INTERNAL MEDICINE

## 2019-11-13 DIAGNOSIS — I25.10 CORONARY ARTERY CALCIFICATION SEEN ON CT SCAN: Primary | ICD-10-CM

## 2019-11-13 DIAGNOSIS — Z82.49 FAMILY HISTORY OF HEART DISEASE: ICD-10-CM

## 2019-11-25 ENCOUNTER — OFFICE VISIT (OUTPATIENT)
Dept: CARDIOLOGY | Facility: CLINIC | Age: 59
End: 2019-11-25
Attending: FAMILY MEDICINE
Payer: COMMERCIAL

## 2019-11-25 VITALS
WEIGHT: 226 LBS | HEIGHT: 73 IN | BODY MASS INDEX: 29.95 KG/M2 | HEART RATE: 73 BPM | DIASTOLIC BLOOD PRESSURE: 92 MMHG | SYSTOLIC BLOOD PRESSURE: 140 MMHG

## 2019-11-25 DIAGNOSIS — R07.89 ATYPICAL CHEST PAIN: Primary | ICD-10-CM

## 2019-11-25 DIAGNOSIS — E78.5 HYPERLIPIDEMIA LDL GOAL <130: ICD-10-CM

## 2019-11-25 PROCEDURE — 99204 OFFICE O/P NEW MOD 45 MIN: CPT | Performed by: INTERNAL MEDICINE

## 2019-11-25 RX ORDER — ASPIRIN 81 MG/1
81 TABLET ORAL DAILY
COMMUNITY
End: 2020-09-29

## 2019-11-25 RX ORDER — ATORVASTATIN CALCIUM 80 MG/1
80 TABLET, FILM COATED ORAL DAILY
Qty: 90 TABLET | Refills: 3 | Status: SHIPPED | OUTPATIENT
Start: 2019-11-25 | End: 2019-12-23

## 2019-11-25 ASSESSMENT — MIFFLIN-ST. JEOR: SCORE: 1904.01

## 2019-11-25 NOTE — PROGRESS NOTES
HPI and Plan:   See dictation    Orders Placed This Encounter   Procedures     ALT     AST     Exercise Stress Echocardiogram       Orders Placed This Encounter   Medications     aspirin 81 MG EC tablet     Sig: Take 81 mg by mouth daily     atorvastatin (LIPITOR) 80 MG tablet     Sig: Take 1 tablet (80 mg) by mouth daily     Dispense:  90 tablet     Refill:  3       Medications Discontinued During This Encounter   Medication Reason     atorvastatin (LIPITOR) 40 MG tablet Reorder         Encounter Diagnoses   Name Primary?     Atypical chest pain Yes     Hyperlipidemia LDL goal <130        CURRENT MEDICATIONS:  Current Outpatient Medications   Medication Sig Dispense Refill     ALPRAZolam (XANAX) 0.25 MG tablet TAKE AS DIRECTED FOR ANXIETY PRIOR TO PRESENTATION AND AS NEEDED FOR ANXIETY 15 tablet 0     aspirin 81 MG EC tablet Take 81 mg by mouth daily       atorvastatin (LIPITOR) 80 MG tablet Take 1 tablet (80 mg) by mouth daily 90 tablet 3     Esomeprazole Magnesium (NEXIUM PO) Take 40 mg by mouth every morning (before breakfast)       Omega-3 Fatty Acids (FISH OIL) 1200 MG capsule Take 2 capsules by mouth daily. 180 capsule 12     propranolol (INDERAL) 10 MG tablet TAKE 1-2 TABLETS BY MOUTH 90 MINUTES BEFORE PRESENTATION 15 tablet 3     venlafaxine (EFFEXOR-XR) 150 MG 24 hr capsule Take 1 capsule (150 mg) by mouth daily 90 capsule 3     VITAMIN D, CHOLECALCIFEROL, PO Take 2-4 tablets by mouth daily.       mupirocin (BACTROBAN) 2 % ointment          ALLERGIES     Allergies   Allergen Reactions     No Known Drug Allergies        PAST MEDICAL HISTORY:  Past Medical History:   Diagnosis Date     Coronary artery disease April 2013    some angina here and there. April 2013 at [facility]     ELVIS (generalized anxiety disorder) 5/13/2016     Gastroesophageal reflux disease     somewhat but stopped taking Nexium     Hyperlipidemia LDL goal <130 10/31/2010     Obstructive sleep apnea March 2, 2017    most recent  sleep study from [facility], No CPAP     Sleep apnea 1994    Chronic issue for over 15+ years       PAST SURGICAL HISTORY:  Past Surgical History:   Procedure Laterality Date     COLONOSCOPY  2013    Going June 9, 2016     COSMETIC SURGERY  1981    Rhinoplasty/chin implant-Deviated Septum     ENDOSCOPY DRUG INDUCED SLEEP Bilateral 7/19/2017    Procedure: ENDOSCOPY DRUG INDUCED SLEEP;  Drug Induced Sleep Endoscopy;  Surgeon: Liliana Paige MD;  [facility]     ENT SURGERY  over the years    CPAP never worked for me. Uncomfortable.     IMPLANT GENERATOR STIMULATOR (LOCATION) Right 9/6/2017    Procedure: IMPLANT GENERATOR STIMULATOR (LOCATION);  Right Hypoglossal Nerve (Inspire) Implantation with Facial Nerve Monitoring;  Surgeon: Liliana Paige MD;  [facility] OR       FAMILY HISTORY:  Family History   Problem Relation Age of Onset     Alzheimer Disease Mother      Dementia Mother         Dementia     C.A.D. Father 86        Bypass 5/2011     Cancer Father         Esophageal cancer     Hypertension Father      Thyroid Disease Brother      Thyroid Disease Brother         Hypo. Under     Cancer Maternal Grandmother         Stomach Cancer     Stomach Problem Maternal Grandmother         colon/stomach cancer     Cancer Maternal Grandfather         Lung cancer     Thyroid Disease Brother         ,     Cancer Paternal Grandfather         Prostrate cancer       SOCIAL HISTORY:  Social History     Socioeconomic History     Marital status:      Spouse name: sera saldivar     Number of children: 3     Years of education: 16     Highest education level: None   Occupational History     Occupation: sales     Employer: NONE    Social Needs     Financial resource strain: None     Food insecurity:     Worry: None     Inability: None     Transportation needs:     Medical: None     Non-medical: None   Tobacco Use     Smoking status: Never Smoker     Smokeless tobacco: Never Used     Tobacco  "comment: Do not smoke   Substance and Sexual Activity     Alcohol use: Yes     Alcohol/week: 0.0 standard drinks     Comment: Weekends     Drug use: No     Sexual activity: Yes     Partners: Female     Birth control/protection: Male Surgical     Comment: Vasectomy   Lifestyle     Physical activity:     Days per week: None     Minutes per session: None     Stress: None   Relationships     Social connections:     Talks on phone: None     Gets together: None     Attends Latter day service: None     Active member of club or organization: None     Attends meetings of clubs or organizations: None     Relationship status: None     Intimate partner violence:     Fear of current or ex partner: None     Emotionally abused: None     Physically abused: None     Forced sexual activity: None   Other Topics Concern      Service No     Blood Transfusions No     Caffeine Concern No     Occupational Exposure No     Hobby Hazards No     Sleep Concern No     Stress Concern Yes     Weight Concern Yes     Special Diet Yes     Comment: wt watchers     Back Care No     Exercise Yes     Bike Helmet No     Seat Belt Yes     Self-Exams No     Parent/sibling w/ CABG, MI or angioplasty before 65F 55M? No   Social History Narrative     None       Review of Systems:  Skin:  Negative       Eyes:  Negative      ENT:  Negative      Respiratory:  Positive for dyspnea on exertion;sleep apnea     Cardiovascular:    Positive for;chest pain;fatigue    Gastroenterology: Negative reflux    Genitourinary:  Negative      Musculoskeletal:  Negative      Neurologic:  Negative      Psychiatric:  Negative      Heme/Lymph/Imm:  Negative      Endocrine:  Negative        Physical Exam:  Vitals: BP (!) 140/92   Pulse 73   Ht 1.854 m (6' 1\")   Wt 102.5 kg (226 lb)   BMI 29.82 kg/m      Constitutional:  cooperative        Skin:  warm and dry to the touch          Head:  normocephalic        Eyes:  conjunctivae and lids unremarkable        Lymph:      ENT:  " no pallor or cyanosis        Neck:  carotid pulses are full and equal bilaterally;JVP normal        Respiratory:  clear to auscultation         Cardiac: regular rhythm;no murmurs, gallops or rubs detected                pulses full and equal, no bruits auscultated                                        GI:  abdomen soft, non-tender, BS normoactive, no mass, no HSM, no bruits;abdomen soft        Extremities and Muscular Skeletal:  no deformities, clubbing, cyanosis, erythema observed              Neurological:  no gross motor deficits        Psych:  Alert and Oriented x 3        CC  Ru Davis MD  [address] [place] [address]

## 2019-11-25 NOTE — LETTER
11/25/2019    Ru Davis MD  [address]  [place] [address]    RE: Juni Jain       Dear Colleague,    I had the pleasure of seeing Juni Jain in the [facility].    HPI and Plan:   See dictation    Orders Placed This Encounter   Procedures     ALT     AST     Exercise Stress Echocardiogram       Orders Placed This Encounter   Medications     aspirin 81 MG EC tablet     Sig: Take 81 mg by mouth daily     atorvastatin (LIPITOR) 80 MG tablet     Sig: Take 1 tablet (80 mg) by mouth daily     Dispense:  90 tablet     Refill:  3       Medications Discontinued During This Encounter   Medication Reason     atorvastatin (LIPITOR) 40 MG tablet Reorder         Encounter Diagnoses   Name Primary?     Atypical chest pain Yes     Hyperlipidemia LDL goal <130        CURRENT MEDICATIONS:  Current Outpatient Medications   Medication Sig Dispense Refill     ALPRAZolam (XANAX) 0.25 MG tablet TAKE AS DIRECTED FOR ANXIETY PRIOR TO PRESENTATION AND AS NEEDED FOR ANXIETY 15 tablet 0     aspirin 81 MG EC tablet Take 81 mg by mouth daily       atorvastatin (LIPITOR) 80 MG tablet Take 1 tablet (80 mg) by mouth daily 90 tablet 3     Esomeprazole Magnesium (NEXIUM PO) Take 40 mg by mouth every morning (before breakfast)       Omega-3 Fatty Acids (FISH OIL) 1200 MG capsule Take 2 capsules by mouth daily. 180 capsule 12     propranolol (INDERAL) 10 MG tablet TAKE 1-2 TABLETS BY MOUTH 90 MINUTES BEFORE PRESENTATION 15 tablet 3     venlafaxine (EFFEXOR-XR) 150 MG 24 hr capsule Take 1 capsule (150 mg) by mouth daily 90 capsule 3     VITAMIN D, CHOLECALCIFEROL, PO Take 2-4 tablets by mouth daily.       mupirocin (BACTROBAN) 2 % ointment          ALLERGIES     Allergies   Allergen Reactions     No Known Drug Allergies        PAST MEDICAL HISTORY:  Past Medical History:   Diagnosis Date     Coronary artery disease April 2013    some angina here and there. April 2013 at [facility]      ELVIS (generalized anxiety disorder) 5/13/2016     Gastroesophageal reflux disease     somewhat but stopped taking Nexium     Hyperlipidemia LDL goal <130 10/31/2010     Obstructive sleep apnea March 2, 2017    most recent sleep study from [facility], No CPAP     Sleep apnea 1994    Chronic issue for over 15+ years       PAST SURGICAL HISTORY:  Past Surgical History:   Procedure Laterality Date     COLONOSCOPY  2013    Going June 9, 2016     COSMETIC SURGERY  1981    Rhinoplasty/chin implant-Deviated Septum     ENDOSCOPY DRUG INDUCED SLEEP Bilateral 7/19/2017    Procedure: ENDOSCOPY DRUG INDUCED SLEEP;  Drug Induced Sleep Endoscopy;  Surgeon: Liliana Paige MD;  [facility]     ENT SURGERY  over the years    CPAP never worked for me. Uncomfortable.     IMPLANT GENERATOR STIMULATOR (LOCATION) Right 9/6/2017    Procedure: IMPLANT GENERATOR STIMULATOR (LOCATION);  Right Hypoglossal Nerve (Inspire) Implantation with Facial Nerve Monitoring;  Surgeon: Liliana Paige MD;  Location:  OR       FAMILY HISTORY:  Family History   Problem Relation Age of Onset     Alzheimer Disease Mother      Dementia Mother         Dementia     C.A.D. Father 86        Bypass 5/2011     Cancer Father         Esophageal cancer     Hypertension Father      Thyroid Disease Brother      Thyroid Disease Brother         Hypo. Under     Cancer Maternal Grandmother         Stomach Cancer     Stomach Problem Maternal Grandmother         colon/stomach cancer     Cancer Maternal Grandfather         Lung cancer     Thyroid Disease Brother         ,     Cancer Paternal Grandfather         Prostrate cancer       SOCIAL HISTORY:  Social History     Socioeconomic History     Marital status:      Spouse name: sera saldivar     Number of children: 3     Years of education: 16     Highest education level: None   Occupational History     Occupation: sales     Employer: NONE    Social Needs     Financial resource strain: None  "    Food insecurity:     Worry: None     Inability: None     Transportation needs:     Medical: None     Non-medical: None   Tobacco Use     Smoking status: Never Smoker     Smokeless tobacco: Never Used     Tobacco comment: Do not smoke   Substance and Sexual Activity     Alcohol use: Yes     Alcohol/week: 0.0 standard drinks     Comment: Weekends     Drug use: No     Sexual activity: Yes     Partners: Female     Birth control/protection: Male Surgical     Comment: Vasectomy   Lifestyle     Physical activity:     Days per week: None     Minutes per session: None     Stress: None   Relationships     Social connections:     Talks on phone: None     Gets together: None     Attends Mosque service: None     Active member of club or organization: None     Attends meetings of clubs or organizations: None     Relationship status: None     Intimate partner violence:     Fear of current or ex partner: None     Emotionally abused: None     Physically abused: None     Forced sexual activity: None   Other Topics Concern      Service No     Blood Transfusions No     Caffeine Concern No     Occupational Exposure No     Hobby Hazards No     Sleep Concern No     Stress Concern Yes     Weight Concern Yes     Special Diet Yes     Comment: wt watchers     Back Care No     Exercise Yes     Bike Helmet No     Seat Belt Yes     Self-Exams No     Parent/sibling w/ CABG, MI or angioplasty before 65F 55M? No   Social History Narrative     None       Review of Systems:  Skin:  Negative       Eyes:  Negative      ENT:  Negative      Respiratory:  Positive for dyspnea on exertion;sleep apnea     Cardiovascular:    Positive for;chest pain;fatigue    Gastroenterology: Negative reflux    Genitourinary:  Negative      Musculoskeletal:  Negative      Neurologic:  Negative      Psychiatric:  Negative      Heme/Lymph/Imm:  Negative      Endocrine:  Negative        Physical Exam:  Vitals: BP (!) 140/92   Pulse 73   Ht 1.854 m (6' 1\")   " Wt 102.5 kg (226 lb)   BMI 29.82 kg/m       Constitutional:  cooperative        Skin:  warm and dry to the touch          Head:  normocephalic        Eyes:  conjunctivae and lids unremarkable        Lymph:      ENT:  no pallor or cyanosis        Neck:  carotid pulses are full and equal bilaterally;JVP normal        Respiratory:  clear to auscultation         Cardiac: regular rhythm;no murmurs, gallops or rubs detected                pulses full and equal, no bruits auscultated                                        GI:  abdomen soft, non-tender, BS normoactive, no mass, no HSM, no bruits;abdomen soft        Extremities and Muscular Skeletal:  no deformities, clubbing, cyanosis, erythema observed              Neurological:  no gross motor deficits        Psych:  Alert and Oriented x 3        CC  Ru Davis MD  [address] [place] [address] [place] [address]                Thank you for allowing me to participate in the care of your patient.      Sincerely,     Wes Stephenson MD, MD     [facility]    cc:   Ru Davis MD  [address] [place] [address] [place] [address]

## 2019-11-25 NOTE — LETTER
11/25/2019      Ru Davis MD  [address]  [place] [address]      RE: Juni Jain       Dear Colleague,    I had the pleasure of seeing Juni Jain in the [facility].    Service Date: 11/25/2019      REASON FOR CONSULTATION:  Atypical chest pain and coronary artery disease.      REFERRING PHYSICIAN:  Dr. Ru Davis.      HISTORY OF PRESENT ILLNESS:  I had the pleasure of seeing Juni Jain at the [facility] in [place] this morning.  As you know, he is a very pleasant 59-year-old gentleman with hypertension, hyperlipidemia, obstructive sleep apnea and family history of premature coronary artery disease, who was recently diagnosed with coronary artery disease in the form of CT calcium score.      He has longstanding history of hyperlipidemia, treated with atorvastatin.  He recently had a CT coronary calcium score.  This revealed no calcium in his left main, but calcium in his LAD, circumflex and RCA.  Overall, his total calcium score was 396, which placed him at the 94th percentile when compared to age and gender-matched control group.      Mr. Jain is pretty active and exercises regularly.  Lately, he has been noticing left-sided chest discomfort which he sometimes notices after exercise.  He also notices it sometimes at rest when he wakes up in the morning.  No palpitations, presyncope or syncope.      IMPRESSION/PLAN:   1.  Atypical chest pain.   2.  Coronary artery disease based on recent CT calcium score.   3.  Hypertension.   4.  Hyperlipidemia.      His chest discomfort is quite atypical for ischemic pain.  However, given anxiety surrounding his symptoms and the fact that he has significant family history of premature coronary artery disease, we will perform a stress test to rule out significant underlying ischemia.      His risk factors are reasonably well-controlled with the exception of his hyperlipidemia.  His  most recent LDL from September was 153.  I recommended that we increase his atorvastatin from 40 to 80 mg daily.  We will recheck another lipid profile in approximately 3 months.      We will contact him once the results of the stress test become available.  If this stress test is negative, then he will continue to see you on a yearly basis for aggressive risk factor control.      I appreciate the opportunity to be part of this pleasant patient's care.      cc:   Ru Davis MD   [address] [address]         MARY RIVERA MD             D: 2019   T: 2019   MT: al      Name:     JACQUE PARNELL   MRN:      [id number]        Account:      [id number]   :      1960           Service Date: 2019      Document: [id number]         Outpatient Encounter Medications as of 2019   Medication Sig Dispense Refill     ALPRAZolam (XANAX) 0.25 MG tablet TAKE AS DIRECTED FOR ANXIETY PRIOR TO PRESENTATION AND AS NEEDED FOR ANXIETY 15 tablet 0     aspirin 81 MG EC tablet Take 81 mg by mouth daily       atorvastatin (LIPITOR) 80 MG tablet Take 1 tablet (80 mg) by mouth daily 90 tablet 3     Esomeprazole Magnesium (NEXIUM PO) Take 40 mg by mouth every morning (before breakfast)       Omega-3 Fatty Acids (FISH OIL) 1200 MG capsule Take 2 capsules by mouth daily. 180 capsule 12     propranolol (INDERAL) 10 MG tablet TAKE 1-2 TABLETS BY MOUTH 90 MINUTES BEFORE PRESENTATION 15 tablet 3     venlafaxine (EFFEXOR-XR) 150 MG 24 hr capsule Take 1 capsule (150 mg) by mouth daily 90 capsule 3     VITAMIN D, CHOLECALCIFEROL, PO Take 2-4 tablets by mouth daily.       mupirocin (BACTROBAN) 2 % ointment        [DISCONTINUED] atorvastatin (LIPITOR) 40 MG tablet Take 1 tablet (40 mg) by mouth daily 90 tablet 3     No facility-administered encounter medications on file as of 2019.        Again, thank you for allowing me to participate in the care of  your patient.      Sincerely,    Wes Stephenson MD, MD     [facility]

## 2019-11-26 NOTE — PROGRESS NOTES
Service Date: 11/25/2019      REASON FOR CONSULTATION:  Atypical chest pain and coronary artery disease.      REFERRING PHYSICIAN:  Dr. Ru Davis.      HISTORY OF PRESENT ILLNESS:  I had the pleasure of seeing Juni Jain at the [facility] in [place] this morning.  As you know, he is a very pleasant 59-year-old gentleman with hypertension, hyperlipidemia, obstructive sleep apnea and family history of premature coronary artery disease, who was recently diagnosed with coronary artery disease in the form of CT calcium score.      He has longstanding history of hyperlipidemia, treated with atorvastatin.  He recently had a CT coronary calcium score.  This revealed no calcium in his left main, but calcium in his LAD, circumflex and RCA.  Overall, his total calcium score was 396, which placed him at the 94th percentile when compared to age and gender-matched control group.      Mr. Jain is pretty active and exercises regularly.  Lately, he has been noticing left-sided chest discomfort which he sometimes notices after exercise.  He also notices it sometimes at rest when he wakes up in the morning.  No palpitations, presyncope or syncope.      IMPRESSION/PLAN:   1.  Atypical chest pain.   2.  Coronary artery disease based on recent CT calcium score.   3.  Hypertension.   4.  Hyperlipidemia.      His chest discomfort is quite atypical for ischemic pain.  However, given anxiety surrounding his symptoms and the fact that he has significant family history of premature coronary artery disease, we will perform a stress test to rule out significant underlying ischemia.      His risk factors are reasonably well-controlled with the exception of his hyperlipidemia.  His most recent LDL from September was 153.  I recommended that we increase his atorvastatin from 40 to 80 mg daily.  We will recheck another lipid profile in approximately 3 months.      We will contact him once the results of the  stress test become available.  If this stress test is negative, then he will continue to see you on a yearly basis for aggressive risk factor control.      I appreciate the opportunity to be part of this pleasant patient's care.      cc:   Ru Davis MD   [address] [address]         MARY RIVERA MD             D: 2019   T: 2019   MT: al      Name:     JACQUE PARNELL   MRN:      [id number]        Account:      [id number]   :      1960           Service Date: 2019      Document: [id number]

## 2019-12-09 ENCOUNTER — HOSPITAL ENCOUNTER (OUTPATIENT)
Dept: CARDIOLOGY | Facility: CLINIC | Age: 59
Discharge: HOME | End: 2019-12-09
Attending: INTERNAL MEDICINE | Admitting: INTERNAL MEDICINE
Payer: COMMERCIAL

## 2019-12-09 ENCOUNTER — CARE COORDINATION (OUTPATIENT)
Dept: CARDIOLOGY | Facility: CLINIC | Age: 59
End: 2019-12-09

## 2019-12-09 DIAGNOSIS — R07.89 ATYPICAL CHEST PAIN: Primary | ICD-10-CM

## 2019-12-09 DIAGNOSIS — R07.89 ATYPICAL CHEST PAIN: ICD-10-CM

## 2019-12-09 PROCEDURE — 93016 CV STRESS TEST SUPVJ ONLY: CPT | Performed by: INTERNAL MEDICINE

## 2019-12-09 PROCEDURE — 93018 CV STRESS TEST I&R ONLY: CPT | Performed by: INTERNAL MEDICINE

## 2019-12-09 PROCEDURE — 93017 CV STRESS TEST TRACING ONLY: CPT

## 2019-12-09 NOTE — PROGRESS NOTES
Received a call from Fritz stating that his insurance will not cover the stress echo that was ordered by Dr Stephenson. Called the insurance and they said that they will cover an exercise stress test only not an echo stress test. Spoke with Dr Stephenson and he is ok with changing the test to an exercise stress with no echo images. Placed the order and echo is aware of the change and so is the patient.Sherman Lopez RN

## 2019-12-13 ENCOUNTER — TELEPHONE (OUTPATIENT)
Dept: CARDIOLOGY | Facility: CLINIC | Age: 59
End: 2019-12-13

## 2019-12-16 DIAGNOSIS — E78.5 HYPERLIPIDEMIA LDL GOAL <130: Primary | ICD-10-CM

## 2019-12-23 ENCOUNTER — TRANSFERRED RECORDS (OUTPATIENT)
Dept: HEALTH INFORMATION MANAGEMENT | Facility: CLINIC | Age: 59
End: 2019-12-23

## 2019-12-23 DIAGNOSIS — E78.5 HYPERLIPIDEMIA LDL GOAL <130: ICD-10-CM

## 2019-12-23 RX ORDER — ATORVASTATIN CALCIUM 80 MG/1
80 TABLET, FILM COATED ORAL DAILY
Qty: 90 TABLET | Refills: 2 | Status: SHIPPED | OUTPATIENT
Start: 2019-12-23 | End: 2020-01-03

## 2019-12-31 DIAGNOSIS — E78.5 HYPERLIPIDEMIA LDL GOAL <130: ICD-10-CM

## 2019-12-31 LAB
ALT SERPL W P-5'-P-CCNC: 109 U/L (ref 0–70)
AST SERPL W P-5'-P-CCNC: 34 U/L (ref 0–45)
CHOLEST SERPL-MCNC: 223 MG/DL
HDLC SERPL-MCNC: 44 MG/DL
LDLC SERPL CALC-MCNC: 135 MG/DL
NONHDLC SERPL-MCNC: 179 MG/DL
TRIGL SERPL-MCNC: 218 MG/DL

## 2019-12-31 PROCEDURE — 80061 LIPID PANEL: CPT | Performed by: INTERNAL MEDICINE

## 2019-12-31 PROCEDURE — 36415 COLL VENOUS BLD VENIPUNCTURE: CPT | Performed by: INTERNAL MEDICINE

## 2019-12-31 PROCEDURE — 84460 ALANINE AMINO (ALT) (SGPT): CPT | Performed by: INTERNAL MEDICINE

## 2019-12-31 PROCEDURE — 84450 TRANSFERASE (AST) (SGOT): CPT | Performed by: INTERNAL MEDICINE

## 2020-01-01 ENCOUNTER — MYC MEDICAL ADVICE (OUTPATIENT)
Dept: FAMILY MEDICINE | Facility: CLINIC | Age: 60
End: 2020-01-01

## 2020-01-01 DIAGNOSIS — R74.8 ELEVATED LIVER ENZYMES: Primary | ICD-10-CM

## 2020-01-02 DIAGNOSIS — F41.1 GAD (GENERALIZED ANXIETY DISORDER): ICD-10-CM

## 2020-01-02 DIAGNOSIS — Z00.00 ROUTINE GENERAL MEDICAL EXAMINATION AT A HEALTH CARE FACILITY: ICD-10-CM

## 2020-01-02 DIAGNOSIS — F40.10 SOCIAL PHOBIA: ICD-10-CM

## 2020-01-02 RX ORDER — ALPRAZOLAM 0.25 MG
TABLET ORAL
Qty: 15 TABLET | Refills: 0 | Status: SHIPPED | OUTPATIENT
Start: 2020-01-02 | End: 2020-07-16

## 2020-01-02 RX ORDER — PROPRANOLOL HYDROCHLORIDE 10 MG/1
TABLET ORAL
Qty: 15 TABLET | Refills: 3 | Status: SHIPPED | OUTPATIENT
Start: 2020-01-02 | End: 2020-07-16

## 2020-01-02 NOTE — TELEPHONE ENCOUNTER
Please see [employer] message and advise. Thank you very much.    Trish Gallego RN, BSN  [facility]

## 2020-01-02 NOTE — TELEPHONE ENCOUNTER
Increase dose of cholesterol medication certainly can increase your liver functions.  I would suggest stay away from alcohol for a few weeks decrease the dose from 80 mg to 40 mg for the next 2 to 3 weeks and I am going to order labs.  If this continue to be high we can further evaluate please follow-up to discuss that.    Labs are ordered.  Get it done by the end of this month.

## 2020-01-02 NOTE — TELEPHONE ENCOUNTER
Reason for Call:  Medication or medication refill:    Do you use a [facility]?  Name of the pharmacy and phone number for the current request:  [facility] [address] - [phone]    Name of the medication requested: ALPRAZolam (XANAX) 0.25 MG tablet, propranolol (INDERAL) 10 MG tablet    Other request: Patient states that he is flying out for a presentation on Tuesday 1/7/2020 and needs it by then. Advised of 72 business hour policy.    Can we leave a detailed message on this number? NO    Phone number patient can be reached at: Cell number on file:    Telephone Information:   Mobile [phone]       Best Time: anytime    Call taken on 1/2/2020 at 9:03 AM by Erin AWAD

## 2020-01-02 NOTE — TELEPHONE ENCOUNTER
"    Requested Prescriptions   Pending Prescriptions Disp Refills     ALPRAZolam (XANAX) 0.25 MG tablet 15 tablet 0     Sig: TAKE AS DIRECTED FOR ANXIETY PRIOR TO PRESENTATION AND AS NEEDED FOR ANXIETY  Last Written Prescription Date:  06/12/19  Last Fill Quantity: 15,  # refills: 0   Last office visit: 9/27/2019 with prescribing provider:  0   Future Office Visit:           There is no refill protocol information for this order        propranolol (INDERAL) 10 MG tablet  Last Written Prescription Date:  09/6/18  Last Fill Quantity: 15,  # refills: 3   Last office visit: 9/27/2019 with prescribing provider:     Future Office Visit:    a 15 tablet 3     Sig: TAKE 1-2 TABLETS BY MOUTH 90 MINUTES BEFORE PRESENTATION       Beta-Blockers Protocol Failed - 1/2/2020  9:06 AM        Failed - Blood pressure under 140/90 in past 12 months     BP Readings from Last 3 Encounters:   11/25/19 (!) 140/92   09/27/19 120/86   09/06/18 120/88                 Passed - Patient is age 6 or older        Passed - Recent (12 mo) or future (30 days) visit within the authorizing provider's specialty     Patient has had an office visit with the authorizing provider or a provider within the authorizing providers department within the previous 12 mos or has a future within next 30 days. See \"Patient Info\" tab in inbasket, or \"Choose Columns\" in Meds & Orders section of the refill encounter.              Passed - Medication is active on med list          "

## 2020-01-03 DIAGNOSIS — E78.5 HYPERLIPIDEMIA LDL GOAL <130: ICD-10-CM

## 2020-01-03 RX ORDER — ATORVASTATIN CALCIUM 80 MG/1
40 TABLET, FILM COATED ORAL DAILY
Qty: 90 TABLET | Refills: 2 | COMMUNITY
Start: 2020-01-03 | End: 2020-03-04

## 2020-03-04 DIAGNOSIS — E78.5 HYPERLIPIDEMIA LDL GOAL <130: ICD-10-CM

## 2020-03-04 RX ORDER — ATORVASTATIN CALCIUM 80 MG/1
40 TABLET, FILM COATED ORAL DAILY
Qty: 90 TABLET | Refills: 2 | Status: SHIPPED | OUTPATIENT
Start: 2020-03-04 | End: 2020-09-30

## 2020-03-04 NOTE — TELEPHONE ENCOUNTER
Med was not refilled on 1/3/2020, it was a historical change to the 80 mg tabs per chart review.   Mercedes Mcgregor RN   [facility] - Triage

## 2020-03-04 NOTE — TELEPHONE ENCOUNTER
"    Requested Prescriptions   Pending Prescriptions Disp Refills     atorvastatin (LIPITOR) 80 MG tablet 90 tablet 2     Sig: Take 0.5 tablets (40 mg) by mouth daily       Statins Protocol Passed - 3/4/2020 11:21 AM        Passed - LDL on file in past 12 months     Recent Labs   Lab Test 12/31/19  0728   *             Passed - No abnormal creatine kinase in past 12 months     No lab results found.             Passed - Recent (12 mo) or future (30 days) visit within the authorizing provider's specialty     Patient has had an office visit with the authorizing provider or a provider within the authorizing providers department within the previous 12 mos or has a future within next 30 days. See \"Patient Info\" tab in inbasket, or \"Choose Columns\" in Meds & Orders section of the refill encounter.              Passed - Medication is active on med list        Passed - Patient is age 18 or older        Last Written Prescription Date:  1/3/20  Last Fill Quantity: 90,  # refills: 2   Last office visit: 9/27/2019 with prescribing provider:     Future Office Visit:      "

## 2020-03-04 NOTE — TELEPHONE ENCOUNTER
Patient should have a refill please see medication refill encounter starting 1/3/2020 this medication refill by his cardiologist Dr. RIVERA, if any issue let me know.

## 2020-03-04 NOTE — TELEPHONE ENCOUNTER
Reason for Call:  Medication or medication refill:    Do you use a [facility]?  Name of the pharmacy and phone number for the current request:     [facility] - [address]    Name of the medication requested: atorvastatin (LIPITOR) 80 MG tablet     Other request: Patient states he has gone down to 40 mg tab. Please [facility]     Can we leave a detailed message on this number? YES    Phone number patient can be reached at: Home number on file [phone] (home)    Best Time: anytime     Call taken on 3/4/2020 at 11:10 AM by Samantha Grajeda

## 2020-07-13 ENCOUNTER — NURSE TRIAGE (OUTPATIENT)
Dept: FAMILY MEDICINE | Facility: CLINIC | Age: 60
End: 2020-07-13

## 2020-07-13 ENCOUNTER — OFFICE VISIT (OUTPATIENT)
Dept: FAMILY MEDICINE | Facility: CLINIC | Age: 60
End: 2020-07-13
Payer: COMMERCIAL

## 2020-07-13 DIAGNOSIS — K52.9 GASTROENTERITIS: ICD-10-CM

## 2020-07-13 DIAGNOSIS — Z20.822 COVID-19 RULED OUT: ICD-10-CM

## 2020-07-13 DIAGNOSIS — R19.7 DIARRHEA, UNSPECIFIED TYPE: Primary | ICD-10-CM

## 2020-07-13 PROCEDURE — 99214 OFFICE O/P EST MOD 30 MIN: CPT | Performed by: FAMILY MEDICINE

## 2020-07-13 NOTE — TELEPHONE ENCOUNTER
Patient states that he traveled to [place] last week. Last Wednesday afternoon while on his vacation, he developed diarrhea. States that he also had a headache and chills. Was not able to check his temperature at that time.    States that he was with a group of about 18 people. They were drinking and partying.     Patient states that his dizziness is slowly going away. Hydrating with vitamin water.  Not able to check his temp today. Yesterday noon 97.8. No chills or aches today. No sore throat. No cough or SOB.  Had Covid 19 test done yesterday at [facility] in [place]. Was told up to 10 days for results. Patient to check [facility] portal for results.  No results at this time.     Has not taken anti diarrheal medication.    SEE TODAY IN OFFICE:   * You need to be examined today. Let me give you an appointment.  Patient scheduled at 2:20 today with Dr. Davis      Additional Information    Negative: Shock suspected (e.g., cold/pale/clammy skin, too weak to stand, low BP, rapid pulse)    Negative: Difficult to awaken or acting confused (e.g., disoriented, slurred speech)    Negative: Sounds like a life-threatening emergency to the triager    Negative: Vomiting also present and worse than the diarrhea    Negative: Blood in stool and without diarrhea    Negative: SEVERE abdominal pain (e.g., excruciating) and present > 1 hour    Negative: SEVERE abdominal pain and age > 60    Negative: Bloody, black, or tarry bowel movements    Negative: SEVERE diarrhea (e.g., 7 or more times / day more than normal) and age > 60 years    Negative: Constant abdominal pain lasting > 2 hours    Negative: Drinking very little and has signs of dehydration (e.g., no urine > 12 hours, very dry mouth, very lightheaded)    Negative: Patient sounds very sick or weak to the triager    SEVERE diarrhea (e.g., 7 or more times / day more than normal) and present > 24 hours (1 day)    Answer Assessment - Initial Assessment Questions  1.  "DIARRHEA SEVERITY: \"How bad is the diarrhea?\" \"How many extra stools have you had in the past 24 hours than normal?\"     - NO DIARRHEA (SCALE 0)    - MILD (SCALE 1-3): Few loose or mushy BMs; increase of 1-3 stools over normal daily number of stools; mild increase in ostomy output.    -  MODERATE (SCALE 4-7): Increase of 4-6 stools daily over normal; moderate increase in ostomy output.  * SEVERE (SCALE 8-10; OR 'WORST POSSIBLE'): Increase of 7 or more stools daily over normal; moderate increase in ostomy output; incontinence.      8-10 loose stools, small amounts at times  2. ONSET: \"When did the diarrhea begin?\"       Last Wednesday PM  3. BM CONSISTENCY: \"How loose or watery is the diarrhea?\"       Eating again, more of soft, loose. Was watery when wasn't eating  4. VOMITING: \"Are you also vomiting?\" If so, ask: \"How many times in the past 24 hours?\"       No, very nauseated.   5. ABDOMINAL PAIN: \"Are you having any abdominal pain?\" If yes: \"What does it feel like?\" (e.g., crampy, dull, intermittent, constant)       Cramping, lower, not severe, intermittent  6. ABDOMINAL PAIN SEVERITY: If present, ask: \"How bad is the pain?\"  (e.g., Scale 1-10; mild, moderate, or severe)    - MILD (1-3): doesn't interfere with normal activities, abdomen soft and not tender to touch     - MODERATE (4-7): interferes with normal activities or awakens from sleep, tender to touch     - SEVERE (8-10): excruciating pain, doubled over, unable to do any normal activities        moderate  7. ORAL INTAKE: If vomiting, \"Have you been able to drink liquids?\" \"How much fluids have you had in the past 24 hours?\"      Vitamin water, 5- 8 glasses of vitamin water, tap water.  8. HYDRATION: \"Any signs of dehydration?\" (e.g., dry mouth [not just dry lips], too weak to stand, dizziness, new weight loss) \"When did you last urinate?\"      Dry mouth, dizziness slowly improving. Urinating regularyl  9. EXPOSURE: \"Have you traveled to a foreign country " "recently?\" \"Have you been exposed to anyone with diarrhea?\" \"Could you have eaten any food that was spoiled?\"      No exposure to anyone with diarrhea. Traveled to [place], party, drinking.   10. ANTIBIOTIC USE: \"Are you taking antibiotics now or have you taken antibiotics in the past 2 months?\"        no  11. OTHER SYMPTOMS: \"Do you have any other symptoms?\" (e.g., fever, blood in stool)        No known fever. Did have some chills.   12. PREGNANCY: \"Is there any chance you are pregnant?\" \"When was your last menstrual period?\"        NA    Protocols used: DIARRHEA-A-OH  Terrie Hudson RN    "

## 2020-07-13 NOTE — PROGRESS NOTES
Subjective     Juni Jain is a 59 year old male who presents to clinic today for the following health issues:    HPI       Diarrhea      Duration: July 8th -tested for COVID yesterday no test results yet     Description:       Consistency of stool: watery and loose       Blood in stool: no but looked a little red        Number of loose stools past 24 hours:10 bowel movement     Intensity:  mild, moderate    Accompanying signs and symptoms: had HA and chills        Fever: no        Nausea/vomitting: YES some nausea        Abdominal pain: YES- lower abdominal cramping        Weight loss: no maybe a couple pounds     History (recent antibiotics or travel/ill contacts/med changes/testing done): recently came from [facility]     Precipitating or alleviating factors: None    Therapies tried and outcome: none   Patient did recently travel to [place] on plane.  On coming back he noticed fatigue tiredness and 2 days of fever and diarrhea.  His diarrhea is slowly resolving however he still have loose stools.  Denies any chest pain or shortness of breath.  Feeling slight fatigue.  He was out and about and he may have drink excessive alcohol as well.  No abdominal pain.          Reviewed and updated as needed this visit by Provider         Review of Systems   Constitutional, HEENT, cardiovascular, pulmonary, gi and gu systems are negative, except as otherwise noted.      Objective    There were no vitals taken for this visit.  There is no height or weight on file to calculate BMI.  Physical Exam   GENERAL: healthy, alert and no distress  NECK: no adenopathy, no asymmetry, masses, or scars and thyroid normal to palpation  RESP: lungs clear to auscultation - no rales, rhonchi or wheezes  CV: regular rate and rhythm, normal S1 S2, no S3 or S4, no murmur, click or rub, no peripheral edema and peripheral pulses strong  ABDOMEN: soft, nontender, no hepatosplenomegaly, no masses and bowel sounds normal  MS: no gross  "musculoskeletal defects noted, no edema            Assessment & Plan     1. Diarrhea, unspecified type  Patient has diarrhea symptoms since coming back from vacation.  He also had some symptoms of fatigue tiredness and chills.  Denies any other symptoms at this time.  I advised him he needs to increase hydration.  Slow transition to food.  There is no nausea currently apparently did not medicate this could be food poisoning.    2. Gastroenteritis      3. COVID-19 ruled out  Patient fever chills and diarrhea and recent travel possibility of cold is higher.  He already had test done yesterday he will let us know if is positive in case he needs to quarantine himself.  Watch for any signs of cough worsening shortness of breath or any other symptoms.  Symptomatic care discussed with the patient.  Avoid excessive alcohol.  I would not suggest any antibiotic any medication.       BMI:   Estimated body mass index is 29.82 kg/m  as calculated from the following:    Height as of 11/25/19: 1.854 m (6' 1\").    Weight as of 11/25/19: 102.5 kg (226 lb).     Ru Davis MD  [facility] [place]    "

## 2020-07-13 NOTE — TELEPHONE ENCOUNTER
Pt just came back from vacation in [place] on Saturday. He was not feeling well so he got a COVID test yesterday (7/12) and has not gotten the results back yet. He now wants to come in and be seen for GI issues. Please advise. Pt can be reached at [phone]. Thank you.  Maida Sandoval,

## 2020-09-09 DIAGNOSIS — F41.1 GAD (GENERALIZED ANXIETY DISORDER): ICD-10-CM

## 2020-09-09 DIAGNOSIS — F40.10 SOCIAL PHOBIA: ICD-10-CM

## 2020-09-10 ENCOUNTER — TELEPHONE (OUTPATIENT)
Dept: FAMILY MEDICINE | Facility: CLINIC | Age: 60
End: 2020-09-10

## 2020-09-10 RX ORDER — VENLAFAXINE HYDROCHLORIDE 150 MG/1
CAPSULE, EXTENDED RELEASE ORAL
Qty: 90 CAPSULE | Refills: 0 | Status: SHIPPED | OUTPATIENT
Start: 2020-09-10 | End: 2020-09-30

## 2020-09-10 NOTE — TELEPHONE ENCOUNTER
Routing refill request to provider for review/approval because:  Blood pressure above goal  BP Readings from Last 3 Encounters:   11/25/19 (!) 140/92   09/27/19 120/86   09/06/18 120/88   Terrie Hudson RN

## 2020-09-10 NOTE — TELEPHONE ENCOUNTER
General Call:     Who is calling:  patient    Reason for Call:  Calling to ask some questions about recent immunizations. They remember getting a hepatitis vaccine earlier this year but not sure if it was A or B. Pt was told to have the 2nd in 4 months and it is over 4 months due to covid. Pt is wondering if that is ok and if they can schedule for 2nd.    What are your questions or concerns:  n/a    Date of last appointment with provider: n/a    Okay to leave a detailed message:Yes at Cell number on file:    Telephone Information:   Mobile [phone]

## 2020-09-29 ENCOUNTER — OFFICE VISIT (OUTPATIENT)
Dept: FAMILY MEDICINE | Facility: CLINIC | Age: 60
End: 2020-09-29
Payer: COMMERCIAL

## 2020-09-29 VITALS
HEIGHT: 73 IN | HEART RATE: 78 BPM | DIASTOLIC BLOOD PRESSURE: 80 MMHG | TEMPERATURE: 96.5 F | WEIGHT: 223 LBS | BODY MASS INDEX: 29.55 KG/M2 | OXYGEN SATURATION: 97 % | SYSTOLIC BLOOD PRESSURE: 132 MMHG

## 2020-09-29 DIAGNOSIS — E78.5 HYPERLIPIDEMIA LDL GOAL <130: ICD-10-CM

## 2020-09-29 DIAGNOSIS — G47.33 OSA (OBSTRUCTIVE SLEEP APNEA): ICD-10-CM

## 2020-09-29 DIAGNOSIS — Z12.5 SCREENING FOR PROSTATE CANCER: ICD-10-CM

## 2020-09-29 DIAGNOSIS — Z00.00 ENCOUNTER FOR ANNUAL PHYSICAL EXAM: ICD-10-CM

## 2020-09-29 DIAGNOSIS — K21.9 GASTROESOPHAGEAL REFLUX DISEASE WITHOUT ESOPHAGITIS: ICD-10-CM

## 2020-09-29 DIAGNOSIS — F41.1 GAD (GENERALIZED ANXIETY DISORDER): Primary | ICD-10-CM

## 2020-09-29 DIAGNOSIS — Z23 NEED FOR VACCINATION: ICD-10-CM

## 2020-09-29 LAB
ALBUMIN SERPL-MCNC: 4.1 G/DL (ref 3.4–5)
ALP SERPL-CCNC: 88 U/L (ref 40–150)
ALT SERPL W P-5'-P-CCNC: 49 U/L (ref 0–70)
ANION GAP SERPL CALCULATED.3IONS-SCNC: 7 MMOL/L (ref 3–14)
AST SERPL W P-5'-P-CCNC: 21 U/L (ref 0–45)
BILIRUB SERPL-MCNC: 1.5 MG/DL (ref 0.2–1.3)
BUN SERPL-MCNC: 16 MG/DL (ref 7–30)
CALCIUM SERPL-MCNC: 9.4 MG/DL (ref 8.5–10.1)
CHLORIDE SERPL-SCNC: 105 MMOL/L (ref 94–109)
CHOLEST SERPL-MCNC: 200 MG/DL
CO2 SERPL-SCNC: 26 MMOL/L (ref 20–32)
CREAT SERPL-MCNC: 1.14 MG/DL (ref 0.66–1.25)
GFR SERPL CREATININE-BSD FRML MDRD: 71 ML/MIN/{1.73_M2}
GLUCOSE SERPL-MCNC: 95 MG/DL (ref 70–99)
HDLC SERPL-MCNC: 37 MG/DL
LDLC SERPL CALC-MCNC: 129 MG/DL
NONHDLC SERPL-MCNC: 163 MG/DL
POTASSIUM SERPL-SCNC: 4.5 MMOL/L (ref 3.4–5.3)
PROT SERPL-MCNC: 7.5 G/DL (ref 6.8–8.8)
PSA SERPL-ACNC: 3.26 UG/L (ref 0–4)
SODIUM SERPL-SCNC: 138 MMOL/L (ref 133–144)
TRIGL SERPL-MCNC: 170 MG/DL

## 2020-09-29 PROCEDURE — 90632 HEPA VACCINE ADULT IM: CPT | Performed by: FAMILY MEDICINE

## 2020-09-29 PROCEDURE — 99396 PREV VISIT EST AGE 40-64: CPT | Mod: 25 | Performed by: FAMILY MEDICINE

## 2020-09-29 PROCEDURE — 36415 COLL VENOUS BLD VENIPUNCTURE: CPT | Performed by: FAMILY MEDICINE

## 2020-09-29 PROCEDURE — G0103 PSA SCREENING: HCPCS | Performed by: FAMILY MEDICINE

## 2020-09-29 PROCEDURE — 80053 COMPREHEN METABOLIC PANEL: CPT | Performed by: FAMILY MEDICINE

## 2020-09-29 PROCEDURE — 80061 LIPID PANEL: CPT | Performed by: FAMILY MEDICINE

## 2020-09-29 PROCEDURE — 90471 IMMUNIZATION ADMIN: CPT | Performed by: FAMILY MEDICINE

## 2020-09-29 RX ORDER — TAMSULOSIN HYDROCHLORIDE 0.4 MG/1
CAPSULE ORAL
COMMUNITY
Start: 2020-07-07 | End: 2022-12-20

## 2020-09-29 ASSESSMENT — MIFFLIN-ST. JEOR: SCORE: 1884.65

## 2020-09-29 NOTE — PROGRESS NOTES
SUBJECTIVE:   CC: Juni Jain is an 59 year old male who presents for preventative health visit.       Patient has been advised of split billing requirements and indicates understanding: Yes  Healthy Habits:     Getting at least 3 servings of Calcium per day:  Yes    Bi-annual eye exam:  Yes    Dental care twice a year:  Yes    Sleep apnea or symptoms of sleep apnea:  Sleep apnea    Diet:  Regular (no restrictions)    Frequency of exercise:  2-3 days/week    Duration of exercise:  15-30 minutes    Taking medications regularly:  Yes    Medication side effects:  None    PHQ-2 Total Score: 1    Additional concerns today:  No    Over all feel good, working from home.          Today's PHQ-2 Score:   PHQ-2 ( 1999 Pfizer) 9/24/2020   Q1: Little interest or pleasure in doing things 1   Q2: Feeling down, depressed or hopeless 0   PHQ-2 Score 1   Q1: Little interest or pleasure in doing things Several days   Q2: Feeling down, depressed or hopeless Not at all   PHQ-2 Score 1       Abuse: Current or Past(Physical, Sexual or Emotional)- No  Do you feel safe in your environment? Yes        Social History     Tobacco Use     Smoking status: Never Smoker     Smokeless tobacco: Never Used     Tobacco comment: Do not smoke   Substance Use Topics     Alcohol use: Yes     Alcohol/week: 0.0 standard drinks     Comment: Weekends         Alcohol Use 9/24/2020   Prescreen: >3 drinks/day or >7 drinks/week? Yes   Prescreen: >3 drinks/day or >7 drinks/week? -   AUDIT SCORE  7       Last PSA:   PSA   Date Value Ref Range Status   09/27/2019 1.43 0 - 4 ug/L Final     Comment:     Assay Method:  Chemiluminescence using Siemens Vista analyzer       Reviewed orders with patient. Reviewed health maintenance and updated orders accordingly - Yes  Lab work is in process  Labs reviewed in EPIC    Reviewed and updated as needed this visit by clinical staff  Tobacco  Allergies  Meds         Reviewed and updated as needed this visit by  "Provider            Review of Systems  CONSTITUTIONAL: NEGATIVE for fever, chills, change in weight  INTEGUMENTARY/SKIN: NEGATIVE for worrisome rashes, moles or lesions  EYES: NEGATIVE for vision changes or irritation  ENT: NEGATIVE for ear, mouth and throat problems  RESP: NEGATIVE for significant cough or SOB  CV: NEGATIVE for chest pain, palpitations or peripheral edema  GI: NEGATIVE for nausea, abdominal pain, heartburn, or change in bowel habits   male: negative for dysuria, hematuria, decreased urinary stream, erectile dysfunction, urethral discharge  MUSCULOSKELETAL: NEGATIVE for significant arthralgias or myalgia  NEURO: NEGATIVE for weakness, dizziness or paresthesias  PSYCHIATRIC: NEGATIVE for changes in mood or affect    OBJECTIVE:   /80   Pulse 78   Temp 96.5  F (35.8  C) (Tympanic)   Ht 1.845 m (6' 0.64\")   Wt 101.2 kg (223 lb)   SpO2 97%   BMI 29.72 kg/m      Physical Exam  GENERAL: healthy, alert and no distress  EYES: Eyes grossly normal to inspection, PERRL and conjunctivae and sclerae normal  HENT: ear canals and TM's normal, nose and mouth without ulcers or lesions  NECK: no adenopathy, no asymmetry, masses, or scars and thyroid normal to palpation  RESP: lungs clear to auscultation - no rales, rhonchi or wheezes  CV: regular rate and rhythm, normal S1 S2, no S3 or S4, no murmur, click or rub, no peripheral edema and peripheral pulses strong  ABDOMEN: soft, nontender, no hepatosplenomegaly, no masses and bowel sounds normal  MS: no gross musculoskeletal defects noted, no edema  SKIN: no suspicious lesions or rashes  NEURO: Normal strength and tone, mentation intact and speech normal  PSYCH: mentation appears normal, affect normal/bright    Diagnostic Test Results:  Labs reviewed in Epic    ASSESSMENT/PLAN:   1. Encounter for annual physical exam    - Comprehensive metabolic panel  - Prostate spec antigen screen  - Lipid panel reflex to direct LDL Fasting    2. ELVIS (generalized " "anxiety disorder)  On Effexor, no side effects    3. Hyperlipidemia LDL goal <130  On 40 mg Lipitor daily dose was decrease due to slight elevation of liver function.  Discussed alcohol in take  - Comprehensive metabolic panel  - Lipid panel reflex to direct LDL Fasting    4. REY (obstructive sleep apnea)    5. Gastroesophageal reflux disease without esophagitis  Stable on PPI    6. Screening for prostate cancer    - Prostate spec antigen screen    7. Need for vaccination    - HEPATITIS A VACCINE, ADULT  [15882]  - 1st  Administration  [[id number]]    Patient has been advised of split billing requirements and indicates understanding: Yes  COUNSELING:   Reviewed preventive health counseling, as reflected in patient instructions       Regular exercise       Healthy diet/nutrition    Estimated body mass index is 29.72 kg/m  as calculated from the following:    Height as of this encounter: 1.845 m (6' 0.64\").    Weight as of this encounter: 101.2 kg (223 lb).         He reports that he has never smoked. He has never used smokeless tobacco.      Counseling Resources:  ATP IV Guidelines  Pooled Cohorts Equation Calculator  FRAX Risk Assessment  ICSI Preventive Guidelines  Dietary Guidelines for Americans, 2010  [employer]'s MyPlate  ASA Prophylaxis  Lung CA Screening    Ru Davis MD  [facility] [place]  "

## 2020-09-30 DIAGNOSIS — F40.10 SOCIAL PHOBIA: ICD-10-CM

## 2020-09-30 DIAGNOSIS — E78.5 HYPERLIPIDEMIA LDL GOAL <130: ICD-10-CM

## 2020-09-30 DIAGNOSIS — F41.1 GAD (GENERALIZED ANXIETY DISORDER): ICD-10-CM

## 2020-09-30 RX ORDER — ATORVASTATIN CALCIUM 40 MG/1
40 TABLET, FILM COATED ORAL DAILY
Qty: 90 TABLET | Refills: 3 | Status: SHIPPED | OUTPATIENT
Start: 2020-09-30 | End: 2021-10-08

## 2020-09-30 RX ORDER — VENLAFAXINE HYDROCHLORIDE 150 MG/1
150 CAPSULE, EXTENDED RELEASE ORAL DAILY
Qty: 90 CAPSULE | Refills: 1 | Status: SHIPPED | OUTPATIENT
Start: 2020-09-30 | End: 2021-03-23

## 2021-03-22 DIAGNOSIS — F41.1 GAD (GENERALIZED ANXIETY DISORDER): ICD-10-CM

## 2021-03-22 DIAGNOSIS — F40.10 SOCIAL PHOBIA: ICD-10-CM

## 2021-03-23 RX ORDER — VENLAFAXINE HYDROCHLORIDE 150 MG/1
CAPSULE, EXTENDED RELEASE ORAL
Qty: 90 CAPSULE | Refills: 1 | Status: SHIPPED | OUTPATIENT
Start: 2021-03-23 | End: 2021-10-08

## 2021-03-23 NOTE — TELEPHONE ENCOUNTER
Prescription approved per [facility] Refill Protocol.    Winnie GUTIERREZ RN  EP Triage

## 2021-03-27 ENCOUNTER — IMMUNIZATION (OUTPATIENT)
Dept: NURSING | Facility: CLINIC | Age: 61
End: 2021-03-27
Payer: COMMERCIAL

## 2021-03-27 PROCEDURE — 91301 PR COVID VAC MODERNA 100 MCG/0.5 ML IM: CPT

## 2021-03-27 PROCEDURE — 0011A PR COVID VAC MODERNA 100 MCG/0.5 ML IM: CPT

## 2021-04-24 ENCOUNTER — IMMUNIZATION (OUTPATIENT)
Dept: NURSING | Facility: CLINIC | Age: 61
End: 2021-04-24
Attending: INTERNAL MEDICINE
Payer: COMMERCIAL

## 2021-04-24 PROCEDURE — 0012A PR COVID VAC MODERNA 100 MCG/0.5 ML IM: CPT

## 2021-04-24 PROCEDURE — 91301 PR COVID VAC MODERNA 100 MCG/0.5 ML IM: CPT

## 2021-05-06 ENCOUNTER — HOSPITAL ENCOUNTER (OUTPATIENT)
Facility: CLINIC | Age: 61
End: 2021-05-06
Attending: OPHTHALMOLOGY | Admitting: OPHTHALMOLOGY
Payer: COMMERCIAL

## 2021-05-15 DIAGNOSIS — Z11.59 ENCOUNTER FOR SCREENING FOR OTHER VIRAL DISEASES: ICD-10-CM

## 2021-08-29 DIAGNOSIS — F40.10 SOCIAL PHOBIA: ICD-10-CM

## 2021-08-29 DIAGNOSIS — F41.1 GAD (GENERALIZED ANXIETY DISORDER): ICD-10-CM

## 2021-08-29 DIAGNOSIS — E78.5 HYPERLIPIDEMIA LDL GOAL <130: ICD-10-CM

## 2021-08-30 RX ORDER — ATORVASTATIN CALCIUM 40 MG/1
TABLET, FILM COATED ORAL
Qty: 90 TABLET | Refills: 3 | OUTPATIENT
Start: 2021-08-30

## 2021-08-30 RX ORDER — VENLAFAXINE HYDROCHLORIDE 150 MG/1
CAPSULE, EXTENDED RELEASE ORAL
Qty: 90 CAPSULE | Refills: 3 | OUTPATIENT
Start: 2021-08-30

## 2021-09-18 ENCOUNTER — HEALTH MAINTENANCE LETTER (OUTPATIENT)
Age: 61
End: 2021-09-18

## 2021-10-06 DIAGNOSIS — F41.1 GAD (GENERALIZED ANXIETY DISORDER): ICD-10-CM

## 2021-10-06 DIAGNOSIS — F40.10 SOCIAL PHOBIA: ICD-10-CM

## 2021-10-06 DIAGNOSIS — E78.5 HYPERLIPIDEMIA LDL GOAL <130: ICD-10-CM

## 2021-10-06 RX ORDER — ATORVASTATIN CALCIUM 40 MG/1
40 TABLET, FILM COATED ORAL DAILY
Qty: 90 TABLET | Refills: 3 | Status: CANCELLED | OUTPATIENT
Start: 2021-10-06

## 2021-10-06 RX ORDER — VENLAFAXINE HYDROCHLORIDE 150 MG/1
150 CAPSULE, EXTENDED RELEASE ORAL DAILY
Qty: 90 CAPSULE | Refills: 1 | Status: CANCELLED | OUTPATIENT
Start: 2021-10-06

## 2021-10-07 ASSESSMENT — ENCOUNTER SYMPTOMS
HEMATURIA: 0
CONSTIPATION: 0
HEADACHES: 0
NERVOUS/ANXIOUS: 0
ABDOMINAL PAIN: 0
NAUSEA: 0
WEAKNESS: 0
COUGH: 0
JOINT SWELLING: 0
MYALGIAS: 0
DIZZINESS: 0
FEVER: 0
HEARTBURN: 0
SORE THROAT: 0
SHORTNESS OF BREATH: 0
DIARRHEA: 0
DYSURIA: 0
HEMATOCHEZIA: 0
PALPITATIONS: 0
FREQUENCY: 0
CHILLS: 0
EYE PAIN: 0
ARTHRALGIAS: 0
PARESTHESIAS: 0

## 2021-10-08 ENCOUNTER — OFFICE VISIT (OUTPATIENT)
Dept: FAMILY MEDICINE | Facility: CLINIC | Age: 61
End: 2021-10-08
Payer: COMMERCIAL

## 2021-10-08 VITALS
HEART RATE: 68 BPM | TEMPERATURE: 96.7 F | SYSTOLIC BLOOD PRESSURE: 136 MMHG | RESPIRATION RATE: 16 BRPM | BODY MASS INDEX: 29.42 KG/M2 | WEIGHT: 222 LBS | DIASTOLIC BLOOD PRESSURE: 86 MMHG | OXYGEN SATURATION: 98 % | HEIGHT: 73 IN

## 2021-10-08 DIAGNOSIS — F41.1 GAD (GENERALIZED ANXIETY DISORDER): ICD-10-CM

## 2021-10-08 DIAGNOSIS — Z00.00 ROUTINE GENERAL MEDICAL EXAMINATION AT A HEALTH CARE FACILITY: Primary | ICD-10-CM

## 2021-10-08 DIAGNOSIS — E78.5 HYPERLIPIDEMIA LDL GOAL <130: ICD-10-CM

## 2021-10-08 DIAGNOSIS — F40.10 SOCIAL PHOBIA: ICD-10-CM

## 2021-10-08 LAB
ALBUMIN SERPL-MCNC: 3.9 G/DL (ref 3.4–5)
ALP SERPL-CCNC: 73 U/L (ref 40–150)
ALT SERPL W P-5'-P-CCNC: 71 U/L (ref 0–70)
ANION GAP SERPL CALCULATED.3IONS-SCNC: 9 MMOL/L (ref 3–14)
AST SERPL W P-5'-P-CCNC: 26 U/L (ref 0–45)
BILIRUB SERPL-MCNC: 1 MG/DL (ref 0.2–1.3)
BUN SERPL-MCNC: 17 MG/DL (ref 7–30)
CALCIUM SERPL-MCNC: 8.9 MG/DL (ref 8.5–10.1)
CHLORIDE BLD-SCNC: 105 MMOL/L (ref 94–109)
CHOLEST SERPL-MCNC: 194 MG/DL
CO2 SERPL-SCNC: 22 MMOL/L (ref 20–32)
CREAT SERPL-MCNC: 1.14 MG/DL (ref 0.66–1.25)
FASTING STATUS PATIENT QL REPORTED: YES
GFR SERPL CREATININE-BSD FRML MDRD: 70 ML/MIN/1.73M2
GLUCOSE BLD-MCNC: 97 MG/DL (ref 70–99)
HDLC SERPL-MCNC: 34 MG/DL
LDLC SERPL CALC-MCNC: 132 MG/DL
NONHDLC SERPL-MCNC: 160 MG/DL
POTASSIUM BLD-SCNC: 4.2 MMOL/L (ref 3.4–5.3)
PROT SERPL-MCNC: 7.2 G/DL (ref 6.8–8.8)
PSA SERPL-MCNC: 2.7 UG/L (ref 0–4)
SODIUM SERPL-SCNC: 136 MMOL/L (ref 133–144)
TRIGL SERPL-MCNC: 139 MG/DL

## 2021-10-08 PROCEDURE — 96127 BRIEF EMOTIONAL/BEHAV ASSMT: CPT | Performed by: FAMILY MEDICINE

## 2021-10-08 PROCEDURE — 99214 OFFICE O/P EST MOD 30 MIN: CPT | Mod: 25 | Performed by: FAMILY MEDICINE

## 2021-10-08 PROCEDURE — 90471 IMMUNIZATION ADMIN: CPT | Performed by: FAMILY MEDICINE

## 2021-10-08 PROCEDURE — 99396 PREV VISIT EST AGE 40-64: CPT | Mod: 25 | Performed by: FAMILY MEDICINE

## 2021-10-08 PROCEDURE — 80061 LIPID PANEL: CPT | Performed by: FAMILY MEDICINE

## 2021-10-08 PROCEDURE — G0103 PSA SCREENING: HCPCS | Performed by: FAMILY MEDICINE

## 2021-10-08 PROCEDURE — 36415 COLL VENOUS BLD VENIPUNCTURE: CPT | Performed by: FAMILY MEDICINE

## 2021-10-08 PROCEDURE — 90682 RIV4 VACC RECOMBINANT DNA IM: CPT | Performed by: FAMILY MEDICINE

## 2021-10-08 PROCEDURE — 80053 COMPREHEN METABOLIC PANEL: CPT | Performed by: FAMILY MEDICINE

## 2021-10-08 RX ORDER — VENLAFAXINE HYDROCHLORIDE 150 MG/1
150 CAPSULE, EXTENDED RELEASE ORAL DAILY
Qty: 90 CAPSULE | Refills: 1 | Status: SHIPPED | OUTPATIENT
Start: 2021-10-08 | End: 2021-10-24

## 2021-10-08 RX ORDER — ATORVASTATIN CALCIUM 40 MG/1
40 TABLET, FILM COATED ORAL DAILY
Qty: 90 TABLET | Refills: 3 | Status: SHIPPED | OUTPATIENT
Start: 2021-10-08 | End: 2021-10-25

## 2021-10-08 ASSESSMENT — ENCOUNTER SYMPTOMS
HEARTBURN: 0
HEADACHES: 0
PALPITATIONS: 0
CONSTIPATION: 0
NERVOUS/ANXIOUS: 0
FEVER: 0
DIARRHEA: 0
NAUSEA: 0
FREQUENCY: 0
WEAKNESS: 0
EYE PAIN: 0
DIZZINESS: 0
MYALGIAS: 0
CHILLS: 0
ABDOMINAL PAIN: 0
PARESTHESIAS: 0
JOINT SWELLING: 0
ARTHRALGIAS: 0
HEMATOCHEZIA: 0
SORE THROAT: 0
SHORTNESS OF BREATH: 0
COUGH: 0
HEMATURIA: 0
DYSURIA: 0

## 2021-10-08 ASSESSMENT — PAIN SCALES - GENERAL: PAINLEVEL: NO PAIN (0)

## 2021-10-08 ASSESSMENT — MIFFLIN-ST. JEOR: SCORE: 1875.74

## 2021-10-08 ASSESSMENT — PATIENT HEALTH QUESTIONNAIRE - PHQ9: SUM OF ALL RESPONSES TO PHQ QUESTIONS 1-9: 0

## 2021-10-08 NOTE — PROGRESS NOTES
SUBJECTIVE:   CC: Juni Jain is an 60 year old male who presents for preventative health visit.       Patient has been advised of split billing requirements and indicates understanding: Yes  Healthy Habits:     Getting at least 3 servings of Calcium per day:  Yes    Bi-annual eye exam:  Yes    Dental care twice a year:  Yes    Sleep apnea or symptoms of sleep apnea:  Sleep apnea    Diet:  Regular (no restrictions)    Frequency of exercise:  1 day/week    Duration of exercise:  15-30 minutes    Taking medications regularly:  Yes    Medication side effects:  None    PHQ-2 Total Score: 0    Additional concerns today:  No    Blood pressure numbers mildly elevated recheck was somewhat better.  Advised to check it at home and let us know what her numbers look like at home setting      Hyperlipidemia Follow-Up  Taking Lipitor 40 mg.    Are you regularly taking any medication or supplement to lower your cholesterol?   Yes- Atorvastatin     Are you having muscle aches or other side effects that you think could be caused by your cholesterol lowering medication?  No    Anxiety Follow-Up  Stable on effexor    How are you doing with your anxiety since your last visit? No change    Are you having other symptoms that might be associated with anxiety? No    Have you had a significant life event? No     Are you feeling depressed? No    Do you have any concerns with your use of alcohol or other drugs? No    Social History     Tobacco Use     Smoking status: Never Smoker     Smokeless tobacco: Never Used     Tobacco comment: Do not smoke   Substance Use Topics     Alcohol use: Yes     Alcohol/week: 0.0 standard drinks     Comment: Weekends     Drug use: No     ELVIS-7 SCORE 3/29/2018 9/6/2018 9/27/2019   Total Score - - -   Total Score 4 3 5     PHQ 9/6/2018 9/27/2019 10/8/2021   PHQ-9 Total Score 3 2 0   Q9: Thoughts of better off dead/self-harm past 2 weeks Not at all Not at all Not at all     Last PHQ-9 10/8/2021   1.  Little  interest or pleasure in doing things 0   2.  Feeling down, depressed, or hopeless 0   3.  Trouble falling or staying asleep, or sleeping too much 0   4.  Feeling tired or having little energy 0   5.  Poor appetite or overeating 0   6.  Feeling bad about yourself 0   7.  Trouble concentrating 0   8.  Moving slowly or restless 0   Q9: Thoughts of better off dead/self-harm past 2 weeks 0   PHQ-9 Total Score 0   Difficulty at work, home, or with people -     ELVIS-7  9/27/2019   1. Feeling nervous, anxious, or on edge 1   2. Not being able to stop or control worrying 1   3. Worrying too much about different things 1   4. Trouble relaxing 1   5. Being so restless that it is hard to sit still 0   6. Becoming easily annoyed or irritable 1   7. Feeling afraid, as if something awful might happen 0   ELVIS-7 Total Score 5   If you checked any problems, how difficult have they made it for you to do your work, take care of things at home, or get along with other people? Not difficult at all         Today's PHQ-2 Score:   PHQ-2 ( 1999 [employer]) 10/7/2021   Q1: Little interest or pleasure in doing things 0   Q2: Feeling down, depressed or hopeless 0   PHQ-2 Score 0   Q1: Little interest or pleasure in doing things Not at all   Q2: Feeling down, depressed or hopeless Not at all   PHQ-2 Score 0       Abuse: Current or Past(Physical, Sexual or Emotional)- No  Do you feel safe in your environment? Yes        Social History     Tobacco Use     Smoking status: Never Smoker     Smokeless tobacco: Never Used     Tobacco comment: Do not smoke   Substance Use Topics     Alcohol use: Yes     Alcohol/week: 0.0 standard drinks     Comment: Weekends         Alcohol Use 10/7/2021   Prescreen: >3 drinks/day or >7 drinks/week? Yes   Prescreen: >3 drinks/day or >7 drinks/week? -   AUDIT SCORE  8       Last PSA:   PSA   Date Value Ref Range Status   09/29/2020 3.26 0 - 4 ug/L Final     Comment:     Assay Method:  Chemiluminescence using Siemens Vista  "analyzer       Reviewed orders with patient. Reviewed health maintenance and updated orders accordingly - Yes  Lab work is in process    Reviewed and updated as needed this visit by clinical staff  Tobacco  Allergies  Meds  Problems             Reviewed and updated as needed this visit by Provider     Problems                Review of Systems   Constitutional: Negative for chills and fever.   HENT: Negative for congestion, ear pain, hearing loss and sore throat.    Eyes: Negative for pain and visual disturbance.   Respiratory: Negative for cough and shortness of breath.    Cardiovascular: Negative for chest pain, palpitations and peripheral edema.   Gastrointestinal: Negative for abdominal pain, constipation, diarrhea, heartburn, hematochezia and nausea.   Genitourinary: Negative for discharge, dysuria, frequency, genital sores, hematuria, impotence and urgency.   Musculoskeletal: Negative for arthralgias, joint swelling and myalgias.   Skin: Negative for rash.   Neurological: Negative for dizziness, weakness, headaches and paresthesias.   Psychiatric/Behavioral: Negative for mood changes. The patient is not nervous/anxious.      OBJECTIVE:   /86   Pulse 68   Temp (!) 96.7  F (35.9  C) (Tympanic)   Resp 16   Ht 1.846 m (6' 0.68\")   Wt 100.7 kg (222 lb)   SpO2 98%   BMI 29.55 kg/m      Physical Exam  GENERAL: healthy, alert and no distress  EYES: Eyes grossly normal to inspection, PERRL and conjunctivae and sclerae normal  HENT: ear canals and TM's normal, nose and mouth without ulcers or lesions  NECK: no adenopathy, no asymmetry, masses, or scars and thyroid normal to palpation  RESP: lungs clear to auscultation - no rales, rhonchi or wheezes  CV: regular rate and rhythm, normal S1 S2, no S3 or S4, no murmur, click or rub, no peripheral edema and peripheral pulses strong  ABDOMEN: soft, nontender, no hepatosplenomegaly, no masses and bowel sounds normal  MS: no gross musculoskeletal defects noted, " "no edema  SKIN: no suspicious lesions or rashes  NEURO: Normal strength and tone, mentation intact and speech normal  PSYCH: mentation appears normal, affect normal/bright    Diagnostic Test Results:  Labs reviewed in Epic    ASSESSMENT/PLAN:   Juni was seen today for physical.    Diagnoses and all orders for this visit:    Routine general medical examination at a health care facility  Advised checking blood pressure at home and home setting let us know if the readings are high we can discuss about getting some blood pressure medication.  -     Comprehensive metabolic panel; Future  -     Lipid Profile; Future  -     PSA, screen; Future  -     Comprehensive metabolic panel  -     Lipid Profile  -     PSA, screen    Hyperlipidemia LDL goal <130  -     atorvastatin (LIPITOR) 40 MG tablet; Take 1 tablet (40 mg) by mouth daily    Social phobia  -     venlafaxine (EFFEXOR-XR) 150 MG 24 hr capsule; Take 1 capsule (150 mg) by mouth daily    ELVIS (generalized anxiety disorder)  -     venlafaxine (EFFEXOR-XR) 150 MG 24 hr capsule; Take 1 capsule (150 mg) by mouth daily    Other orders  -     INFLUENZA QUAD, RECOMBINANT, P-FREE (RIV4) (FLUBLOK)  -     REVIEW OF HEALTH MAINTENANCE PROTOCOL ORDERS        Patient has been advised of split billing requirements and indicates understanding: Yes  COUNSELING:   Reviewed preventive health counseling, as reflected in patient instructions       Regular exercise       Healthy diet/nutrition    Estimated body mass index is 29.55 kg/m  as calculated from the following:    Height as of this encounter: 1.846 m (6' 0.68\").    Weight as of this encounter: 100.7 kg (222 lb).         He reports that he has never smoked. He has never used smokeless tobacco.      Counseling Resources:  ATP IV Guidelines  Pooled Cohorts Equation Calculator  FRAX Risk Assessment  ICSI Preventive Guidelines  Dietary Guidelines for Americans, 2010  USDA's MyPlate  ASA Prophylaxis  Lung CA Screening    Ru Davis, " MD  [facility] [place]

## 2021-10-24 ENCOUNTER — NURSE TRIAGE (OUTPATIENT)
Dept: NURSING | Facility: CLINIC | Age: 61
End: 2021-10-24

## 2021-10-24 DIAGNOSIS — F41.1 GAD (GENERALIZED ANXIETY DISORDER): ICD-10-CM

## 2021-10-24 DIAGNOSIS — F40.10 SOCIAL PHOBIA: ICD-10-CM

## 2021-10-24 RX ORDER — VENLAFAXINE HYDROCHLORIDE 150 MG/1
150 CAPSULE, EXTENDED RELEASE ORAL DAILY
Qty: 30 CAPSULE | Refills: 0
Start: 2021-10-24 | End: 2021-11-19

## 2021-10-24 NOTE — TELEPHONE ENCOUNTER
Pt had to travel to [place] as his son is in the ICU.  Pt left at home his mail order just refilled Rx Venlafaxine.  Pt requesting refill of 30 days called to Pharmacy at [facility].  Triage RN called Pharmacy [phone] per Rx Med Refill Protocol given to Pharmacist.  Shaye Nunez RN, MA  [facility] Nurse Advisor

## 2021-10-25 ENCOUNTER — TELEPHONE (OUTPATIENT)
Dept: FAMILY MEDICINE | Facility: CLINIC | Age: 61
End: 2021-10-25

## 2021-10-25 DIAGNOSIS — E78.5 HYPERLIPIDEMIA LDL GOAL <130: ICD-10-CM

## 2021-10-25 RX ORDER — ATORVASTATIN CALCIUM 40 MG/1
40 TABLET, FILM COATED ORAL DAILY
Qty: 30 TABLET | Refills: 0 | Status: SHIPPED | OUTPATIENT
Start: 2021-10-25 | End: 2021-11-19

## 2021-10-25 NOTE — TELEPHONE ENCOUNTER
Patient calling to report that his son is still in the ICU in [place]. Patient states that he has only 3 atorvastatin left. Requests temporary refill of atorvastatin sent to pharmacy in [place].  Prescription approved per [facility] Refill Protocol.  Terrie Hudson RN

## 2021-11-18 DIAGNOSIS — E78.5 HYPERLIPIDEMIA LDL GOAL <130: ICD-10-CM

## 2021-11-18 DIAGNOSIS — F41.1 GAD (GENERALIZED ANXIETY DISORDER): ICD-10-CM

## 2021-11-18 DIAGNOSIS — F40.10 SOCIAL PHOBIA: ICD-10-CM

## 2021-11-19 RX ORDER — ATORVASTATIN CALCIUM 40 MG/1
40 TABLET, FILM COATED ORAL DAILY
Qty: 90 TABLET | Refills: 2 | Status: SHIPPED | OUTPATIENT
Start: 2021-11-19 | End: 2022-11-22

## 2021-11-19 RX ORDER — VENLAFAXINE HYDROCHLORIDE 150 MG/1
150 CAPSULE, EXTENDED RELEASE ORAL DAILY
Qty: 90 CAPSULE | Refills: 2 | Status: SHIPPED | OUTPATIENT
Start: 2021-11-19 | End: 2022-05-22

## 2022-05-19 DIAGNOSIS — F41.1 GAD (GENERALIZED ANXIETY DISORDER): ICD-10-CM

## 2022-05-19 DIAGNOSIS — F40.10 SOCIAL PHOBIA: ICD-10-CM

## 2022-05-22 RX ORDER — VENLAFAXINE HYDROCHLORIDE 150 MG/1
CAPSULE, EXTENDED RELEASE ORAL
Qty: 90 CAPSULE | Refills: 0 | Status: SHIPPED | OUTPATIENT
Start: 2022-05-22 | End: 2022-08-24

## 2022-05-22 NOTE — TELEPHONE ENCOUNTER
Prescription approved per [facility], [facility] or [facility] refill protocol.  Yeimi LI - Registered Nurse  [facility]  Acute and Diagnostic Services

## 2022-08-22 DIAGNOSIS — F40.10 SOCIAL PHOBIA: ICD-10-CM

## 2022-08-22 DIAGNOSIS — F41.1 GAD (GENERALIZED ANXIETY DISORDER): ICD-10-CM

## 2022-08-24 RX ORDER — VENLAFAXINE HYDROCHLORIDE 150 MG/1
CAPSULE, EXTENDED RELEASE ORAL
Qty: 90 CAPSULE | Refills: 0 | Status: SHIPPED | OUTPATIENT
Start: 2022-08-24 | End: 2022-11-22

## 2022-08-24 NOTE — TELEPHONE ENCOUNTER
Prescription approved per [facility] Refill Protocol.    Milagros Jimenez RN  [facility] Triage Team

## 2022-11-18 DIAGNOSIS — F40.10 SOCIAL PHOBIA: ICD-10-CM

## 2022-11-18 DIAGNOSIS — E78.5 HYPERLIPIDEMIA LDL GOAL <130: ICD-10-CM

## 2022-11-18 DIAGNOSIS — F41.1 GAD (GENERALIZED ANXIETY DISORDER): ICD-10-CM

## 2022-11-20 ENCOUNTER — HEALTH MAINTENANCE LETTER (OUTPATIENT)
Age: 62
End: 2022-11-20

## 2022-11-21 NOTE — TELEPHONE ENCOUNTER
Routing refill request to provider for review/approval because:  Labs out of range/not current:  LDL, Creat  Patient needs to be seen because it has been more than 1 year since last office visit. Nothing upcoming scheduled.  BP Readings from Last 3 Encounters:   10/08/21 136/86   09/29/20 132/80   11/25/19 (!) 140/92     Lizette LITTLE RN  [facility]

## 2022-11-22 RX ORDER — ATORVASTATIN CALCIUM 40 MG/1
TABLET, FILM COATED ORAL
Qty: 90 TABLET | Refills: 0 | Status: SHIPPED | OUTPATIENT
Start: 2022-11-22 | End: 2022-12-20

## 2022-11-22 RX ORDER — VENLAFAXINE HYDROCHLORIDE 150 MG/1
CAPSULE, EXTENDED RELEASE ORAL
Qty: 90 CAPSULE | Refills: 0 | Status: SHIPPED | OUTPATIENT
Start: 2022-11-22 | End: 2022-12-20

## 2022-11-22 NOTE — TELEPHONE ENCOUNTER
Received a call back from the patient. Informed patient medications were refilled and sent to the pharmacy.     Patient would like to get his appointment scheduled now for the end of December. Ok to schedule patient in same day or next day time slot?    Will route to PCP for review.     Can we leave a detailed message on this number? YES  Phone number patient can be reached at: Cell number on file:    Telephone Information:   Mobile [phone]       Adore Peterson RN  [facility] Triage

## 2022-11-22 NOTE — TELEPHONE ENCOUNTER
Pt called the clinic stating he is in [place] due to his son Daniel getting oxygen treatments there and is unable to see PCP until December.   Pt will be home after dec 16th. Pt stated Dr. Davis is aware of pts situation.     Pt is asking that PCP squeeze him in for an appt in Dec and provide one more refill until pt can see PCP.     Routing to PCP for recommendations.     Please call pt back with PCPs recommendations.   Can we leave a detailed message on this number? YES  Phone number patient can be reached at: Cell number on file:    Telephone Information:   Mobile [phone]       Beth Pena, RN  [facility] Triage

## 2022-12-06 ENCOUNTER — HOSPITAL ENCOUNTER (EMERGENCY)
Facility: HOSPITAL | Age: 62
Discharge: HOME | End: 2022-12-07
Attending: EMERGENCY MEDICINE
Payer: COMMERCIAL

## 2022-12-06 DIAGNOSIS — R07.9 CHEST PAIN: Primary | ICD-10-CM

## 2022-12-06 LAB
ALBUMIN SERPL BCP-MCNC: 4.3 G/DL (ref 3.5–5.2)
ALP SERPL-CCNC: 82 U/L (ref 55–135)
ALT SERPL W/O P-5'-P-CCNC: 34 U/L (ref 10–44)
ANION GAP SERPL CALC-SCNC: 10 MMOL/L (ref 8–16)
AST SERPL-CCNC: 20 U/L (ref 10–40)
BASOPHILS # BLD AUTO: 0.03 K/UL (ref 0–0.2)
BASOPHILS NFR BLD: 0.4 % (ref 0–1.9)
BILIRUB SERPL-MCNC: 0.9 MG/DL (ref 0.1–1)
BNP SERPL-MCNC: <10 PG/ML (ref 0–99)
BUN SERPL-MCNC: 19 MG/DL (ref 6–20)
CALCIUM SERPL-MCNC: 9.4 MG/DL (ref 8.7–10.5)
CHLORIDE SERPL-SCNC: 106 MMOL/L (ref 95–110)
CO2 SERPL-SCNC: 25 MMOL/L (ref 23–29)
CREAT SERPL-MCNC: 1.3 MG/DL (ref 0.5–1.4)
DIFFERENTIAL METHOD: ABNORMAL
EOSINOPHIL # BLD AUTO: 0.2 K/UL (ref 0–0.5)
EOSINOPHIL NFR BLD: 2.4 % (ref 0–8)
ERYTHROCYTE [DISTWIDTH] IN BLOOD BY AUTOMATED COUNT: 13 % (ref 11.5–14.5)
EST. GFR  (NO RACE VARIABLE): >60 ML/MIN/1.73 M^2
GLUCOSE SERPL-MCNC: 98 MG/DL (ref 70–110)
HCT VFR BLD AUTO: 46.2 % (ref 40–54)
HGB BLD-MCNC: 16.2 G/DL (ref 14–18)
IMM GRANULOCYTES # BLD AUTO: 0.02 K/UL (ref 0–0.04)
IMM GRANULOCYTES NFR BLD AUTO: 0.2 % (ref 0–0.5)
LYMPHOCYTES # BLD AUTO: 2.2 K/UL (ref 1–4.8)
LYMPHOCYTES NFR BLD: 27.2 % (ref 18–48)
MCH RBC QN AUTO: 29.8 PG (ref 27–31)
MCHC RBC AUTO-ENTMCNC: 35.1 G/DL (ref 32–36)
MCV RBC AUTO: 85 FL (ref 82–98)
MONOCYTES # BLD AUTO: 0.7 K/UL (ref 0.3–1)
MONOCYTES NFR BLD: 8.2 % (ref 4–15)
NEUTROPHILS # BLD AUTO: 5 K/UL (ref 1.8–7.7)
NEUTROPHILS NFR BLD: 61.6 % (ref 38–73)
NRBC BLD-RTO: 0 /100 WBC
PLATELET # BLD AUTO: 251 K/UL (ref 150–450)
PMV BLD AUTO: 9 FL (ref 9.2–12.9)
POTASSIUM SERPL-SCNC: 4.4 MMOL/L (ref 3.5–5.1)
PROT SERPL-MCNC: 7.3 G/DL (ref 6–8.4)
RBC # BLD AUTO: 5.43 M/UL (ref 4.6–6.2)
SODIUM SERPL-SCNC: 141 MMOL/L (ref 136–145)
TROPONIN I SERPL DL<=0.01 NG/ML-MCNC: <0.006 NG/ML (ref 0–0.03)
WBC # BLD AUTO: 8.05 K/UL (ref 3.9–12.7)

## 2022-12-06 PROCEDURE — 99285 EMERGENCY DEPT VISIT HI MDM: CPT | Mod: ,,, | Performed by: PHYSICIAN ASSISTANT

## 2022-12-06 PROCEDURE — 93010 ELECTROCARDIOGRAM REPORT: CPT | Mod: ,,, | Performed by: INTERNAL MEDICINE

## 2022-12-06 PROCEDURE — 93005 ELECTROCARDIOGRAM TRACING: CPT

## 2022-12-06 PROCEDURE — 99285 PR EMERGENCY DEPT VISIT,LEVEL V: ICD-10-PCS | Mod: ,,, | Performed by: PHYSICIAN ASSISTANT

## 2022-12-06 PROCEDURE — 84484 ASSAY OF TROPONIN QUANT: CPT | Performed by: NURSE PRACTITIONER

## 2022-12-06 PROCEDURE — 85025 COMPLETE CBC W/AUTO DIFF WBC: CPT | Performed by: NURSE PRACTITIONER

## 2022-12-06 PROCEDURE — 25000003 PHARM REV CODE 250: Performed by: PHYSICIAN ASSISTANT

## 2022-12-06 PROCEDURE — 93010 EKG 12-LEAD: ICD-10-PCS | Mod: ,,, | Performed by: INTERNAL MEDICINE

## 2022-12-06 PROCEDURE — 99285 EMERGENCY DEPT VISIT HI MDM: CPT | Mod: 25

## 2022-12-06 PROCEDURE — 80053 COMPREHEN METABOLIC PANEL: CPT | Performed by: NURSE PRACTITIONER

## 2022-12-06 PROCEDURE — 94761 N-INVAS EAR/PLS OXIMETRY MLT: CPT

## 2022-12-06 PROCEDURE — 83880 ASSAY OF NATRIURETIC PEPTIDE: CPT | Performed by: NURSE PRACTITIONER

## 2022-12-06 RX ORDER — ASPIRIN 325 MG
325 TABLET ORAL
Status: COMPLETED | OUTPATIENT
Start: 2022-12-06 | End: 2022-12-06

## 2022-12-06 RX ADMIN — ASPIRIN 325 MG ORAL TABLET 325 MG: 325 PILL ORAL at 11:12

## 2022-12-07 VITALS
SYSTOLIC BLOOD PRESSURE: 148 MMHG | HEIGHT: 73 IN | RESPIRATION RATE: 16 BRPM | WEIGHT: 215 LBS | BODY MASS INDEX: 28.49 KG/M2 | HEART RATE: 64 BPM | OXYGEN SATURATION: 98 % | TEMPERATURE: 98 F | DIASTOLIC BLOOD PRESSURE: 89 MMHG

## 2022-12-07 LAB — TROPONIN I SERPL DL<=0.01 NG/ML-MCNC: 0.01 NG/ML (ref 0–0.03)

## 2022-12-07 PROCEDURE — 93005 ELECTROCARDIOGRAM TRACING: CPT

## 2022-12-07 PROCEDURE — 93010 EKG 12-LEAD: ICD-10-PCS | Mod: ,,, | Performed by: INTERNAL MEDICINE

## 2022-12-07 PROCEDURE — 84484 ASSAY OF TROPONIN QUANT: CPT | Performed by: NURSE PRACTITIONER

## 2022-12-07 PROCEDURE — 93010 ELECTROCARDIOGRAM REPORT: CPT | Mod: ,,, | Performed by: INTERNAL MEDICINE

## 2022-12-07 NOTE — ED PROVIDER NOTES
Encounter Date: 12/6/2022       History     Chief Complaint   Patient presents with    Chest Pain     Cp radiates down left arm. +SOB. Pt reports being in a lot of stress lately. -cardiac history      62-year-old male history hyperlipidemia, anxiety, sleep apnea and prediabetes presents the ED for chest pain and shortness of breath.  States he was helping his son move from a wheelchair and began having left-sided 6/10 chest tightness with shortness of breath.  States that the pain radiates down his left arm.  States that it was exertional in resolved after 30 minutes with rest.  Denies any palpitations or diaphoresis.  States he has had some exertional chest pain over the past week as well.  Reports he is under tremendous amount of stress recently due to family issues.  Currently visiting from [place].  Patient is a nonsmoker.  Denies family cardiac history.      Review of patient's allergies indicates:  No Known Allergies  Past Medical History:   Diagnosis Date    Anxiety disorder, unspecified     HLD (hyperlipidemia)     Sleep apnea, unspecified      History reviewed. No pertinent surgical history.  History reviewed. No pertinent family history.  Social History     Tobacco Use    Smoking status: Unknown   Substance Use Topics    Alcohol use: Not Currently    Drug use: Never     Review of Systems   Constitutional:  Negative for chills and fever.   HENT:  Negative for sore throat.    Respiratory:  Positive for shortness of breath.    Cardiovascular:  Positive for chest pain.   Gastrointestinal:  Negative for abdominal pain.   Genitourinary:  Negative for difficulty urinating and dysuria.   Musculoskeletal: Negative.    Skin: Negative.    Neurological:  Negative for weakness.   Psychiatric/Behavioral:  Negative for confusion.      Physical Exam     Initial Vitals [12/06/22 2028]   BP Pulse Resp Temp SpO2   (!) 163/96 100 15 98.6 °F (37 °C) 100 %      MAP       --         Physical Exam    Nursing note and vitals  reviewed.  Constitutional: He appears well-developed and well-nourished. He is not diaphoretic. No distress.   HENT:   Head: Normocephalic and atraumatic.   Eyes: Conjunctivae are normal. Pupils are equal, round, and reactive to light.   Neck: Neck supple.   Normal range of motion.  Cardiovascular:  Normal rate, regular rhythm, normal heart sounds and intact distal pulses.           Pulmonary/Chest: Breath sounds normal.   Abdominal: Abdomen is soft. Bowel sounds are normal. There is no abdominal tenderness.   Musculoskeletal:         General: Normal range of motion.      Cervical back: Normal range of motion and neck supple.     Neurological: He is alert. GCS score is 15. GCS eye subscore is 4. GCS verbal subscore is 5. GCS motor subscore is 6.   Skin: Skin is warm. Capillary refill takes less than 2 seconds.   Psychiatric: He has a normal mood and affect.       ED Course   Procedures  Labs Reviewed   CBC W/ AUTO DIFFERENTIAL - Abnormal; Notable for the following components:       Result Value    MPV 9.0 (*)     All other components within normal limits   COMPREHENSIVE METABOLIC PANEL   TROPONIN I   B-TYPE NATRIURETIC PEPTIDE   TROPONIN I          Imaging Results              X-Ray Chest PA And Lateral (Final result)  Result time 12/06/22 23:40:49      Final result by Torsten Bonilla MD (12/06/22 23:40:49)                   Impression:      No acute process.      Electronically signed by: Torsten Bonilla MD  Date:    12/06/2022  Time:    23:40               Narrative:    EXAMINATION:  XR CHEST PA AND LATERAL    CLINICAL HISTORY:  Chest Pain;    TECHNIQUE:  PA and lateral views of the chest were performed.    COMPARISON:  None    FINDINGS:  There is a right-sided neural stimulating device in place.  The trachea is unremarkable.  The cardiomediastinal silhouette is within normal limits.  The hilar structures are unremarkable.  There is no evidence of free air beneath the hemidiaphragms.  No pleural effusions.  There is no  evidence of a pneumothorax.  There is no evidence of pneumomediastinum.  No airspace opacity is present.  The osseous structures demonstrate degenerative changes.                                       Medications   aspirin tablet 325 mg (325 mg Oral Given 12/6/22 6607)     Medical Decision Making:   History:   Old Medical Records: I decided to obtain old medical records.  Initial Assessment:   62-year-old male presents to ED for left-sided chest tightness and shortness of breath.  Reports it started around 6:00 p.m. while helping his son who had a TBI out of a wheelchair.  States it lasted approximately 30 minutes and resolved with rest.  Differential Diagnosis:   ACS, pneumonia, anxiety  Independently Interpreted Test(s):   I have ordered and independently interpreted EKG Reading(s) - see summary below       <> Summary of EKG Reading(s): My individual interpretation sinus rhythm with a right bundle-branch block.  No old EKGs for comparison.  Does not meet STEMI criteria.  Clinical Tests:   Lab Tests: Ordered and Reviewed  Radiological Study: Ordered and Reviewed  Medical Tests: Ordered and Reviewed  ED Management:  On arrival to the ED patient is chest pain-free.  EKG does show a right bundle-branch block I am unsure if it is old as there is no previous tracings for comparison.  Chest x-ray with no cardiopulmonary abnormalities.  Patient is overall well-appearing with reassuring vitals.  Will obtain 2 troponins.  He is low risk heart score of 3 and if his 2nd troponin is normal discharge home and advised to follow-up with his cardiologist this week.  Since there is no comparison repeat EKG was ordered to assess for dynamic changes.  At time of shift change he is pending 2nd troponin repeat EKG.  Report in hand off given to Dr. Keys who will follow-up on labs and EKG.             ED Course as of 12/07/22 0108   Wed Dec 07, 2022   0044 Troponin I: <0.006 [HJ]   0045 BNP: <10 [HJ]   0045 WBC: 8.05 [HJ]      ED  Course User Index  [HJ] Michi Torres PA-C                 Clinical Impression:   Final diagnoses:  [R07.9] Chest pain        ED Disposition Condition    Discharge Stable          ED Prescriptions    None       Follow-up Information    None          Michi Torres PA-C  12/07/22 0108

## 2022-12-07 NOTE — FIRST PROVIDER EVALUATION
" Emergency Department TeleTriage Encounter Note      CHIEF COMPLAINT    Chief Complaint   Patient presents with    Chest Pain     Cp radiates down left arm. +SOB. Pt reports being in a lot of stress lately. -cardiac history        VITAL SIGNS   Initial Vitals [12/06/22 2028]   BP Pulse Resp Temp SpO2   (!) 163/96 100 15 98.6 °F (37 °C) 100 %      MAP       --            ALLERGIES    Review of patient's allergies indicates:  No Known Allergies    PROVIDER TRIAGE NOTE  This is a teletriage evaluation of a 62 y.o. male presenting to the ED with c/o "lots of stress going on."  Shooting pain to left arm and cp intermittently. "Mild pain" at present.   "But a lot of SOB" denies cough.      ROS: (-) fever, (-) rash  PE:  NAD    Initial orders will be placed and care will be transferred to an alternate provider when patient is roomed for a full evaluation. Any additional orders and the final disposition will be determined by that provider.         ORDERS  Labs Reviewed - No data to display    ED Orders (720h ago, onward)      Start Ordered     Status Ordering Provider    12/07/22 0104 12/06/22 2203  Troponin I #2  Once Timed         Ordered JOSÉ MANUEL HOOD    12/06/22 2204 12/06/22 2203  Vital signs  Every 15 min         Ordered JOSÉ MANUEL HOOD    12/06/22 2204 12/06/22 2203  Cardiac Monitoring - Adult  Continuous        Comments: Notify Physician If:    Ordered JOSÉ MANUEL HOOD    12/06/22 2204 12/06/22 2203  Pulse Oximetry Continuous  Continuous         Ordered JOSÉ MANUEL HOOD    12/06/22 2204 12/06/22 2203  Diet NPO  Diet effective now         Ordered JOSÉ MANUEL HOOD    12/06/22 2204 12/06/22 2203  Saline lock IV  Once         Ordered JOSÉ MANUEL HOOD    12/06/22 2204 12/06/22 2203  EKG 12-lead  Once        Comments: Do not perform if previously done during this visit/ triage    Ordered JOSÉ MANUEL HOOD    12/06/22 2204 12/06/22 2203  CBC auto differential  STAT         Ordered JOSÉ MANUEL HOOD    " 12/06/22 2204 12/06/22 2203  Comprehensive metabolic panel  STAT         Ordered JOSÉ MANUEL HOOD.    12/06/22 2204 12/06/22 2203  Troponin I #1  STAT         Ordered JOSÉ MANUEL HOOD.    12/06/22 2204 12/06/22 2203  B-Type natriuretic peptide (BNP)  STAT         Ordered JOSÉ MANUEL HOOD.    12/06/22 2204 12/06/22 2203  X-Ray Chest PA And Lateral  1 time imaging         Ordered SANA JOSÉ MANUEL DYLAN    12/06/22 2031 12/06/22 2030  EKG 12-lead  Once         Completed by VALERIE COTTO on 12/6/2022 at  8:34 PM WATSON WILLIAM JR              Virtual Visit Note: The provider triage portion of this emergency department evaluation and documentation was performed via [email or web address], a HIPAA-compliant telemedicine application, in concert with a tele-presenter in the room. A face to face patient evaluation with one of my colleagues will occur once the patient is placed in an emergency department room.      DISCLAIMER: This note was prepared with [employer] voice recognition transcription software. Garbled syntax, mangled pronouns, and other bizarre constructions may be attributed to that software system.

## 2022-12-07 NOTE — PROVIDER PROGRESS NOTES - EMERGENCY DEPT.
Signout Note    I received signout from the previous provider.     Chief complaint:  Chest Pain (Cp radiates down left arm. +SOB. Pt reports being in a lot of stress lately. -cardiac history )      Pertinent history &exam:  Mannie Cole is a 62 y.o. male who presented to emergency department with complaint of chest pain.  Initial EKG without STEMI.  Does have a right bundle-branch block.  Initial troponin negative.  Signed out pending repeat EKG and 2nd troponin for likely discharge home.    Vitals:    12/07/22 0219   BP: (!) 148/89   Pulse: 64   Resp: 16   Temp: 98 °F (36.7 °C)       Imaging Studies:    X-Ray Chest PA And Lateral   Final Result      No acute process.         Electronically signed by: Torsten Bonilla MD   Date:    12/06/2022   Time:    23:40          Medications Given:  Medications   aspirin tablet 325 mg (325 mg Oral Given 12/6/22 3270)       Pending Items/ MDM:  62 y.o. male with chest pain.  Second troponin negative.  Repeat EKG stable.  Discharged home in stable condition.    Diagnostic Impression:    1. Chest pain         ED Disposition Condition    Discharge Stable          ED Prescriptions    None       Follow-up Information       Follow up With Specialties Details Why Contact Info    Your primary care doctor  Schedule an appointment as soon as possible for a visit               Patient understands the plan and is in agreement, verbalized understanding, questions answered    Bonnie Keys MD  Emergency Medicine

## 2022-12-07 NOTE — ED TRIAGE NOTES
Mannie Cole, a 62 y.o. male presents to the ED w/ complaint of mid sternal CP that radiates down left arm. +SOB. Pt states undergoing a lot of stress lately, as son is undergoing oxygen therapy for TBI. Denies cardiac history. Reports taking a cholesterol medication      Triage note:  Chief Complaint   Patient presents with    Chest Pain     Cp radiates down left arm. +SOB. Pt reports being in a lot of stress lately. -cardiac history      Review of patient's allergies indicates:  No Known Allergies  No past medical history on file.

## 2022-12-08 NOTE — ED PROVIDER NOTES
Encounter Date: 12/6/2022       History     Chief Complaint   Patient presents with    Chest Pain     Cp radiates down left arm. +SOB. Pt reports being in a lot of stress lately. -cardiac history      HPI  Review of patient's allergies indicates:  No Known Allergies  Past Medical History:   Diagnosis Date    Anxiety disorder, unspecified     HLD (hyperlipidemia)     Sleep apnea, unspecified      History reviewed. No pertinent surgical history.  History reviewed. No pertinent family history.  Social History     Tobacco Use    Smoking status: Unknown   Substance Use Topics    Alcohol use: Not Currently    Drug use: Never     Review of Systems    Physical Exam     Initial Vitals [12/06/22 2028]   BP Pulse Resp Temp SpO2   (!) 163/96 100 15 98.6 °F (37 °C) 100 %      MAP       --         Physical Exam    ED Course   Procedures  Labs Reviewed   CBC W/ AUTO DIFFERENTIAL - Abnormal; Notable for the following components:       Result Value    MPV 9.0 (*)     All other components within normal limits   COMPREHENSIVE METABOLIC PANEL   TROPONIN I   TROPONIN I   B-TYPE NATRIURETIC PEPTIDE        ECG Results              Repeat EKG 12-lead (Final result)  Result time 12/07/22 19:04:45      Final result by Interface, Lab In [facility] (12/07/22 19:04:45)                   Narrative:    Test Reason : R07.9,    Vent. Rate : 062 BPM     Atrial Rate : 062 BPM     P-R Int : 170 ms          QRS Dur : 156 ms      QT Int : 442 ms       P-R-T Axes : 036 020 036 degrees     QTc Int : 448 ms    Normal sinus rhythm  Right bundle branch block  Abnormal ECG  When compared with ECG of 06-DEC-2022 20:30,  Vent. rate has decreased BY  33 BPM  Confirmed by Claus Penny MD ([id number]) on 12/7/2022 7:04:38 PM    Referred By: [email or web address]   SELF           Confirmed By:Claus Penny MD                                     EKG 12-lead (Final result)  Result time 12/07/22 18:44:44      Final result by Interface, Lab In [facility] (12/07/22 18:44:44)                    Narrative:    Test Reason : R07.9,    Vent. Rate : 095 BPM     Atrial Rate : 095 BPM     P-R Int : 150 ms          QRS Dur : 148 ms      QT Int : 386 ms       P-R-T Axes : 031 064 035 degrees     QTc Int : 485 ms    Normal sinus rhythm  Right bundle branch block  Abnormal ECG  No previous ECGs available  Confirmed by Claus Penny MD ([id number]) on 12/7/2022 6:44:33 PM    Referred By: [email or web address]   SELF           Confirmed By:Claus Penny MD                                  Imaging Results              X-Ray Chest PA And Lateral (Final result)  Result time 12/06/22 23:40:49      Final result by Torsten Bonilla MD (12/06/22 23:40:49)                   Impression:      No acute process.      Electronically signed by: Torsten Bonilla MD  Date:    12/06/2022  Time:    23:40               Narrative:    EXAMINATION:  XR CHEST PA AND LATERAL    CLINICAL HISTORY:  Chest Pain;    TECHNIQUE:  PA and lateral views of the chest were performed.    COMPARISON:  None    FINDINGS:  There is a right-sided neural stimulating device in place.  The trachea is unremarkable.  The cardiomediastinal silhouette is within normal limits.  The hilar structures are unremarkable.  There is no evidence of free air beneath the hemidiaphragms.  No pleural effusions.  There is no evidence of a pneumothorax.  There is no evidence of pneumomediastinum.  No airspace opacity is present.  The osseous structures demonstrate degenerative changes.                                       Medications   aspirin tablet 325 mg (325 mg Oral Given 12/6/22 9879)                Attending Attestation:     Physician Attestation Statement for NP/PA:   I discussed this assessment and plan of this patient with the NP/PA, but I did not personally examine the patient. The face to face encounter was performed by the NP/PA.            ED Course as of 12/08/22 1438   Wed Dec 07, 2022   0044 Troponin I: <0.006 [HJ]   0045 BNP: <10 [HJ]   0045 WBC: 8.05 [HJ]      ED Course User Index  [HJ] Hae  ANASTASIA Torres                 Clinical Impression:   Final diagnoses:  [R07.9] Chest pain (Primary)        ED Disposition Condition    Discharge Stable          ED Prescriptions    None       Follow-up Information       Follow up With Specialties Details Why Contact Info    Your primary care doctor  Schedule an appointment as soon as possible for a visit                Naun Taylor III, MD  12/08/22 6579

## 2022-12-20 ENCOUNTER — OFFICE VISIT (OUTPATIENT)
Dept: FAMILY MEDICINE | Facility: CLINIC | Age: 62
End: 2022-12-20
Payer: COMMERCIAL

## 2022-12-20 VITALS
HEART RATE: 84 BPM | TEMPERATURE: 97.8 F | SYSTOLIC BLOOD PRESSURE: 136 MMHG | HEIGHT: 72 IN | BODY MASS INDEX: 29.66 KG/M2 | RESPIRATION RATE: 20 BRPM | OXYGEN SATURATION: 100 % | WEIGHT: 219 LBS | DIASTOLIC BLOOD PRESSURE: 88 MMHG

## 2022-12-20 DIAGNOSIS — F40.10 SOCIAL PHOBIA: ICD-10-CM

## 2022-12-20 DIAGNOSIS — Z12.5 SCREENING FOR PROSTATE CANCER: ICD-10-CM

## 2022-12-20 DIAGNOSIS — F41.1 GAD (GENERALIZED ANXIETY DISORDER): ICD-10-CM

## 2022-12-20 DIAGNOSIS — E78.5 HYPERLIPIDEMIA LDL GOAL <130: ICD-10-CM

## 2022-12-20 DIAGNOSIS — I45.10 RIGHT BUNDLE BRANCH BLOCK (RBBB): ICD-10-CM

## 2022-12-20 DIAGNOSIS — Z63.6 CAREGIVER STRESS: ICD-10-CM

## 2022-12-20 DIAGNOSIS — Z00.00 ENCOUNTER FOR ANNUAL PHYSICAL EXAM: Primary | ICD-10-CM

## 2022-12-20 DIAGNOSIS — R07.89 ATYPICAL CHEST PAIN: ICD-10-CM

## 2022-12-20 LAB
ERYTHROCYTE [DISTWIDTH] IN BLOOD BY AUTOMATED COUNT: 13.4 % (ref 10–15)
HCT VFR BLD AUTO: 44.2 % (ref 40–53)
HGB BLD-MCNC: 15.7 G/DL (ref 13.3–17.7)
MCH RBC QN AUTO: 29.8 PG (ref 26.5–33)
MCHC RBC AUTO-ENTMCNC: 35.5 G/DL (ref 31.5–36.5)
MCV RBC AUTO: 84 FL (ref 78–100)
PLATELET # BLD AUTO: 229 10E3/UL (ref 150–450)
RBC # BLD AUTO: 5.27 10E6/UL (ref 4.4–5.9)
WBC # BLD AUTO: 7.2 10E3/UL (ref 4–11)

## 2022-12-20 PROCEDURE — 36415 COLL VENOUS BLD VENIPUNCTURE: CPT | Performed by: FAMILY MEDICINE

## 2022-12-20 PROCEDURE — 93000 ELECTROCARDIOGRAM COMPLETE: CPT | Mod: 76 | Performed by: FAMILY MEDICINE

## 2022-12-20 PROCEDURE — 84443 ASSAY THYROID STIM HORMONE: CPT | Performed by: FAMILY MEDICINE

## 2022-12-20 PROCEDURE — 85027 COMPLETE CBC AUTOMATED: CPT | Performed by: FAMILY MEDICINE

## 2022-12-20 PROCEDURE — 99396 PREV VISIT EST AGE 40-64: CPT | Performed by: FAMILY MEDICINE

## 2022-12-20 PROCEDURE — 80053 COMPREHEN METABOLIC PANEL: CPT | Performed by: FAMILY MEDICINE

## 2022-12-20 PROCEDURE — G0103 PSA SCREENING: HCPCS | Performed by: FAMILY MEDICINE

## 2022-12-20 PROCEDURE — 80061 LIPID PANEL: CPT | Performed by: FAMILY MEDICINE

## 2022-12-20 PROCEDURE — 99214 OFFICE O/P EST MOD 30 MIN: CPT | Mod: 25 | Performed by: FAMILY MEDICINE

## 2022-12-20 RX ORDER — VENLAFAXINE HYDROCHLORIDE 150 MG/1
150 CAPSULE, EXTENDED RELEASE ORAL DAILY
Qty: 90 CAPSULE | Refills: 3 | Status: SHIPPED | OUTPATIENT
Start: 2022-12-20 | End: 2023-10-25

## 2022-12-20 RX ORDER — ATORVASTATIN CALCIUM 40 MG/1
40 TABLET, FILM COATED ORAL DAILY
Qty: 90 TABLET | Refills: 3 | Status: SHIPPED | OUTPATIENT
Start: 2022-12-20 | End: 2023-10-25

## 2022-12-20 SDOH — SOCIAL STABILITY - SOCIAL INSECURITY: DEPENDENT RELATIVE NEEDING CARE AT HOME: Z63.6

## 2022-12-20 ASSESSMENT — ENCOUNTER SYMPTOMS
HEADACHES: 0
ABDOMINAL PAIN: 1
COUGH: 0
DIARRHEA: 1
FREQUENCY: 0
SORE THROAT: 0
CONSTIPATION: 0
HEMATOCHEZIA: 0
HEARTBURN: 1
NERVOUS/ANXIOUS: 1
ARTHRALGIAS: 0
PALPITATIONS: 0
WEAKNESS: 0
EYE PAIN: 0
DYSURIA: 0
MYALGIAS: 0
PARESTHESIAS: 0
DIZZINESS: 0
HEMATURIA: 0
NAUSEA: 1
JOINT SWELLING: 0
SHORTNESS OF BREATH: 1
CHILLS: 0
FEVER: 0

## 2022-12-20 ASSESSMENT — PATIENT HEALTH QUESTIONNAIRE - PHQ9
SUM OF ALL RESPONSES TO PHQ QUESTIONS 1-9: 10
10. IF YOU CHECKED OFF ANY PROBLEMS, HOW DIFFICULT HAVE THESE PROBLEMS MADE IT FOR YOU TO DO YOUR WORK, TAKE CARE OF THINGS AT HOME, OR GET ALONG WITH OTHER PEOPLE: EXTREMELY DIFFICULT
SUM OF ALL RESPONSES TO PHQ QUESTIONS 1-9: 10

## 2022-12-20 ASSESSMENT — PAIN SCALES - GENERAL: PAINLEVEL: NO PAIN (0)

## 2022-12-20 NOTE — LETTER
To whom it may concern.      Juni Jain      1960            Patient is seen in the clinic 12/20/2022. He has underline anxiety. He is going through rough time due to health of his son. He need to focus on his mental health and being available for his son care, he requested some time off, which is reasonable.Advised to see therapist for ongoing care regarding his mental health. Please feel free to reach us if any additional question.                    Sincerely,         Ru Davis MD    12/20/2022

## 2022-12-20 NOTE — PROGRESS NOTES
SUBJECTIVE:   CC: Fritz is an 62 year old who presents for preventative health visit.   Patient has been advised of split billing requirements and indicates understanding: Yes  Healthy Habits:     Getting at least 3 servings of Calcium per day:  NO    Bi-annual eye exam:  Yes    Dental care twice a year:  Yes    Sleep apnea or symptoms of sleep apnea:  Sleep apnea    Diet:  Regular (no restrictions)    Frequency of exercise:  1 day/week    Duration of exercise:  Less than 15 minutes    Taking medications regularly:  Yes    Medication side effects:  None    PHQ-2 Total Score: 4    Additional concerns today:  Yes  Patient is a very pleasant patient well-known to me he has underlying history of anxiety along with cholesterol issue.  The last year or so he has been dealing with increased anxiety due to his son health who has been under continuous care.   He has recent hospitalization or ER visits secondary to some atypical chest pain.  And was advised to seek medical care for follow-up of cardiology.  Denies any current chest pains no shortness of breath.  His anxiety symptoms are stable but they are fluctuating due to underlying issues and now with continuous care of his son that cause his anxiety to be worse.  Patient is planning to take some time off to be available for his own mental health emotional stress .        Asking for a note for work - would like to take a leave due to   mental/emotional stress     Today's PHQ-2 Score:   PHQ-2 ( 1999 [employer]) 12/20/2022   Q1: Little interest or pleasure in doing things 2   Q2: Feeling down, depressed or hopeless 2   PHQ-2 Score 4   PHQ-2 Total Score (12-17 Years)- Positive if 3 or more points; Administer PHQ-A if positive -   Q1: Little interest or pleasure in doing things More than half the days   Q2: Feeling down, depressed or hopeless More than half the days   PHQ-2 Score 4           Social History     Tobacco Use     Smoking status: Never     Smokeless tobacco: Never      "Tobacco comments:     Do not smoke   Substance Use Topics     Alcohol use: Yes     Comment: Weekends         Alcohol Use 12/20/2022   Prescreen: >3 drinks/day or >7 drinks/week? No   Prescreen: >3 drinks/day or >7 drinks/week? -   AUDIT SCORE  -       Last PSA:   PSA   Date Value Ref Range Status   09/29/2020 3.26 0 - 4 ug/L Final     Comment:     Assay Method:  Chemiluminescence using Siemens Vista analyzer     Prostate Specific Antigen Screen   Date Value Ref Range Status   10/08/2021 2.70 0.00 - 4.00 ug/L Final         Reviewed and updated as needed this visit by clinical staff   Tobacco  Allergies  Meds              Reviewed and updated as needed this visit by Provider                     Review of Systems   Constitutional: Negative for chills and fever.   HENT: Negative for congestion, ear pain, hearing loss and sore throat.    Eyes: Negative for pain and visual disturbance.   Respiratory: Positive for shortness of breath. Negative for cough.    Cardiovascular: Negative for chest pain, palpitations and peripheral edema.   Gastrointestinal: Positive for abdominal pain, diarrhea, heartburn and nausea. Negative for constipation and hematochezia.   Genitourinary: Negative for dysuria, frequency, genital sores, hematuria, impotence, penile discharge and urgency.   Musculoskeletal: Negative for arthralgias, joint swelling and myalgias.   Skin: Negative for rash.   Neurological: Negative for dizziness, weakness, headaches and paresthesias.   Psychiatric/Behavioral: Positive for mood changes. The patient is nervous/anxious.          OBJECTIVE:   BP (!) 150/94   Pulse 84   Temp 97.8  F (36.6  C) (Tympanic)   Resp 20   Ht 1.84 m (6' 0.44\")   Wt 99.3 kg (219 lb)   SpO2 100%   BMI 29.34 kg/m      Physical Exam  GENERAL: healthy, alert and no distress  EYES: Eyes grossly normal to inspection, PERRL and conjunctivae and sclerae normal  HENT: ear canals and TM's normal, nose and mouth without ulcers or " lesions  NECK: no adenopathy, no asymmetry, masses, or scars and thyroid normal to palpation  RESP: lungs clear to auscultation - no rales, rhonchi or wheezes  CV: regular rate and rhythm, normal S1 S2, no S3 or S4, no murmur, click or rub, no peripheral edema and peripheral pulses strong  ABDOMEN: soft, nontender, no hepatosplenomegaly, no masses and bowel sounds normal  MS: no gross musculoskeletal defects noted, no edema  SKIN: no suspicious lesions or rashes  NEURO: Normal strength and tone, mentation intact and speech normal  PSYCH: Seems anxious increase anxiety t    Diagnostic Test Results:  Labs reviewed in Epic    ASSESSMENT/PLAN:   Juni was seen today for physical.    Diagnoses and all orders for this visit:    Encounter for annual physical exam  -     Prostate Specific Antigen Screen; Future  -     Comprehensive metabolic panel; Future  -     Lipid Profile; Future  -     TSH with free T4 reflex; Future  -     CBC with platelets; Future  -     Prostate Specific Antigen Screen  -     Comprehensive metabolic panel  -     Lipid Profile  -     TSH with free T4 reflex  -     CBC with platelets    Atypical chest pain  -     Comprehensive metabolic panel; Future  -     Lipid Profile; Future  -     EKG 12-lead complete w/read - Clinics  -     EKG 12-lead complete w/read - Clinics  -     Adult Cardiology Eval  Referral; Future  -     Comprehensive metabolic panel  -     Lipid Profile    Social phobia  -     venlafaxine (EFFEXOR XR) 150 MG 24 hr capsule; Take 1 capsule (150 mg) by mouth daily    ELVIS (generalized anxiety disorder)  -     venlafaxine (EFFEXOR XR) 150 MG 24 hr capsule; Take 1 capsule (150 mg) by mouth daily  -     Adult Mental Health  Referral; Future    Hyperlipidemia LDL goal <130  -     atorvastatin (LIPITOR) 40 MG tablet; Take 1 tablet (40 mg) by mouth daily  -     Comprehensive metabolic panel; Future  -     Lipid Profile; Future  -     Comprehensive metabolic panel  -      Lipid Profile    Screening for prostate cancer  -     Prostate Specific Antigen Screen; Future  -     Prostate Specific Antigen Screen    Caregiver stress  We discussed about different aspects of his underlying anxiety we discussed about his current situation.  He is planning to take some time off to focus on his own health and stress and increase anxiety and possible managing this better.  Which is appropriate.  We will keep him on Effexor but I suggested he should see some counselor for evaluation.  If any further help is needed he will let us know.  Right bundle branch block (RBBB)  -     Adult Cardiology Eval  Referral; Future  EKG done which indicate right bundle branch block it was similar to what he had in [place].  Suggested for further evaluation so reasonable to see a cardiac referral.  Which is given.      Patient has been advised of split billing requirements and indicates understanding: Yes      COUNSELING:   Reviewed preventive health counseling, as reflected in patient instructions       Regular exercise       Healthy diet/nutrition        He reports that he has never smoked. He has never used smokeless tobacco.            Ru Davis MD  [facility]

## 2022-12-21 ENCOUNTER — OFFICE VISIT (OUTPATIENT)
Dept: CARDIOLOGY | Facility: CLINIC | Age: 62
End: 2022-12-21
Attending: FAMILY MEDICINE
Payer: COMMERCIAL

## 2022-12-21 VITALS
DIASTOLIC BLOOD PRESSURE: 96 MMHG | BODY MASS INDEX: 29.29 KG/M2 | HEIGHT: 73 IN | SYSTOLIC BLOOD PRESSURE: 151 MMHG | HEART RATE: 77 BPM | WEIGHT: 221 LBS

## 2022-12-21 DIAGNOSIS — I45.10 RIGHT BUNDLE BRANCH BLOCK (RBBB): ICD-10-CM

## 2022-12-21 DIAGNOSIS — R07.89 ATYPICAL CHEST PAIN: ICD-10-CM

## 2022-12-21 DIAGNOSIS — I34.0 NONRHEUMATIC MITRAL VALVE REGURGITATION: Primary | ICD-10-CM

## 2022-12-21 PROBLEM — R07.9 CHEST PAIN: Status: ACTIVE | Noted: 2022-12-21

## 2022-12-21 LAB
ALBUMIN SERPL BCG-MCNC: 4.7 G/DL (ref 3.5–5.2)
ALP SERPL-CCNC: 82 U/L (ref 40–129)
ALT SERPL W P-5'-P-CCNC: 77 U/L (ref 10–50)
ANION GAP SERPL CALCULATED.3IONS-SCNC: 18 MMOL/L (ref 7–15)
AST SERPL W P-5'-P-CCNC: 35 U/L (ref 10–50)
BILIRUB SERPL-MCNC: 0.9 MG/DL
BUN SERPL-MCNC: 18.1 MG/DL (ref 8–23)
CALCIUM SERPL-MCNC: 9.4 MG/DL (ref 8.8–10.2)
CHLORIDE SERPL-SCNC: 102 MMOL/L (ref 98–107)
CHOLEST SERPL-MCNC: 211 MG/DL
CREAT SERPL-MCNC: 1.14 MG/DL (ref 0.67–1.17)
DEPRECATED HCO3 PLAS-SCNC: 23 MMOL/L (ref 22–29)
GFR SERPL CREATININE-BSD FRML MDRD: 74 ML/MIN/1.73M2
GLUCOSE SERPL-MCNC: 88 MG/DL (ref 70–99)
HDLC SERPL-MCNC: 52 MG/DL
LDLC SERPL CALC-MCNC: 135 MG/DL
NONHDLC SERPL-MCNC: 159 MG/DL
POTASSIUM SERPL-SCNC: 4.4 MMOL/L (ref 3.4–5.3)
PROT SERPL-MCNC: 7.1 G/DL (ref 6.4–8.3)
PSA SERPL-MCNC: 2.28 NG/ML (ref 0–4.5)
SODIUM SERPL-SCNC: 143 MMOL/L (ref 136–145)
TRIGL SERPL-MCNC: 121 MG/DL
TSH SERPL DL<=0.005 MIU/L-ACNC: 2.08 UIU/ML (ref 0.3–4.2)

## 2022-12-21 PROCEDURE — 99204 OFFICE O/P NEW MOD 45 MIN: CPT | Performed by: INTERNAL MEDICINE

## 2022-12-21 NOTE — LETTER
12/21/2022    Ru Davis MD  [address]  [place] [address]    RE: Juni Arandawm       Dear Colleague,     I had the pleasure of seeing Juni Jain in the [facility].  HPI and Plan:     Juni Fernández who had chest pain while in [place].  Short sharp stabbing chest pain in the upper left and right chest area.  Previously frequent walking and some running.  History of sleep apnea.     No family history of CAD.  Lipids elevated  Currently on lipitor 40 mg daily.  Ldl 135, HDL 52.      He presented to emergency room in [place] with atypical chest pain.  EKG showed a right bundle branch block which is new for him.  EKG was normal back in 2013.  He had a stress test 2019 nuclear stress test which was negative for ischemia and normal ejection fraction.  He had a stress echocardiogram back in 2013 which showed mild mitral regurgitation or an echocardiogram.    I reviewed his EKG.  I reviewed old stress testing.  I reviewed ER visit chest x-ray from hospital in [place].    Recommend stress echocardiogram to both evaluate degree of mitral regurgitation as there is a mild systolic murmur at the left apex as well as for ruling out significant ischemic heart disease with multiple risk factors and atypical chest pain.    Today's clinic visit entailed:  Review of external notes as documented elsewhere in note  Review of the result(s) of each unique test - EKG, stress test  The following tests were independently interpreted by me as noted in my documentation: EKG  Ordering of each unique test  40  minutes spent on the date of the encounter doing chart review, review of outside records, review of test results, interpretation of tests, patient visit and documentation   Provider  Link to [facility] Help Grid     The level of medical decision making during this visit was of moderate complexity.      No orders of the defined types were placed in this encounter.    No orders of the  defined types were placed in this encounter.    There are no discontinued medications.      Encounter Diagnoses   Name Primary?     Atypical chest pain      Right bundle branch block (RBBB)        CURRENT MEDICATIONS:  Current Outpatient Medications   Medication Sig Dispense Refill     atorvastatin (LIPITOR) 40 MG tablet Take 1 tablet (40 mg) by mouth daily 90 tablet 3     Esomeprazole Magnesium (NEXIUM PO) Take 40 mg by mouth every morning (before breakfast)       omega-3 fatty acids 1200 MG capsule Take 2 capsules by mouth daily. 180 capsule 12     venlafaxine (EFFEXOR XR) 150 MG 24 hr capsule Take 1 capsule (150 mg) by mouth daily 90 capsule 3     VITAMIN D, CHOLECALCIFEROL, PO Take 2-4 tablets by mouth daily.       propranolol (INDERAL) 10 MG tablet TAKE 1-2 TABLETS BY MOUTH 90 MINUTES BEFORE PRESENTATION (Patient not taking: Reported on 12/21/2022) 45 tablet 1       ALLERGIES     Allergies   Allergen Reactions     No Known Drug Allergies        PAST MEDICAL HISTORY:  Past Medical History:   Diagnosis Date     Coronary artery disease 04/2013    some angina here and there. April 2013 at [facility]     Depressive disorder     Due to tragedy of son, Daniel LEAL (generalized anxiety disorder) 05/13/2016     Gastroesophageal reflux disease     somewhat but stopped taking Nexium     Heart disease April 2013     Hyperlipidemia LDL goal <130 10/31/2010     Obstructive sleep apnea 03/02/2017    most recent sleep study from [facility], No CPAP     Sleep apnea 1994    Chronic issue for over 15+ years       PAST SURGICAL HISTORY:  Past Surgical History:   Procedure Laterality Date     COLONOSCOPY  2013    Going June 9, 2016     COSMETIC SURGERY  1981    Rhinoplasty/chin implant-Deviated Septum     ENDOSCOPY DRUG INDUCED SLEEP Bilateral 7/19/2017    Procedure: ENDOSCOPY DRUG INDUCED SLEEP;  Drug Induced Sleep Endoscopy;  Surgeon: Liliana Paige MD;  Location: [facility] OR     ENT SURGERY  over the years     CPAP never worked for me. Uncomfortable.     IMPLANT GENERATOR STIMULATOR (LOCATION) Right 9/6/2017    Procedure: IMPLANT GENERATOR STIMULATOR (LOCATION);  Right Hypoglossal Nerve (Inspire) Implantation with Facial Nerve Monitoring;  Surgeon: Liliana Paige MD;  Location: [facility] OR       FAMILY HISTORY:  Family History   Problem Relation Age of Onset     Alzheimer Disease Mother      Dementia Mother         Dementia     C.A.D. Father 86        Bypass 5/2011     Cancer Father         Esophageal cancer     Hypertension Father      Hyperlipidemia Father      Thyroid Disease Brother      Thyroid Disease Brother         i think underactive/very tired     Cancer Maternal Grandmother         Stomach Cancer     Stomach Problem Maternal Grandmother         colon/stomach cancer     Colon Cancer Maternal Grandmother      Cancer Maternal Grandfather         Lung cancer     Other Cancer Maternal Grandfather         Lung     Thyroid Disease Brother         ,     Cancer Paternal Grandfather         Prostrate cancer     Prostate Cancer Paternal Grandfather        SOCIAL HISTORY:  Social History     Socioeconomic History     Marital status:      Spouse name: sera saldivar     Number of children: 3     Years of education: 16     Highest education level: None   Occupational History     Occupation: sales     Employer: NONE    Tobacco Use     Smoking status: Never     Smokeless tobacco: Never     Tobacco comments:     Do not smoke   Substance and Sexual Activity     Alcohol use: Yes     Comment: Weekends     Drug use: No     Sexual activity: Yes     Partners: Female     Birth control/protection: Male Surgical     Comment: Vasectomy   Other Topics Concern      Service No     Blood Transfusions No     Caffeine Concern No     Occupational Exposure No     Hobby Hazards No     Sleep Concern No     Stress Concern Yes     Weight Concern Yes     Special Diet Yes     Comment: wt watchers     Back Care No     Exercise Yes      "Bike Helmet No     Seat Belt Yes     Self-Exams No     Parent/sibling w/ CABG, MI or angioplasty before 65F 55M? Yes     Comment: father in his 80's       Review of Systems:  Skin:        Eyes:       ENT:       Respiratory:       Cardiovascular:       Gastroenterology:      Genitourinary:       Musculoskeletal:       Neurologic:       Psychiatric:       Heme/Lymph/Imm:       Endocrine:         Physical Exam:  Vitals: BP (!) 151/96 (BP Location: Left arm, Cuff Size: Adult Large)   Pulse 77   Ht 1.854 m (6' 1\")   Wt 100.2 kg (221 lb)   BMI 29.16 kg/m      Constitutional:           Skin:             Head:           Eyes:           Lymph:      ENT:           Neck:           Respiratory:            Cardiac:                                                           GI:           Extremities and Muscular Skeletal:                 Neurological:           Psych:         Recent Lab Results:  LIPID RESULTS:  Lab Results   Component Value Date    CHOL 211 (H) 12/20/2022    CHOL 200 (H) 09/29/2020    HDL 52 12/20/2022    HDL 37 (L) 09/29/2020     (H) 12/20/2022     (H) 09/29/2020    TRIG 121 12/20/2022    TRIG 170 (H) 09/29/2020    CHOLHDLRATIO 4.3 05/22/2015       LIVER ENZYME RESULTS:  Lab Results   Component Value Date    AST 35 12/20/2022    AST 21 09/29/2020    ALT 77 (H) 12/20/2022    ALT 49 09/29/2020       CBC RESULTS:  Lab Results   Component Value Date    WBC 7.2 12/20/2022    WBC 6.7 07/21/2017    RBC 5.27 12/20/2022    RBC 5.36 07/21/2017    HGB 15.7 12/20/2022    HGB 15.8 07/21/2017    HCT 44.2 12/20/2022    HCT 45.4 07/21/2017    MCV 84 12/20/2022    MCV 85 07/21/2017    MCH 29.8 12/20/2022    MCH 29.5 07/21/2017    MCHC 35.5 12/20/2022    MCHC 34.8 07/21/2017    RDW 13.4 12/20/2022    RDW 13.0 07/21/2017     12/20/2022     07/21/2017       BMP RESULTS:  Lab Results   Component Value Date     12/20/2022     09/29/2020    POTASSIUM 4.4 12/20/2022    POTASSIUM 4.2 " 10/08/2021    POTASSIUM 4.5 09/29/2020    CHLORIDE 102 12/20/2022    CHLORIDE 105 10/08/2021    CHLORIDE 105 09/29/2020    CO2 23 12/20/2022    CO2 22 10/08/2021    CO2 26 09/29/2020    ANIONGAP 18 (H) 12/20/2022    ANIONGAP 9 10/08/2021    ANIONGAP 7 09/29/2020    GLC 88 12/20/2022    GLC 97 10/08/2021    GLC 95 09/29/2020    BUN 18.1 12/20/2022    BUN 17 10/08/2021    BUN 16 09/29/2020    CR 1.14 12/20/2022    CR 1.14 09/29/2020    GFRESTIMATED 74 12/20/2022    GFRESTIMATED 71 09/29/2020    GFRESTBLACK 82 09/29/2020    MADELINE 9.4 12/20/2022    MADELINE 9.4 09/29/2020        A1C RESULTS:  Lab Results   Component Value Date    A1C 5.4 09/27/2019       INR RESULTS:  No results found for: INR        CC  Ru Davis MD  [address] [place] [address]    Thank you for allowing me to participate in the care of your patient.      Sincerely,     KITA MONTES MD     [facility]

## 2022-12-21 NOTE — PATIENT INSTRUCTIONS
Your blood pressure was elevated at your appointment today.  Elevated blood pressure can increase your risk of a heart attack, stroke and heart failure.  For this reason, we feel it is important to monitor your blood pressure closely.  If you have access to a home blood pressure monitor or are able to check your blood pressure at a local pharmacy in the next week we would like you to do so and call our clinic with those readings. Please call [phone] (Shruthi) and leave a message with your name, date of birth, blood pressure reading, date and location it was completed. If your blood pressure remains elevated your care team will be notified.  We appreciate being a part of your healthcare team and look forward to hearing from you soon.

## 2022-12-29 ENCOUNTER — TELEPHONE (OUTPATIENT)
Dept: CARDIOLOGY | Facility: CLINIC | Age: 62
End: 2022-12-29

## 2022-12-29 ENCOUNTER — MYC MEDICAL ADVICE (OUTPATIENT)
Dept: CARDIOLOGY | Facility: CLINIC | Age: 62
End: 2022-12-29

## 2022-12-29 ENCOUNTER — HOSPITAL ENCOUNTER (OUTPATIENT)
Dept: CARDIOLOGY | Facility: CLINIC | Age: 62
Discharge: HOME | End: 2022-12-29
Attending: INTERNAL MEDICINE | Admitting: INTERNAL MEDICINE
Payer: COMMERCIAL

## 2022-12-29 DIAGNOSIS — I45.10 RIGHT BUNDLE BRANCH BLOCK (RBBB): ICD-10-CM

## 2022-12-29 DIAGNOSIS — R07.89 ATYPICAL CHEST PAIN: ICD-10-CM

## 2022-12-29 DIAGNOSIS — I34.0 NONRHEUMATIC MITRAL VALVE REGURGITATION: ICD-10-CM

## 2022-12-29 PROCEDURE — 93350 STRESS TTE ONLY: CPT | Mod: 26 | Performed by: INTERNAL MEDICINE

## 2022-12-29 PROCEDURE — 93325 DOPPLER ECHO COLOR FLOW MAPG: CPT | Mod: 26 | Performed by: INTERNAL MEDICINE

## 2022-12-29 PROCEDURE — 93325 DOPPLER ECHO COLOR FLOW MAPG: CPT | Mod: TC

## 2022-12-29 PROCEDURE — 93017 CV STRESS TEST TRACING ONLY: CPT

## 2022-12-29 PROCEDURE — 93016 CV STRESS TEST SUPVJ ONLY: CPT | Performed by: INTERNAL MEDICINE

## 2022-12-29 PROCEDURE — 93018 CV STRESS TEST I&R ONLY: CPT | Performed by: INTERNAL MEDICINE

## 2022-12-29 PROCEDURE — 93321 DOPPLER ECHO F-UP/LMTD STD: CPT | Mod: 26 | Performed by: INTERNAL MEDICINE

## 2022-12-29 PROCEDURE — 93350 STRESS TTE ONLY: CPT | Mod: TC

## 2022-12-29 NOTE — TELEPHONE ENCOUNTER
Stress echo results noted 12/29/22:    Interpretation Summary  The patient exhibited no chest pain during exercise.  The Duke treadmill score was low risk ( >5 Duke score) suggesting low  likelihood of obstructive CAD.  This was a normal stress echocardiogram with no evidence of stress-induced  ischemia.  Stress images were obtained when HR was less than 100 bpms. Sensitivity may be  reduced.    Dr. Baird ordered exercise stress echo to look for ischemia and also to look for any mitral regurgitation. Mitral valve is not commented on in the report. Will route to Dr. Baird for review.

## 2023-01-03 ENCOUNTER — MYC MEDICAL ADVICE (OUTPATIENT)
Dept: FAMILY MEDICINE | Facility: CLINIC | Age: 63
End: 2023-01-03

## 2023-01-04 NOTE — TELEPHONE ENCOUNTER
Forms/Letter Request    Type of form/letter: [employer]    Have you been seen for this request: No    Do we have the form/letter: Yes:    When is form/letter needed by: when able    How would you like the form/letter returned: Fax    Placed in red folder, put on Dr Davis's desk.    Teressa GOODE    [facility]

## 2023-01-04 NOTE — TELEPHONE ENCOUNTER
Please see mychart from patient and advise appropriate course of action.        Rhonda John RN  [facility] Triage Nurse

## 2023-01-05 ASSESSMENT — ANXIETY QUESTIONNAIRES
5. BEING SO RESTLESS THAT IT IS HARD TO SIT STILL: MORE THAN HALF THE DAYS
8. IF YOU CHECKED OFF ANY PROBLEMS, HOW DIFFICULT HAVE THESE MADE IT FOR YOU TO DO YOUR WORK, TAKE CARE OF THINGS AT HOME, OR GET ALONG WITH OTHER PEOPLE?: EXTREMELY DIFFICULT
3. WORRYING TOO MUCH ABOUT DIFFERENT THINGS: NEARLY EVERY DAY
GAD7 TOTAL SCORE: 20
6. BECOMING EASILY ANNOYED OR IRRITABLE: NEARLY EVERY DAY
4. TROUBLE RELAXING: NEARLY EVERY DAY
1. FEELING NERVOUS, ANXIOUS, OR ON EDGE: NEARLY EVERY DAY
GAD7 TOTAL SCORE: 20
IF YOU CHECKED OFF ANY PROBLEMS ON THIS QUESTIONNAIRE, HOW DIFFICULT HAVE THESE PROBLEMS MADE IT FOR YOU TO DO YOUR WORK, TAKE CARE OF THINGS AT HOME, OR GET ALONG WITH OTHER PEOPLE: EXTREMELY DIFFICULT
GAD7 TOTAL SCORE: 20
2. NOT BEING ABLE TO STOP OR CONTROL WORRYING: NEARLY EVERY DAY
7. FEELING AFRAID AS IF SOMETHING AWFUL MIGHT HAPPEN: NEARLY EVERY DAY
7. FEELING AFRAID AS IF SOMETHING AWFUL MIGHT HAPPEN: NEARLY EVERY DAY

## 2023-01-05 ASSESSMENT — PATIENT HEALTH QUESTIONNAIRE - PHQ9
SUM OF ALL RESPONSES TO PHQ QUESTIONS 1-9: 12
10. IF YOU CHECKED OFF ANY PROBLEMS, HOW DIFFICULT HAVE THESE PROBLEMS MADE IT FOR YOU TO DO YOUR WORK, TAKE CARE OF THINGS AT HOME, OR GET ALONG WITH OTHER PEOPLE: EXTREMELY DIFFICULT
SUM OF ALL RESPONSES TO PHQ QUESTIONS 1-9: 12

## 2023-01-05 NOTE — TELEPHONE ENCOUNTER
Dr Davis he would like to see pt in clinic today or at the least virtual visit to discuss the forms.

## 2023-01-06 ENCOUNTER — OFFICE VISIT (OUTPATIENT)
Dept: FAMILY MEDICINE | Facility: CLINIC | Age: 63
End: 2023-01-06
Payer: COMMERCIAL

## 2023-01-06 VITALS
HEART RATE: 82 BPM | BODY MASS INDEX: 28.86 KG/M2 | DIASTOLIC BLOOD PRESSURE: 84 MMHG | RESPIRATION RATE: 16 BRPM | SYSTOLIC BLOOD PRESSURE: 122 MMHG | OXYGEN SATURATION: 98 % | WEIGHT: 217.8 LBS | TEMPERATURE: 97.5 F | HEIGHT: 73 IN

## 2023-01-06 DIAGNOSIS — F41.1 GAD (GENERALIZED ANXIETY DISORDER): ICD-10-CM

## 2023-01-06 DIAGNOSIS — Z63.6 CAREGIVER STRESS: Primary | ICD-10-CM

## 2023-01-06 PROCEDURE — 99214 OFFICE O/P EST MOD 30 MIN: CPT | Performed by: FAMILY MEDICINE

## 2023-01-06 SDOH — SOCIAL STABILITY - SOCIAL INSECURITY: DEPENDENT RELATIVE NEEDING CARE AT HOME: Z63.6

## 2023-01-06 ASSESSMENT — ANXIETY QUESTIONNAIRES: GAD7 TOTAL SCORE: 20

## 2023-01-06 ASSESSMENT — PAIN SCALES - GENERAL: PAINLEVEL: NO PAIN (0)

## 2023-01-06 ASSESSMENT — PATIENT HEALTH QUESTIONNAIRE - PHQ9
10. IF YOU CHECKED OFF ANY PROBLEMS, HOW DIFFICULT HAVE THESE PROBLEMS MADE IT FOR YOU TO DO YOUR WORK, TAKE CARE OF THINGS AT HOME, OR GET ALONG WITH OTHER PEOPLE: EXTREMELY DIFFICULT
SUM OF ALL RESPONSES TO PHQ QUESTIONS 1-9: 12

## 2023-01-06 NOTE — TELEPHONE ENCOUNTER
Given completed forms, faxed to [phone] with copy of office notes, and sent to [employer].    Teressa GOODE    [facility]

## 2023-01-06 NOTE — PROGRESS NOTES
"  Assessment & Plan     Caregiver stress      ELVIS (generalized anxiety disorder)  Patient is reevaluated regarding his underlying anxiety which is increased due to tragic accident his son has.  This has caused some mental anxiety and panic attacks.  We have started him on medication.  He is trying to focus on his mental health and doing some therapy.  Short-term disability forms were filled to the best of our ability.  Follow-up in 6 weeks for recheck.  Hoping in the next 2 to 3 months his symptoms will improve.  He can resume work once his symptoms are better.  In my opinion focusing on his mental health with therapy and medication for the next 10 to 12 weeks would be appropriate for his overall wellbeing.    956}     BMI:   Estimated body mass index is 28.74 kg/m  as calculated from the following:    Height as of this encounter: 1.854 m (6' 1\").    Weight as of this encounter: 98.8 kg (217 lb 12.8 oz).       Depression Screening Follow Up    PHQ 1/5/2023   PHQ-9 Total Score 12   Q9: Thoughts of better off dead/self-harm past 2 weeks Not at all     Last PHQ-9 1/5/2023   1.  Little interest or pleasure in doing things 2   2.  Feeling down, depressed, or hopeless 2   3.  Trouble falling or staying asleep, or sleeping too much 2   4.  Feeling tired or having little energy 2   5.  Poor appetite or overeating 1   6.  Feeling bad about yourself 1   7.  Trouble concentrating 2   8.  Moving slowly or restless 0   Q9: Thoughts of better off dead/self-harm past 2 weeks 0   PHQ-9 Total Score 12   Difficulty at work, home, or with people -       Follow Up Actions Taken  Crisis resource information provided in After Visit Summary  Follow up recommended: 6 weeks         No follow-ups on file.    Ru Davis MD  [facility] CITLALLI PRAIRICANDIDO Hu is a 62 year old{ presenting for the following health issues:  Anxiety and Depression  Patient came today to follow-up on his anxiety and depression.  He has been " "dealing with increased anxiety since his son has a tragic accident which cause permanent disability.  He has on and off been dealing with that but it has recently gotten worse.  Currently he is seeking therapy along with medication which was recently started.  He overall feels the same at this point.  He is planning to take some short-term disability, to focus on his mental health and some issues to deal with caregiver stress.  Currently in therapy and on medications    History of Present Illness       Mental Health Follow-up:  Patient presents to follow-up on Depression & Anxiety.Patient's depression since last visit has been:  Medium  The patient is not having other symptoms associated with depression.  Patient's anxiety since last visit has been:  Bad  The patient is not having other symptoms associated with anxiety.  Any significant life events: grief or loss  Patient is feeling anxious or having panic attacks.  Patient has no concerns about alcohol or drug use.    He eats 0-1 servings of fruits and vegetables daily.He consumes 1 sweetened beverage(s) daily.He exercises with enough effort to increase his heart rate 9 or less minutes per day.  He exercises with enough effort to increase his heart rate 3 or less days per week.   He is taking medications regularly.    Today's PHQ-9         PHQ-9 Total Score: 12    PHQ-9 Q9 Thoughts of better off dead/self-harm past 2 weeks :   Not at all    How difficult have these problems made it for you to do your work, take care of things at home, or get along with other people: Extremely difficult  Today's ELVIS-7 Score: 20           Review of Systems   Constitutional, HEENT, cardiovascular, pulmonary, gi and gu systems are negative, except as otherwise noted.      Objective    /84 (BP Location: Right arm, Patient Position: Sitting, Cuff Size: Adult Large)   Pulse 82   Temp 97.5  F (36.4  C) (Tympanic)   Resp 16   Ht 1.854 m (6' 1\")   Wt 98.8 kg (217 lb 12.8 oz)   " SpO2 98%   BMI 28.74 kg/m    Body mass index is 28.74 kg/m .  Physical Exam   GENERAL: healthy, alert and no distress  NECK: no adenopathy, no asymmetry, masses, or scars and thyroid normal to palpation  RESP: lungs clear to auscultation - no rales, rhonchi or wheezes  CV: regular rate and rhythm, normal S1 S2, no S3 or S4, no murmur, click or rub, no peripheral edema and peripheral pulses strong  ABDOMEN: soft, nontender, no hepatosplenomegaly, no masses and bowel sounds normal  MS: no gross musculoskeletal defects noted, no edema  Psych include increased anxiety decreased attention increased stress.

## 2023-01-09 NOTE — TELEPHONE ENCOUNTER
Oli, Harry Cabrales MD  You 19 minutes ago (2:40 PM)     [facility]  Stress test looks good.  Mitral regurgitation is remains mild.      Updated patient with Dr. Baird's comments and results via [employer].

## 2023-01-13 ENCOUNTER — TELEPHONE (OUTPATIENT)
Dept: FAMILY MEDICINE | Facility: CLINIC | Age: 63
End: 2023-01-13
Payer: COMMERCIAL

## 2023-01-13 NOTE — TELEPHONE ENCOUNTER
Forms/Letter Request    Type of form/letter: [employer]    Have you been seen for this request: Yes on 01/06/2023    Do we have the form/letter: Yes    When is form/letter needed by: when able    How would you like the form/letter returned: Fax    Placed in red folder, and put on Dr Davis's desk.    Teressa GOODE    [facility]

## 2023-01-17 NOTE — TELEPHONE ENCOUNTER
Picked up completed forms, faxed to [phone], and sent to [employer].    Teressa GOODE    [facility]

## 2023-04-03 ENCOUNTER — ALLIED HEALTH/NURSE VISIT (OUTPATIENT)
Dept: FAMILY MEDICINE | Facility: CLINIC | Age: 63
End: 2023-04-03
Payer: COMMERCIAL

## 2023-04-03 DIAGNOSIS — Z23 NEED FOR VACCINATION: Primary | ICD-10-CM

## 2023-04-03 PROCEDURE — 90677 PCV20 VACCINE IM: CPT

## 2023-04-03 PROCEDURE — 99207 PR NO CHARGE NURSE ONLY: CPT

## 2023-04-03 PROCEDURE — 90471 IMMUNIZATION ADMIN: CPT

## 2023-10-11 ENCOUNTER — TELEPHONE (OUTPATIENT)
Dept: FAMILY MEDICINE | Facility: CLINIC | Age: 63
End: 2023-10-11
Payer: COMMERCIAL

## 2023-10-11 DIAGNOSIS — Z12.5 SCREENING FOR PROSTATE CANCER: ICD-10-CM

## 2023-10-11 DIAGNOSIS — Z00.00 ENCOUNTER FOR ANNUAL PHYSICAL EXAM: Primary | ICD-10-CM

## 2023-10-11 DIAGNOSIS — E78.5 HYPERLIPIDEMIA LDL GOAL <130: ICD-10-CM

## 2023-10-11 NOTE — TELEPHONE ENCOUNTER
Order/Referral Request    Who is requesting: Patient     Orders being requested: requesting appt for full labwork.         Reason service is needed/diagnosis:   Annual check-up, on Lipitor    When are orders needed by: As able    Has this been discussed with Provider: No    Does patient have a preference on a Group/Provider/Facility? [facility]    Does patient have an appointment scheduled?: Yes,   Lab appt scheduled Monday 10/16 @ 2:15pm.     DO YOU WANT AN ANNUAL Px SCHEDULED?    Where to send orders: [facility]    Could we send this information to you in [employer] or would you prefer to receive a phone call?:   Patient would prefer a phone call     Okay to leave a detailed message?: Yes at Cell number on file:    Telephone Information:   Mobile [phone]

## 2023-10-11 NOTE — TELEPHONE ENCOUNTER
Please help this patient to schedule in person physical we can order the lab at the same time he does not just need labs he needs to see us so that we can evaluate and order labs which are necessary.  Ok to use same day if needed

## 2023-10-16 ENCOUNTER — MYC MEDICAL ADVICE (OUTPATIENT)
Dept: FAMILY MEDICINE | Facility: CLINIC | Age: 63
End: 2023-10-16

## 2023-10-16 NOTE — TELEPHONE ENCOUNTER
Scheduled pt for in-person annual Px 10/25 @ 10:30am.      Pt still asking if okay to come in for lab appt today  (10/16 @ 2:15pm).  Then you can discuss results at annual Px.      Jaye Guillermo on 10/16/2023 at 11:43 AM

## 2023-10-19 ENCOUNTER — LAB (OUTPATIENT)
Dept: LAB | Facility: CLINIC | Age: 63
End: 2023-10-19
Payer: COMMERCIAL

## 2023-10-19 ENCOUNTER — ALLIED HEALTH/NURSE VISIT (OUTPATIENT)
Dept: FAMILY MEDICINE | Facility: CLINIC | Age: 63
End: 2023-10-19
Payer: COMMERCIAL

## 2023-10-19 DIAGNOSIS — Z12.5 SCREENING FOR PROSTATE CANCER: ICD-10-CM

## 2023-10-19 DIAGNOSIS — Z00.00 ENCOUNTER FOR ANNUAL PHYSICAL EXAM: ICD-10-CM

## 2023-10-19 DIAGNOSIS — Z23 NEED FOR PROPHYLACTIC VACCINATION AND INOCULATION AGAINST INFLUENZA: Primary | ICD-10-CM

## 2023-10-19 DIAGNOSIS — E78.5 HYPERLIPIDEMIA LDL GOAL <130: ICD-10-CM

## 2023-10-19 LAB
ALBUMIN SERPL BCG-MCNC: 5.2 G/DL (ref 3.5–5.2)
ALP SERPL-CCNC: 74 U/L (ref 40–129)
ALT SERPL W P-5'-P-CCNC: 62 U/L (ref 0–70)
ANION GAP SERPL CALCULATED.3IONS-SCNC: 11 MMOL/L (ref 7–15)
AST SERPL W P-5'-P-CCNC: 30 U/L (ref 0–45)
BILIRUB SERPL-MCNC: 0.7 MG/DL
BUN SERPL-MCNC: 13.7 MG/DL (ref 8–23)
CALCIUM SERPL-MCNC: 10.2 MG/DL (ref 8.8–10.2)
CHLORIDE SERPL-SCNC: 99 MMOL/L (ref 98–107)
CHOLEST SERPL-MCNC: 219 MG/DL
CREAT SERPL-MCNC: 1.09 MG/DL (ref 0.67–1.17)
DEPRECATED HCO3 PLAS-SCNC: 28 MMOL/L (ref 22–29)
EGFRCR SERPLBLD CKD-EPI 2021: 78 ML/MIN/1.73M2
ERYTHROCYTE [DISTWIDTH] IN BLOOD BY AUTOMATED COUNT: 12.6 % (ref 10–15)
GLUCOSE SERPL-MCNC: 115 MG/DL (ref 70–99)
HCT VFR BLD AUTO: 48.9 % (ref 40–53)
HDLC SERPL-MCNC: 44 MG/DL
HGB BLD-MCNC: 16.7 G/DL (ref 13.3–17.7)
LDLC SERPL CALC-MCNC: 157 MG/DL
MCH RBC QN AUTO: 29.8 PG (ref 26.5–33)
MCHC RBC AUTO-ENTMCNC: 34.2 G/DL (ref 31.5–36.5)
MCV RBC AUTO: 87 FL (ref 78–100)
NONHDLC SERPL-MCNC: 175 MG/DL
PLATELET # BLD AUTO: 236 10E3/UL (ref 150–450)
POTASSIUM SERPL-SCNC: 5.1 MMOL/L (ref 3.4–5.3)
PROT SERPL-MCNC: 7.6 G/DL (ref 6.4–8.3)
PSA SERPL DL<=0.01 NG/ML-MCNC: 2.47 NG/ML (ref 0–4.5)
RBC # BLD AUTO: 5.61 10E6/UL (ref 4.4–5.9)
SODIUM SERPL-SCNC: 138 MMOL/L (ref 135–145)
TRIGL SERPL-MCNC: 88 MG/DL
WBC # BLD AUTO: 6.5 10E3/UL (ref 4–11)

## 2023-10-19 PROCEDURE — 90682 RIV4 VACC RECOMBINANT DNA IM: CPT

## 2023-10-19 PROCEDURE — 99207 PR NO CHARGE NURSE ONLY: CPT

## 2023-10-19 PROCEDURE — G0103 PSA SCREENING: HCPCS

## 2023-10-19 PROCEDURE — 36415 COLL VENOUS BLD VENIPUNCTURE: CPT

## 2023-10-19 PROCEDURE — 80053 COMPREHEN METABOLIC PANEL: CPT

## 2023-10-19 PROCEDURE — 85027 COMPLETE CBC AUTOMATED: CPT

## 2023-10-19 PROCEDURE — 90471 IMMUNIZATION ADMIN: CPT

## 2023-10-19 PROCEDURE — 80061 LIPID PANEL: CPT

## 2023-10-25 ENCOUNTER — TRANSFERRED RECORDS (OUTPATIENT)
Dept: HEALTH INFORMATION MANAGEMENT | Facility: CLINIC | Age: 63
End: 2023-10-25

## 2023-10-25 ENCOUNTER — OFFICE VISIT (OUTPATIENT)
Dept: FAMILY MEDICINE | Facility: CLINIC | Age: 63
End: 2023-10-25
Payer: COMMERCIAL

## 2023-10-25 VITALS
DIASTOLIC BLOOD PRESSURE: 82 MMHG | BODY MASS INDEX: 28.42 KG/M2 | WEIGHT: 209.8 LBS | RESPIRATION RATE: 16 BRPM | HEIGHT: 72 IN | OXYGEN SATURATION: 99 % | SYSTOLIC BLOOD PRESSURE: 124 MMHG | TEMPERATURE: 97.3 F | HEART RATE: 76 BPM

## 2023-10-25 DIAGNOSIS — Z00.00 ENCOUNTER FOR ANNUAL PHYSICAL EXAM: ICD-10-CM

## 2023-10-25 DIAGNOSIS — F40.10 SOCIAL PHOBIA: ICD-10-CM

## 2023-10-25 DIAGNOSIS — Z63.6 CAREGIVER STRESS: ICD-10-CM

## 2023-10-25 DIAGNOSIS — F41.1 GAD (GENERALIZED ANXIETY DISORDER): ICD-10-CM

## 2023-10-25 DIAGNOSIS — Z12.5 SCREENING FOR PROSTATE CANCER: ICD-10-CM

## 2023-10-25 DIAGNOSIS — E78.5 HYPERLIPIDEMIA LDL GOAL <130: Primary | ICD-10-CM

## 2023-10-25 LAB
HBA1C MFR BLD: 5.9 % (ref 0–5.6)
TSH SERPL DL<=0.005 MIU/L-ACNC: 1.66 UIU/ML (ref 0.3–4.2)

## 2023-10-25 PROCEDURE — 90480 ADMN SARSCOV2 VAC 1/ONLY CMP: CPT | Performed by: FAMILY MEDICINE

## 2023-10-25 PROCEDURE — 84443 ASSAY THYROID STIM HORMONE: CPT | Performed by: FAMILY MEDICINE

## 2023-10-25 PROCEDURE — 99396 PREV VISIT EST AGE 40-64: CPT | Mod: 25 | Performed by: FAMILY MEDICINE

## 2023-10-25 PROCEDURE — 99214 OFFICE O/P EST MOD 30 MIN: CPT | Mod: 25 | Performed by: FAMILY MEDICINE

## 2023-10-25 PROCEDURE — 36415 COLL VENOUS BLD VENIPUNCTURE: CPT | Performed by: FAMILY MEDICINE

## 2023-10-25 PROCEDURE — 83036 HEMOGLOBIN GLYCOSYLATED A1C: CPT | Performed by: FAMILY MEDICINE

## 2023-10-25 PROCEDURE — 91320 SARSCV2 VAC 30MCG TRS-SUC IM: CPT | Performed by: FAMILY MEDICINE

## 2023-10-25 RX ORDER — VENLAFAXINE HYDROCHLORIDE 150 MG/1
150 CAPSULE, EXTENDED RELEASE ORAL DAILY
Qty: 90 CAPSULE | Refills: 3 | Status: SHIPPED | OUTPATIENT
Start: 2023-10-25 | End: 2024-01-03

## 2023-10-25 RX ORDER — ALPRAZOLAM 0.25 MG
TABLET ORAL
COMMUNITY
End: 2024-09-18

## 2023-10-25 RX ORDER — ATORVASTATIN CALCIUM 40 MG/1
40 TABLET, FILM COATED ORAL DAILY
Qty: 90 TABLET | Refills: 3 | Status: SHIPPED | OUTPATIENT
Start: 2023-10-25 | End: 2024-01-03

## 2023-10-25 SDOH — SOCIAL STABILITY - SOCIAL INSECURITY: DEPENDENT RELATIVE NEEDING CARE AT HOME: Z63.6

## 2023-10-25 ASSESSMENT — ENCOUNTER SYMPTOMS
WEAKNESS: 0
HEADACHES: 0
ARTHRALGIAS: 0
SORE THROAT: 0
ABDOMINAL PAIN: 0
COUGH: 0
HEMATOCHEZIA: 0
MYALGIAS: 0
CONSTIPATION: 0
FEVER: 0
DYSURIA: 0
FREQUENCY: 0
NAUSEA: 0
PARESTHESIAS: 0
PALPITATIONS: 0
HEARTBURN: 0
DIARRHEA: 0
CHILLS: 0
DIZZINESS: 0
EYE PAIN: 0
SHORTNESS OF BREATH: 0
HEMATURIA: 0
JOINT SWELLING: 0
NERVOUS/ANXIOUS: 1

## 2023-10-25 ASSESSMENT — PAIN SCALES - GENERAL: PAINLEVEL: NO PAIN (0)

## 2023-10-25 NOTE — PROGRESS NOTES
SUBJECTIVE:   CC: Fritz is an 62 year old who presents for preventative health visit.       10/25/2023    10:10 AM   Additional Questions   Roomed by Lauren KAPADIA       Healthy Habits:     Getting at least 3 servings of Calcium per day:  NO    Bi-annual eye exam:  Yes    Dental care twice a year:  Yes    Sleep apnea or symptoms of sleep apnea:  Sleep apnea    Diet:  Regular (no restrictions)    Frequency of exercise:  2-3 days/week    Duration of exercise:  30-45 minutes    Taking medications regularly:  Yes    Medication side effects:  None    Additional concerns today:  Yes    Patient is overall very healthy.  He is currently on anxiety as well as cholesterol medication.  He has been dealing with caregiver stress for the last 2 years after his son gone through life-changing incident.  He noticed he is losing some weight but part of that he is not eating very well.  His anxiety is well controlled overall denies any depression or any physical symptoms.            Have you ever done Advance Care Planning? (For example, a Health Directive, POLST, or a discussion with a medical provider or your loved ones about your wishes): No, advance care planning information given to patient to review.  Patient plans to discuss their wishes with loved ones or provider.      Social History     Tobacco Use    Smoking status: Never    Smokeless tobacco: Never    Tobacco comments:     Do not smoke   Substance Use Topics    Alcohol use: Yes     Comment: Weekends             10/25/2023    10:01 AM   Alcohol Use   Prescreen: >3 drinks/day or >7 drinks/week? No       Last PSA:   PSA   Date Value Ref Range Status   09/29/2020 3.26 0 - 4 ug/L Final     Comment:     Assay Method:  Chemiluminescence using Siemens Vista analyzer     Prostate Specific Antigen Screen   Date Value Ref Range Status   10/19/2023 2.47 0.00 - 4.50 ng/mL Final   10/08/2021 2.70 0.00 - 4.00 ug/L Final       Reviewed orders with patient. Reviewed health maintenance and updated  "orders accordingly - Yes      Reviewed and updated as needed this visit by clinical staff   Tobacco  Allergies  Meds              Reviewed and updated as needed this visit by Provider                     Review of Systems   Constitutional:  Negative for chills and fever.   HENT:  Negative for congestion, ear pain, hearing loss and sore throat.    Eyes:  Negative for pain and visual disturbance.   Respiratory:  Negative for cough and shortness of breath.    Cardiovascular:  Negative for chest pain, palpitations and peripheral edema.   Gastrointestinal:  Negative for abdominal pain, constipation, diarrhea, heartburn, hematochezia and nausea.   Genitourinary:  Negative for dysuria, frequency, genital sores, hematuria, impotence, penile discharge and urgency.   Musculoskeletal:  Negative for arthralgias, joint swelling and myalgias.   Skin:  Negative for rash.   Neurological:  Negative for dizziness, weakness, headaches and paresthesias.   Psychiatric/Behavioral:  Negative for mood changes. The patient is nervous/anxious.          OBJECTIVE:   /82   Pulse 76   Temp 97.3  F (36.3  C) (Tympanic)   Resp 16   Ht 1.836 m (6' 0.28\")   Wt 95.2 kg (209 lb 12.8 oz)   SpO2 99%   BMI 28.23 kg/m      Physical Exam  GENERAL: healthy, alert and no distress  EYES: Eyes grossly normal to inspection, PERRL and conjunctivae and sclerae normal  HENT: ear canals and TM's normal, nose and mouth without ulcers or lesions  NECK: no adenopathy, no asymmetry, masses, or scars and thyroid normal to palpation  RESP: lungs clear to auscultation - no rales, rhonchi or wheezes  CV: regular rate and rhythm, normal S1 S2, no S3 or S4, no murmur, click or rub, no peripheral edema and peripheral pulses strong  ABDOMEN: soft, nontender, no hepatosplenomegaly, no masses and bowel sounds normal  MS: no gross musculoskeletal defects noted, no edema  SKIN: no suspicious lesions or rashes  NEURO: Normal strength and tone, mentation intact and " "speech normal  PSYCH: mentation appears normal, affect normal/bright    Diagnostic Test Results:  Labs reviewed in Epic    ASSESSMENT/PLAN:   Fritz was seen today for physical and consult.    Diagnoses and all orders for this visit:    Hyperlipidemia LDL goal <130  -     atorvastatin (LIPITOR) 40 MG tablet; Take 1 tablet (40 mg) by mouth daily    Encounter for annual physical exam  -     Hemoglobin A1c; Future  -     TSH; Future  -     Hemoglobin A1c  -     TSH  Was about weight.  I suggest we should continue to monitor at this time his lab work did not indicate any worsening A1c was checked which not in diabetic range.  I would see him back in 3 months and will monitor advised to eat 3 meals a day small meals with high-protein.  Exercise eat healthy.  As long as there is no other constitutional symptoms we can continue to monitor.  If his weight is going below 200 despite his good efforts initial let us know.  Caregiver stress    ELVIS (generalized anxiety disorder)  -     venlafaxine (EFFEXOR XR) 150 MG 24 hr capsule; Take 1 capsule (150 mg) by mouth daily    Screening for prostate cancer    Social phobia  -     venlafaxine (EFFEXOR XR) 150 MG 24 hr capsule; Take 1 capsule (150 mg) by mouth daily    Other orders  -     REVIEW OF HEALTH MAINTENANCE PROTOCOL ORDERS  -     COVID-19 12+ (2023-24) (PFIZER)        Patient has been advised of split billing requirements and indicates understanding: Yes      COUNSELING:   Reviewed preventive health counseling, as reflected in patient instructions       Regular exercise       Healthy diet/nutrition      BMI:   Estimated body mass index is 28.23 kg/m  as calculated from the following:    Height as of this encounter: 1.836 m (6' 0.28\").    Weight as of this encounter: 95.2 kg (209 lb 12.8 oz).         He reports that he has never smoked. He has never used smokeless tobacco.            Ru Davis MD  [facility]  "

## 2023-12-06 ENCOUNTER — ALLIED HEALTH/NURSE VISIT (OUTPATIENT)
Dept: FAMILY MEDICINE | Facility: CLINIC | Age: 63
End: 2023-12-06
Payer: COMMERCIAL

## 2023-12-06 DIAGNOSIS — Z23 ENCOUNTER FOR IMMUNIZATION: Primary | ICD-10-CM

## 2023-12-06 PROCEDURE — 90678 RSV VACC PREF BIVALENT IM: CPT

## 2023-12-06 PROCEDURE — 99207 PR NO CHARGE NURSE ONLY: CPT

## 2023-12-06 PROCEDURE — 90471 IMMUNIZATION ADMIN: CPT

## 2023-12-06 NOTE — PROGRESS NOTES
Prior to immunization administration, verified patients identity using patient s name and date of birth. Please see Immunization Activity for additional information.     Screening Questionnaire for Adult Immunization    Are you sick today?   No   Do you have allergies to medications, food, a vaccine component or latex?   No   Have you ever had a serious reaction after receiving a vaccination?   No   Do you have a long-term health problem with heart, lung, kidney, or metabolic disease (e.g., diabetes), asthma, a blood disorder, no spleen, complement component deficiency, a cochlear implant, or a spinal fluid leak?  Are you on long-term aspirin therapy?   No   Do you have cancer, leukemia, HIV/AIDS, or any other immune system problem?   No   Do you have a parent, brother, or sister with an immune system problem?   No   In the past 3 months, have you taken medications that affect  your immune system, such as prednisone, other steroids, or anticancer drugs; drugs for the treatment of rheumatoid arthritis, Crohn s disease, or psoriasis; or have you had radiation treatments?   No   Have you had a seizure, or a brain or other nervous system problem?   No   During the past year, have you received a transfusion of blood or blood    products, or been given immune (gamma) globulin or antiviral drug?   No   For women: Are you pregnant or is there a chance you could become       pregnant during the next month?   Na     Have you received any vaccinations in the past 4 weeks?   No     Immunization questionnaire answers were all negative.    I have reviewed the following standing orders:   This patient is due and qualifies for the RSV vaccine.    Click here for RSV Vaccine Standing Order    I have reviewed the vaccines inclusion and exclusion criteria; No concerns regarding eligibility.     Patient instructed to remain in clinic for 15 minutes afterwards, and to report any adverse reactions.     Screening performed by Soledad Francois  MA on 12/6/2023 at 2:03 PM.

## 2023-12-19 ENCOUNTER — TRANSFERRED RECORDS (OUTPATIENT)
Dept: HEALTH INFORMATION MANAGEMENT | Facility: CLINIC | Age: 63
End: 2023-12-19
Payer: COMMERCIAL

## 2024-01-03 DIAGNOSIS — E78.5 HYPERLIPIDEMIA LDL GOAL <130: ICD-10-CM

## 2024-01-03 DIAGNOSIS — F40.10 SOCIAL PHOBIA: ICD-10-CM

## 2024-01-03 DIAGNOSIS — F41.1 GAD (GENERALIZED ANXIETY DISORDER): ICD-10-CM

## 2024-01-03 DIAGNOSIS — N40.1 BENIGN PROSTATIC HYPERPLASIA WITH LOWER URINARY TRACT SYMPTOMS, SYMPTOM DETAILS UNSPECIFIED: Primary | ICD-10-CM

## 2024-01-03 RX ORDER — TAMSULOSIN HYDROCHLORIDE 0.4 MG/1
0.4 CAPSULE ORAL DAILY
Qty: 90 CAPSULE | Refills: 1 | Status: SHIPPED | OUTPATIENT
Start: 2024-01-03 | End: 2024-07-22

## 2024-01-03 RX ORDER — ATORVASTATIN CALCIUM 40 MG/1
40 TABLET, FILM COATED ORAL DAILY
Qty: 90 TABLET | Refills: 2 | Status: SHIPPED | OUTPATIENT
Start: 2024-01-03 | End: 2024-07-31

## 2024-01-03 RX ORDER — VENLAFAXINE HYDROCHLORIDE 150 MG/1
150 CAPSULE, EXTENDED RELEASE ORAL DAILY
Qty: 90 CAPSULE | Refills: 2 | Status: SHIPPED | OUTPATIENT
Start: 2024-01-03 | End: 2024-08-28

## 2024-01-03 RX ORDER — TAMSULOSIN HYDROCHLORIDE 0.4 MG/1
CAPSULE ORAL
COMMUNITY
Start: 2023-11-27 | End: 2024-01-03

## 2024-01-03 NOTE — TELEPHONE ENCOUNTER
Prescription approved per [facility] Refill Protocol.  Adore Peterson, RN  [facility] and Home Triage Nurse

## 2024-04-19 ENCOUNTER — DOCUMENTATION ONLY (OUTPATIENT)
Dept: LAB | Facility: CLINIC | Age: 64
End: 2024-04-19
Payer: COMMERCIAL

## 2024-04-19 DIAGNOSIS — E78.5 HYPERLIPIDEMIA LDL GOAL <130: Primary | ICD-10-CM

## 2024-04-19 DIAGNOSIS — R73.9 ELEVATED RANDOM BLOOD GLUCOSE LEVEL: ICD-10-CM

## 2024-04-19 NOTE — PROGRESS NOTES
Dr. Susan Jain has an upcoming lab appointment and it looks like you are the primary.. he has no available labs in his chart . Is there something you want to order?     Future Appointments   Date Time Provider Department Center   4/25/2024 11:30 AM [facility] LAB [facility]     Patient is scheduled for the following lab(s): 0    There is no order available. Please review and place either future orders or HMPO (Review of Health Maintenance Protocol Orders), as appropriate.    There are no preventive care reminders to display for this patient.  Judy Goldman

## 2024-04-30 ENCOUNTER — LAB (OUTPATIENT)
Dept: LAB | Facility: CLINIC | Age: 64
End: 2024-04-30
Payer: COMMERCIAL

## 2024-04-30 DIAGNOSIS — E78.5 HYPERLIPIDEMIA LDL GOAL <130: ICD-10-CM

## 2024-04-30 DIAGNOSIS — Z12.5 SCREENING FOR PROSTATE CANCER: ICD-10-CM

## 2024-04-30 DIAGNOSIS — R73.9 ELEVATED RANDOM BLOOD GLUCOSE LEVEL: ICD-10-CM

## 2024-04-30 DIAGNOSIS — Z12.5 SCREENING FOR PROSTATE CANCER: Primary | ICD-10-CM

## 2024-04-30 LAB — HBA1C MFR BLD: 5.7 % (ref 0–5.6)

## 2024-04-30 PROCEDURE — 36415 COLL VENOUS BLD VENIPUNCTURE: CPT

## 2024-04-30 PROCEDURE — 80061 LIPID PANEL: CPT

## 2024-04-30 PROCEDURE — 83036 HEMOGLOBIN GLYCOSYLATED A1C: CPT

## 2024-04-30 PROCEDURE — 80053 COMPREHEN METABOLIC PANEL: CPT

## 2024-04-30 PROCEDURE — G0103 PSA SCREENING: HCPCS

## 2024-05-01 ENCOUNTER — MYC MEDICAL ADVICE (OUTPATIENT)
Dept: FAMILY MEDICINE | Facility: CLINIC | Age: 64
End: 2024-05-01
Payer: COMMERCIAL

## 2024-05-01 LAB
ALBUMIN SERPL BCG-MCNC: 4.8 G/DL (ref 3.5–5.2)
ALP SERPL-CCNC: 78 U/L (ref 40–150)
ALT SERPL W P-5'-P-CCNC: 43 U/L (ref 0–70)
ANION GAP SERPL CALCULATED.3IONS-SCNC: 14 MMOL/L (ref 7–15)
AST SERPL W P-5'-P-CCNC: 30 U/L (ref 0–45)
BILIRUB SERPL-MCNC: 1.1 MG/DL
BUN SERPL-MCNC: 12.2 MG/DL (ref 8–23)
CALCIUM SERPL-MCNC: 9.8 MG/DL (ref 8.8–10.2)
CHLORIDE SERPL-SCNC: 101 MMOL/L (ref 98–107)
CHOLEST SERPL-MCNC: 204 MG/DL
CREAT SERPL-MCNC: 1.14 MG/DL (ref 0.67–1.17)
DEPRECATED HCO3 PLAS-SCNC: 24 MMOL/L (ref 22–29)
EGFRCR SERPLBLD CKD-EPI 2021: 73 ML/MIN/1.73M2
FASTING STATUS PATIENT QL REPORTED: YES
GLUCOSE SERPL-MCNC: 97 MG/DL (ref 70–99)
HDLC SERPL-MCNC: 44 MG/DL
LDLC SERPL CALC-MCNC: 140 MG/DL
NONHDLC SERPL-MCNC: 160 MG/DL
POTASSIUM SERPL-SCNC: 4 MMOL/L (ref 3.4–5.3)
PROT SERPL-MCNC: 7.2 G/DL (ref 6.4–8.3)
PSA SERPL DL<=0.01 NG/ML-MCNC: 2.88 NG/ML (ref 0–4.5)
SODIUM SERPL-SCNC: 139 MMOL/L (ref 135–145)
TRIGL SERPL-MCNC: 100 MG/DL

## 2024-05-08 ENCOUNTER — TRANSCRIBE ORDERS (OUTPATIENT)
Dept: OTHER | Age: 64
End: 2024-05-08

## 2024-05-08 DIAGNOSIS — K21.9 GASTRO-ESOPHAGEAL REFLUX DISEASE WITHOUT ESOPHAGITIS: Primary | ICD-10-CM

## 2024-05-14 ENCOUNTER — TRANSFERRED RECORDS (OUTPATIENT)
Dept: HEALTH INFORMATION MANAGEMENT | Facility: CLINIC | Age: 64
End: 2024-05-14
Payer: COMMERCIAL

## 2024-07-02 ENCOUNTER — HOSPITAL ENCOUNTER (OUTPATIENT)
Dept: SPEECH THERAPY | Facility: CLINIC | Age: 64
Discharge: HOME | End: 2024-07-02
Attending: PHYSICIAN ASSISTANT
Payer: COMMERCIAL

## 2024-07-02 ENCOUNTER — HOSPITAL ENCOUNTER (OUTPATIENT)
Dept: GENERAL RADIOLOGY | Facility: CLINIC | Age: 64
Discharge: HOME | End: 2024-07-02
Attending: PHYSICIAN ASSISTANT
Payer: COMMERCIAL

## 2024-07-02 DIAGNOSIS — K21.9 GASTRO-ESOPHAGEAL REFLUX DISEASE WITHOUT ESOPHAGITIS: ICD-10-CM

## 2024-07-02 DIAGNOSIS — K21.9 GASTROESOPHAGEAL REFLUX DISEASE WITHOUT ESOPHAGITIS: ICD-10-CM

## 2024-07-02 PROCEDURE — 74230 X-RAY XM SWLNG FUNCJ C+: CPT

## 2024-07-02 PROCEDURE — 92611 MOTION FLUOROSCOPY/SWALLOW: CPT | Mod: GN | Performed by: SPEECH-LANGUAGE PATHOLOGIST

## 2024-07-02 NOTE — PROGRESS NOTES
"SPEECH LANGUAGE PATHOLOGY EVALUATION    Subjective      Presenting condition or subjective complaint: This 63 year old male was referred for a Video Swallow Study (Esophagram scheduled for 7/3) d/t increased dysphagia symptoms. Pt describes a globus sensation with dry or crunchy popcorn/chips. No specific triggers or patterns reported. No associated respiratory symptoms or episodes of aspiration. No hx of choking/airway obstruction. Pt reported family hx of esophageal cancer and would like swallow function assessed to rule out any abnormalities. Hx of GERD that is controlled by 1x/day PPI - pt questions how long he can be on this medications - deferred to MD. Hx of sleep apnea with implanted device, CAD, depressive disorder. Pt expresses that he needs to be \"around for a long time\" to support his son.    Prior diagnostic imaging/testing results:   Previous EGDs negative   Prior therapy history for the same diagnosis, illness or injury: No      Living Environment  Social support: With family members      Objective     SWALLOW EVALUTION    Current Diet/Method of Nutritional Intake: thin liquids (level 0), regular diet        VIDEOFLUOROSCOPIC SWALLOW STUDY  Radiologist: Dr. Medley  Views Taken: left lateral   Physical location of procedure: [facility]    VFSS textures trialed:   VFSS Eval: Thin Liquids  Mode of Presentation: cup, self-fed   Preparatory Phase: WFL  Oral Phase: WFL  Bolus Location When Swallow Initiated: posterior angle of ramus  Pharyngeal Phase: residue in valleculae, residue in pyriform sinus  Rosenbeck's Penetration Aspiration Scale: 1 - no aspiration, contrast does not enter airway    Diagnostic Statement: Mildly reduced pharyngeal constriction; no penetration or aspiration    VFSS Eval: Purees  Mode of Presentation: self-fed   Preparatory Phase: WFL  Oral Phase: WFL  Bolus Location When Swallow Initiated: posterior angle of ramus  Pharyngeal Phase: WFL  Rosenbeck s Penetration Aspiration Scale: 1 - no " aspiration, contrast does not enter airway    Diagnostic Statement: WFL; cleared with no difficulty    VFSS Eval: Solids  Mode of Presentation: self-fed   Preparatory Phase: WFL  Oral Phase: WFL  Bolus Location When Swallow Initiated: posterior angle of ramus  Pharyngeal Phase: WFL   Rosenbeck s Penetration Aspiration Scale: 1 - no aspiration, contrast does not enter airway    Diagnostic Statement: Mildly prolonged, but cleared with minimal deficits     ESOPHAGEAL PHASE OF SWALLOW  patient reports symptoms of esophageal dysphagia  patient presents with symptoms of esophageal dysphagia     SWALLOW ASSESSMENT CLINICAL IMPRESSIONS AND RATIONALE  Diet Consistency Recommendations: thin liquids (level 0), regular diet    Recommended Feeding/Eating Techniques: small bolus size, slow rate of intake, maintain upright sitting position for eating, maintain upright posture during/after eating for 30 minutes       Assessment & Plan   CLINICAL IMPRESSIONS   Medical Diagnosis:    GERD  Treatment Diagnosis:   Minimal pharyngeal dysphagia; suspected esophageal dysphagia  Impression/Assessment:  This 63 year old male presents with minimal pharyngeal dysphagia on Video Swallow Study characterized by reduced pharyngeal constriction and UES opening that results in mild pyriform residue with consecutive gulps of thin liquids. Suspect component of esophageal dysphagia that is contributing to this finding. Esophagram scheduled for tomorrow. No penetration or aspiration throughout the study. Reviewed with the patient. No further SLP services at this time. Consider GI referral pending Esophagram results.       Recommended Referrals to Other Professionals:  GI pending esophagram tomorrow  Education Assessment:   Learner/Method: Patient  Education Comments: Reviewed all images    Risks and benefits of evaluation/treatment have been explained.   Patient/Family/caregiver agrees with Plan of Care.     Evaluation Time:  20         Signing  Clinician: Dipti Choe, SLP

## 2024-07-03 ENCOUNTER — HOSPITAL ENCOUNTER (OUTPATIENT)
Dept: GENERAL RADIOLOGY | Facility: CLINIC | Age: 64
Discharge: HOME | End: 2024-07-03
Attending: PHYSICIAN ASSISTANT | Admitting: PHYSICIAN ASSISTANT
Payer: COMMERCIAL

## 2024-07-03 DIAGNOSIS — K21.9 GASTROESOPHAGEAL REFLUX DISEASE WITHOUT ESOPHAGITIS: ICD-10-CM

## 2024-07-03 PROCEDURE — 250N000013 HC RX MED GY IP 250 OP 250 PS 637: Performed by: PHYSICIAN ASSISTANT

## 2024-07-03 PROCEDURE — 74221 X-RAY XM ESOPHAGUS 2CNTRST: CPT

## 2024-07-03 RX ADMIN — ANTACID/ANTIFLATULENT 4 G: 380; 550; 10; 10 GRANULE, EFFERVESCENT ORAL at 08:44

## 2024-07-20 DIAGNOSIS — N40.1 BENIGN PROSTATIC HYPERPLASIA WITH LOWER URINARY TRACT SYMPTOMS, SYMPTOM DETAILS UNSPECIFIED: ICD-10-CM

## 2024-07-22 RX ORDER — TAMSULOSIN HYDROCHLORIDE 0.4 MG/1
0.4 CAPSULE ORAL DAILY
Qty: 90 CAPSULE | Refills: 0 | Status: SHIPPED | OUTPATIENT
Start: 2024-07-22 | End: 2024-09-18

## 2024-07-30 DIAGNOSIS — E78.5 HYPERLIPIDEMIA LDL GOAL <130: ICD-10-CM

## 2024-07-31 RX ORDER — ATORVASTATIN CALCIUM 40 MG/1
40 TABLET, FILM COATED ORAL DAILY
Qty: 90 TABLET | Refills: 0 | Status: SHIPPED | OUTPATIENT
Start: 2024-07-31 | End: 2024-09-18

## 2024-08-27 DIAGNOSIS — F40.10 SOCIAL PHOBIA: ICD-10-CM

## 2024-08-27 DIAGNOSIS — F41.1 GAD (GENERALIZED ANXIETY DISORDER): ICD-10-CM

## 2024-08-28 RX ORDER — VENLAFAXINE HYDROCHLORIDE 150 MG/1
150 CAPSULE, EXTENDED RELEASE ORAL DAILY
Qty: 90 CAPSULE | Refills: 0 | Status: SHIPPED | OUTPATIENT
Start: 2024-08-28 | End: 2024-09-18

## 2024-08-28 NOTE — TELEPHONE ENCOUNTER
Pt is scheduled w/Dr Davis for Med Refills on Wed, Sept 25 @ 9am, but will call to try to get something earlier due to a possible trip and running out of meds  April

## 2024-09-18 ENCOUNTER — OFFICE VISIT (OUTPATIENT)
Dept: FAMILY MEDICINE | Facility: CLINIC | Age: 64
End: 2024-09-18
Payer: COMMERCIAL

## 2024-09-18 VITALS
TEMPERATURE: 97.1 F | SYSTOLIC BLOOD PRESSURE: 130 MMHG | BODY MASS INDEX: 28.65 KG/M2 | OXYGEN SATURATION: 98 % | DIASTOLIC BLOOD PRESSURE: 88 MMHG | HEIGHT: 73 IN | WEIGHT: 216.2 LBS | HEART RATE: 69 BPM | RESPIRATION RATE: 16 BRPM

## 2024-09-18 DIAGNOSIS — E78.5 HYPERLIPIDEMIA LDL GOAL <130: ICD-10-CM

## 2024-09-18 DIAGNOSIS — F41.1 GAD (GENERALIZED ANXIETY DISORDER): ICD-10-CM

## 2024-09-18 DIAGNOSIS — N40.1 BENIGN PROSTATIC HYPERPLASIA WITH LOWER URINARY TRACT SYMPTOMS, SYMPTOM DETAILS UNSPECIFIED: ICD-10-CM

## 2024-09-18 DIAGNOSIS — G56.02 CARPAL TUNNEL SYNDROME OF LEFT WRIST: ICD-10-CM

## 2024-09-18 DIAGNOSIS — Z01.818 PRE-OP EXAM: Primary | ICD-10-CM

## 2024-09-18 DIAGNOSIS — M65.30 TRIGGER FINGER, ACQUIRED: ICD-10-CM

## 2024-09-18 LAB
ALBUMIN SERPL BCG-MCNC: 4.8 G/DL (ref 3.5–5.2)
ALP SERPL-CCNC: 74 U/L (ref 40–150)
ALT SERPL W P-5'-P-CCNC: 49 U/L (ref 0–70)
ANION GAP SERPL CALCULATED.3IONS-SCNC: 12 MMOL/L (ref 7–15)
AST SERPL W P-5'-P-CCNC: 29 U/L (ref 0–45)
BILIRUB SERPL-MCNC: 1 MG/DL
BUN SERPL-MCNC: 17.9 MG/DL (ref 8–23)
CALCIUM SERPL-MCNC: 9.3 MG/DL (ref 8.8–10.4)
CHLORIDE SERPL-SCNC: 102 MMOL/L (ref 98–107)
CHOLEST SERPL-MCNC: 207 MG/DL
CREAT SERPL-MCNC: 1.07 MG/DL (ref 0.67–1.17)
EGFRCR SERPLBLD CKD-EPI 2021: 79 ML/MIN/1.73M2
ERYTHROCYTE [DISTWIDTH] IN BLOOD BY AUTOMATED COUNT: 12.6 % (ref 10–15)
FASTING STATUS PATIENT QL REPORTED: YES
FASTING STATUS PATIENT QL REPORTED: YES
GLUCOSE SERPL-MCNC: 110 MG/DL (ref 70–99)
HCO3 SERPL-SCNC: 24 MMOL/L (ref 22–29)
HCT VFR BLD AUTO: 46.3 % (ref 40–53)
HDLC SERPL-MCNC: 43 MG/DL
HGB BLD-MCNC: 16.2 G/DL (ref 13.3–17.7)
LDLC SERPL CALC-MCNC: 146 MG/DL
MCH RBC QN AUTO: 30.3 PG (ref 26.5–33)
MCHC RBC AUTO-ENTMCNC: 35 G/DL (ref 31.5–36.5)
MCV RBC AUTO: 87 FL (ref 78–100)
NONHDLC SERPL-MCNC: 164 MG/DL
PLATELET # BLD AUTO: 205 10E3/UL (ref 150–450)
POTASSIUM SERPL-SCNC: 4.6 MMOL/L (ref 3.4–5.3)
PROT SERPL-MCNC: 7.6 G/DL (ref 6.4–8.3)
RBC # BLD AUTO: 5.35 10E6/UL (ref 4.4–5.9)
SODIUM SERPL-SCNC: 138 MMOL/L (ref 135–145)
TRIGL SERPL-MCNC: 89 MG/DL
WBC # BLD AUTO: 5.7 10E3/UL (ref 4–11)

## 2024-09-18 PROCEDURE — 85027 COMPLETE CBC AUTOMATED: CPT | Performed by: FAMILY MEDICINE

## 2024-09-18 PROCEDURE — 90673 RIV3 VACCINE NO PRESERV IM: CPT | Performed by: FAMILY MEDICINE

## 2024-09-18 PROCEDURE — 36415 COLL VENOUS BLD VENIPUNCTURE: CPT | Performed by: FAMILY MEDICINE

## 2024-09-18 PROCEDURE — 90480 ADMN SARSCOV2 VAC 1/ONLY CMP: CPT | Performed by: FAMILY MEDICINE

## 2024-09-18 PROCEDURE — 91320 SARSCV2 VAC 30MCG TRS-SUC IM: CPT | Performed by: FAMILY MEDICINE

## 2024-09-18 PROCEDURE — 80061 LIPID PANEL: CPT | Performed by: FAMILY MEDICINE

## 2024-09-18 PROCEDURE — 99214 OFFICE O/P EST MOD 30 MIN: CPT | Mod: 25 | Performed by: FAMILY MEDICINE

## 2024-09-18 PROCEDURE — 80053 COMPREHEN METABOLIC PANEL: CPT | Performed by: FAMILY MEDICINE

## 2024-09-18 PROCEDURE — 90471 IMMUNIZATION ADMIN: CPT | Performed by: FAMILY MEDICINE

## 2024-09-18 RX ORDER — TAMSULOSIN HYDROCHLORIDE 0.4 MG/1
0.4 CAPSULE ORAL DAILY
Qty: 90 CAPSULE | Refills: 1 | Status: SHIPPED | OUTPATIENT
Start: 2024-09-18

## 2024-09-18 RX ORDER — VENLAFAXINE HYDROCHLORIDE 150 MG/1
150 CAPSULE, EXTENDED RELEASE ORAL DAILY
Qty: 90 CAPSULE | Refills: 1 | Status: SHIPPED | OUTPATIENT
Start: 2024-09-18

## 2024-09-18 RX ORDER — ATORVASTATIN CALCIUM 40 MG/1
40 TABLET, FILM COATED ORAL DAILY
Qty: 90 TABLET | Refills: 1 | Status: SHIPPED | OUTPATIENT
Start: 2024-09-18 | End: 2024-09-27

## 2024-09-18 ASSESSMENT — PAIN SCALES - GENERAL: PAINLEVEL: NO PAIN (0)

## 2024-09-18 NOTE — PROGRESS NOTES
Preoperative Evaluation  [address] [address]  Phone: [phone]  Primary Provider: Ru Davis MD  Pre-op Performing Provider: Ru Davis MD  Sep 18, 2024           9/13/2024   Surgical Information   What procedure is being done? Pre-Op Physical/ Carpal tunnel/ Trigger finger left hand    Facility or Hospital where procedure/surgery will be performed: [facility]   Who is doing the procedure / surgery? Dr. Milena Mast   Date of surgery / procedure: 10/11/24   Time of surgery / procedure: 9AM   Where do you plan to recover after surgery? at home with family        Fax number for surgical facility: [phone]    Assessment & Plan     The proposed surgical procedure is considered LOW risk.    Pre-op exam    - Comprehensive metabolic panel; Future  - CBC with Platelets; Future  - Comprehensive metabolic panel  - CBC with Platelets    Hyperlipidemia LDL goal <130    - atorvastatin (LIPITOR) 40 MG tablet; Take 1 tablet (40 mg) by mouth daily.  - Lipid panel reflex to direct LDL Fasting; Future  - Lipid panel reflex to direct LDL Fasting    ELVIS (generalized anxiety disorder)    - venlafaxine (EFFEXOR XR) 150 MG 24 hr capsule; Take 1 capsule (150 mg) by mouth daily.    Carpal tunnel syndrome of left wrist      Trigger finger, acquired      Benign prostatic hyperplasia with lower urinary tract symptoms, symptom details unspecified    - tamsulosin (FLOMAX) 0.4 MG capsule; Take 1 capsule (0.4 mg) by mouth daily.            - No identified additional risk factors other than previously addressed       The longitudinal plan of care for the diagnosis(es)/condition(s) as documented were addressed during this visit. Due to the added complexity in care, I will continue to support Fritz in the subsequent management and with ongoing continuity of care.    Recommendation  Approval given to proceed with proposed procedure, without further diagnostic  evaluation.    Subjective   Fritz is a 63 year old, presenting for the following:  Pre-Op Exam (Left hand- Carpal tunnel/trigger finger )        HPI related to upcoming procedure: left carpel tunal and trigger finger        9/13/2024   Pre-Op Questionnaire   Have you ever had a heart attack or stroke? No   Have you ever had surgery on your heart or blood vessels, such as a stent placement, a coronary artery bypass, or surgery on an artery in your head, neck, heart, or legs? No   Do you have chest pain with activity? No   Do you have a history of heart failure? No   Do you currently have a cold, bronchitis or symptoms of other infection? No   Do you have a cough, shortness of breath, or wheezing? No   Do you or anyone in your family have previous history of blood clots? No   Do you or does anyone in your family have a serious bleeding problem such as prolonged bleeding following surgeries or cuts? No   Have you ever had problems with anemia or been told to take iron pills? No   Have you had any abnormal blood loss such as black, tarry or bloody stools? No   Have you ever had a blood transfusion? No   Are you willing to have a blood transfusion if it is medically needed before, during, or after your surgery? Yes   Have you or any of your relatives ever had problems with anesthesia? No   Do you have sleep apnea, excessive snoring or daytime drowsiness? (!) YES   Do you have a CPAP machine? (!) NO    Do you have any artifical heart valves or other implanted medical devices like a pacemaker, defibrillator, or continuous glucose monitor? No   Do you have artificial joints? No   Are you allergic to latex? No        Health Care Directive  Patient does not have a Health Care Directive or Living Will: Discussed advance care planning with patient; however, patient declined at this time.    Preoperative Review of       Status of Chronic Conditions:  See problem list for active medical problems.  Problems all longstanding and  stable, except as noted/documented.  See ROS for pertinent symptoms related to these conditions.    Patient Active Problem List    Diagnosis Date Noted    Chest pain 12/21/2022     Priority: Medium    REY (obstructive sleep apnea) 08/31/2017     Priority: Medium    ELVIS (generalized anxiety disorder) 05/13/2016     Priority: Medium    Chronic folliculitis 04/12/2016     Priority: Medium    HYPERLIPIDEMIA LDL GOAL <130 10/31/2010     Priority: Medium    Esophageal reflux 10/05/2005     Priority: Medium    Depressive disorder, not elsewhere classified 10/04/2005     Priority: Medium    Panic disorder without agoraphobia 11/18/2003     Priority: Medium      Past Medical History:   Diagnosis Date    Coronary artery disease 04/2013    some angina here and there. April 2013 at [facility]    Depressive disorder     Due to tragedy of son, Daniel    ELVIS (generalized anxiety disorder) 05/13/2016    Gastroesophageal reflux disease     somewhat but stopped taking Nexium    Heart disease April 2013    Hyperlipidemia LDL goal <130 10/31/2010    Obstructive sleep apnea 03/02/2017    most recent sleep study from [facility], No CPAP    Sleep apnea 1994    Chronic issue for over 15+ years     Past Surgical History:   Procedure Laterality Date    COLONOSCOPY  2013    Going June 9, 2016    COSMETIC SURGERY  1981    Rhinoplasty/chin implant-Deviated Septum    ENDOSCOPY DRUG INDUCED SLEEP Bilateral 7/19/2017    Procedure: ENDOSCOPY DRUG INDUCED SLEEP;  Drug Induced Sleep Endoscopy;  Surgeon: Liliana Paige MD;  Location:  OR    ENT SURGERY  over the years    CPAP never worked for me. Uncomfortable.    IMPLANT GENERATOR STIMULATOR (LOCATION) Right 9/6/2017    Procedure: IMPLANT GENERATOR STIMULATOR (LOCATION);  Right Hypoglossal Nerve (Inspire) Implantation with Facial Nerve Monitoring;  Surgeon: Liliana Paige MD;  Location:  OR     Current Outpatient Medications   Medication Sig Dispense Refill     "atorvastatin (LIPITOR) 40 MG tablet Take 1 tablet (40 mg) by mouth daily. 90 tablet 1    Esomeprazole Magnesium (NEXIUM PO) Take 40 mg by mouth every morning (before breakfast)      omega-3 fatty acids 1200 MG capsule Take 2 capsules by mouth daily. 180 capsule 12    tamsulosin (FLOMAX) 0.4 MG capsule Take 1 capsule (0.4 mg) by mouth daily. 90 capsule 1    venlafaxine (EFFEXOR XR) 150 MG 24 hr capsule Take 1 capsule (150 mg) by mouth daily. 90 capsule 1    VITAMIN D, CHOLECALCIFEROL, PO Take 2-4 tablets by mouth daily.         Allergies   Allergen Reactions    No Known Drug Allergy         Social History     Tobacco Use    Smoking status: Never     Passive exposure: Never    Smokeless tobacco: Never    Tobacco comments:     Do not smoke   Substance Use Topics    Alcohol use: Yes     Comment: Weekends       History   Drug Use No             Review of Systems  Constitutional, HEENT, cardiovascular, pulmonary, gi and gu systems are negative, except as otherwise noted.    Objective    /88   Pulse 69   Temp 97.1  F (36.2  C) (Tympanic)   Resp 16   Ht 1.842 m (6' 0.52\")   Wt 98.1 kg (216 lb 3.2 oz)   SpO2 98%   BMI 28.90 kg/m     Estimated body mass index is 28.9 kg/m  as calculated from the following:    Height as of this encounter: 1.842 m (6' 0.52\").    Weight as of this encounter: 98.1 kg (216 lb 3.2 oz).  Physical Exam  GENERAL: alert and no distress  EYES: Eyes grossly normal to inspection, PERRL and conjunctivae and sclerae normal  HENT: ear canals and TM's normal, nose and mouth without ulcers or lesions  NECK: no adenopathy, no asymmetry, masses, or scars  RESP: lungs clear to auscultation - no rales, rhonchi or wheezes  CV: regular rate and rhythm, normal S1 S2, no S3 or S4, no murmur, click or rub, no peripheral edema  ABDOMEN: soft, nontender, no hepatosplenomegaly, no masses and bowel sounds normal  MS: no gross musculoskeletal defects noted, no edema  SKIN: no suspicious lesions or " rashes  NEURO: Normal strength and tone, mentation intact and speech normal  PSYCH: mentation appears normal, affect normal/bright    Recent Labs   Lab Test 04/30/24  1139 10/25/23  1048 10/19/23  1001   HGB  --   --  16.7   PLT  --   --  236     --  138   POTASSIUM 4.0  --  5.1   CR 1.14  --  1.09   A1C 5.7* 5.9*  --         Diagnostics  Labs pending at this time.  Results will be reviewed when available.   No EKG required, no history of coronary heart disease, significant arrhythmia, peripheral arterial disease or other structural heart disease.    Revised Cardiac Risk Index (RCRI)  The patient has the following serious cardiovascular risks for perioperative complications:   - No serious cardiac risks = 0 points     RCRI Interpretation: 0 points: Class I (very low risk - 0.4% complication rate)         Signed Electronically by: Ru Davis MD  A copy of this evaluation report is provided to the requesting physician.

## 2024-09-24 ENCOUNTER — MYC MEDICAL ADVICE (OUTPATIENT)
Dept: FAMILY MEDICINE | Facility: CLINIC | Age: 64
End: 2024-09-24
Payer: COMMERCIAL

## 2024-09-24 NOTE — TELEPHONE ENCOUNTER
Juliano response from pt stating that he'd like to change his appt to Friday 9/27/2024 at 11:10am. Appt rescheduled per pt's request and response sent to confirm.    Zina Navarrete,  [facility]

## 2024-09-24 NOTE — TELEPHONE ENCOUNTER
Called pt to see if he's like to r/s his appt to be seen sooner than 10/10/2024. No answer, left voice message for pt to return call. Ning by [employer] response sent as well.    Zina Navarrete,  [facility]

## 2024-09-24 NOTE — TELEPHONE ENCOUNTER
Called pt to assist with scheduling hospital follow-up appointment. Pt scheduled for Thursday, 10/10/2024 at 8:10am (check-in) to see Dr. Davis.    Zina Navarrete,  [facility]

## 2024-09-27 ENCOUNTER — OFFICE VISIT (OUTPATIENT)
Dept: FAMILY MEDICINE | Facility: CLINIC | Age: 64
End: 2024-09-27
Payer: COMMERCIAL

## 2024-09-27 VITALS
SYSTOLIC BLOOD PRESSURE: 118 MMHG | RESPIRATION RATE: 19 BRPM | WEIGHT: 209.2 LBS | BODY MASS INDEX: 28.33 KG/M2 | OXYGEN SATURATION: 99 % | HEIGHT: 72 IN | HEART RATE: 73 BPM | TEMPERATURE: 97.4 F | DIASTOLIC BLOOD PRESSURE: 78 MMHG

## 2024-09-27 DIAGNOSIS — E78.5 HYPERLIPIDEMIA LDL GOAL <100: Primary | ICD-10-CM

## 2024-09-27 DIAGNOSIS — I21.4 NSTEMI (NON-ST ELEVATED MYOCARDIAL INFARCTION) (H): ICD-10-CM

## 2024-09-27 DIAGNOSIS — I25.112 CORONARY ARTERY DISEASE INVOLVING NATIVE CORONARY ARTERY OF NATIVE HEART WITH REFRACTORY ANGINA PECTORIS (H): ICD-10-CM

## 2024-09-27 PROCEDURE — G2211 COMPLEX E/M VISIT ADD ON: HCPCS | Performed by: FAMILY MEDICINE

## 2024-09-27 PROCEDURE — 99214 OFFICE O/P EST MOD 30 MIN: CPT | Performed by: FAMILY MEDICINE

## 2024-09-27 RX ORDER — METOPROLOL SUCCINATE 25 MG/1
25 TABLET, EXTENDED RELEASE ORAL
COMMUNITY
Start: 2024-09-24 | End: 2025-09-24

## 2024-09-27 RX ORDER — NITROGLYCERIN 0.4 MG/1
0.4 TABLET SUBLINGUAL
COMMUNITY
Start: 2024-09-23

## 2024-09-27 RX ORDER — ROSUVASTATIN CALCIUM 40 MG/1
40 TABLET, COATED ORAL
COMMUNITY
Start: 2024-09-23 | End: 2025-09-23

## 2024-09-27 RX ORDER — LOSARTAN POTASSIUM 25 MG/1
25 TABLET ORAL
COMMUNITY
Start: 2024-09-24 | End: 2025-09-24

## 2024-09-27 RX ORDER — ASPIRIN 81 MG/1
81 TABLET ORAL
COMMUNITY
Start: 2024-09-24 | End: 2025-09-24

## 2024-09-27 RX ORDER — CLOPIDOGREL BISULFATE 75 MG/1
75 TABLET ORAL DAILY
COMMUNITY

## 2024-09-27 ASSESSMENT — PAIN SCALES - GENERAL: PAINLEVEL: MILD PAIN (2)

## 2024-09-27 NOTE — PROGRESS NOTES
"  Assessment & Plan     Hyperlipidemia LDL goal <100      Coronary artery disease involving native coronary artery of native heart with refractory angina pectoris (H)      NSTEMI (non-ST elevated myocardial infarction) (H)  Discussed recent hospitalization with the patient.  He has not myocardial infarction.  He has 1 stent placed however due to complexity he was recommended to do a bypass in the future.  He will follow-up with cardiology in the next few days.  Advised to continue metoprolol losartan as well as they change his medication from Lipitor to Crestor which is reasonable.  Advised patient to not stop his Plavix and aspirin up until he was informed by the cardiology.  Warning signs were discussed any chest pain shortness of breath or any sweating episode he needs to immediately get medical attention.  Patient was slight anxious however reassured him he is getting the appropriate care.    Female any refill on these medication he can contact the cardiology or contact us.  Patient already have established cardiology and would like to see them and go from there.    MED REC REQUIRED  Post Medication Reconciliation Status: discharge medications reconciled, continue medications without change  BMI  Estimated body mass index is 28.03 kg/m  as calculated from the following:    Height as of this encounter: 1.84 m (6' 0.44\").    Weight as of this encounter: 94.9 kg (209 lb 3.2 oz).     The longitudinal plan of care for the diagnosis(es)/condition(s) as documented were addressed during this visit. Due to the added complexity in care, I will continue to support Fritz in the subsequent management and with ongoing continuity of care.            Subjective   Fritz is a 63 year old, presenting for the following health issues:  Hospital F/U        9/27/2024    11:17 AM   Additional Questions   Roomed by Hawk BERRY       Hospital Follow-up Visit:    Hospital/Nursing Home/IP Rehab Facility:  [facility]   Date of Admission: " "9/22/24  Date of Discharge: 9/23/24  Reason(s) for Admission: CAD, STEMI and Heart Failure    Was the patient in the ICU or did the patient experience delirium during hospitalization?  No  Do you have any other stressors you would like to discuss with your provider? No    Problems taking medications regularly:  None  Medication changes since discharge: Losartan, Metoprolol, Nitrostat, Plavix, Rosuvastatin, Asprin.   Stopped taking Lipitor  - Was told not to take Lipitor and Crestor at same time - would like to discuss   Problems adhering to non-medication therapy:  None    Summary of hospitalization:  CareEverywhere information obtained and reviewed  Diagnostic Tests/Treatments reviewed.  Follow up needed: cardiolgy  Other Healthcare Providers Involved in Patient s Care:         None  Update since discharge: improved.         Plan of care communicated with patient          Review of Systems  Constitutional, HEENT, cardiovascular, pulmonary, gi and gu systems are negative, except as otherwise noted.      Objective    /78 (BP Location: Left arm, Patient Position: Sitting, Cuff Size: Adult Large)   Pulse 73   Temp 97.4  F (36.3  C) (Temporal)   Resp 19   Ht 1.84 m (6' 0.44\")   Wt 94.9 kg (209 lb 3.2 oz)   SpO2 99%   BMI 28.03 kg/m    Body mass index is 28.03 kg/m .  Physical Exam   GENERAL: alert and no distress  NECK: no adenopathy, no asymmetry, masses, or scars  RESP: lungs clear to auscultation - no rales, rhonchi or wheezes  CV: regular rate and rhythm, normal S1 S2, no S3 or S4, no murmur, click or rub, no peripheral edema  ABDOMEN: soft, nontender, no hepatosplenomegaly, no masses and bowel sounds normal  MS: no gross musculoskeletal defects noted, no edema            Signed Electronically by: Ru Davis MD    "

## 2024-12-29 ENCOUNTER — HEALTH MAINTENANCE LETTER (OUTPATIENT)
Age: 64
End: 2024-12-29

## 2025-03-10 DIAGNOSIS — N40.1 BENIGN PROSTATIC HYPERPLASIA WITH LOWER URINARY TRACT SYMPTOMS, SYMPTOM DETAILS UNSPECIFIED: ICD-10-CM

## 2025-03-11 RX ORDER — TAMSULOSIN HYDROCHLORIDE 0.4 MG/1
0.4 CAPSULE ORAL DAILY
Qty: 90 CAPSULE | Refills: 1 | Status: SHIPPED | OUTPATIENT
Start: 2025-03-11

## 2025-04-15 DIAGNOSIS — F41.1 GAD (GENERALIZED ANXIETY DISORDER): ICD-10-CM

## 2025-04-16 RX ORDER — VENLAFAXINE HYDROCHLORIDE 150 MG/1
150 CAPSULE, EXTENDED RELEASE ORAL DAILY
Qty: 90 CAPSULE | Refills: 0 | Status: SHIPPED | OUTPATIENT
Start: 2025-04-16

## 2025-04-30 ENCOUNTER — TRANSFERRED RECORDS (OUTPATIENT)
Dept: HEALTH INFORMATION MANAGEMENT | Facility: CLINIC | Age: 65
End: 2025-04-30
Payer: COMMERCIAL

## 2025-04-30 LAB
ALT SERPL-CCNC: 42 U/L (ref 9–46)
AST SERPL-CCNC: 29 U/L (ref 10–35)
CHOLESTEROL (EXTERNAL): 157 MG/DL
CREATININE (EXTERNAL): 1.14 MG/DL (ref 0.7–1.35)
GFR ESTIMATED (EXTERNAL): 73 ML/MIN/1.73M2
GLUCOSE (EXTERNAL): 109 MG/DL (ref 65–99)
HDLC SERPL-MCNC: 52 MG/DL
LDL CHOLESTEROL CALCULATED (EXTERNAL): 86 MG/DL
NON HDL CHOLESTEROL (EXTERNAL): 105 MG/DL
POTASSIUM (EXTERNAL): 4.2 MMOL/L (ref 3.5–5.3)
TRIGLYCERIDES (EXTERNAL): 94 MG/DL
TSH SERPL-ACNC: 3.09 MIU/L (ref 0.4–4.5)

## 2025-05-20 ENCOUNTER — MEDICAL CORRESPONDENCE (OUTPATIENT)
Dept: HEALTH INFORMATION MANAGEMENT | Facility: CLINIC | Age: 65
End: 2025-05-20
Payer: COMMERCIAL

## 2025-05-21 ENCOUNTER — OFFICE VISIT (OUTPATIENT)
Dept: FAMILY MEDICINE | Facility: CLINIC | Age: 65
End: 2025-05-21
Payer: COMMERCIAL

## 2025-05-21 VITALS
DIASTOLIC BLOOD PRESSURE: 72 MMHG | HEART RATE: 65 BPM | RESPIRATION RATE: 19 BRPM | SYSTOLIC BLOOD PRESSURE: 108 MMHG | WEIGHT: 204.6 LBS | OXYGEN SATURATION: 100 % | HEIGHT: 73 IN | TEMPERATURE: 97.3 F | BODY MASS INDEX: 27.11 KG/M2

## 2025-05-21 DIAGNOSIS — G56.01 CARPAL TUNNEL SYNDROME OF RIGHT WRIST: ICD-10-CM

## 2025-05-21 DIAGNOSIS — M65.30 TRIGGER FINGER, ACQUIRED: ICD-10-CM

## 2025-05-21 DIAGNOSIS — I25.112 CORONARY ARTERY DISEASE INVOLVING NATIVE CORONARY ARTERY OF NATIVE HEART WITH REFRACTORY ANGINA PECTORIS: Primary | ICD-10-CM

## 2025-05-21 DIAGNOSIS — Z01.818 PRE-OP EXAM: ICD-10-CM

## 2025-05-21 LAB
ANION GAP SERPL CALCULATED.3IONS-SCNC: 11 MMOL/L (ref 7–15)
BUN SERPL-MCNC: 13.9 MG/DL (ref 8–23)
CALCIUM SERPL-MCNC: 9.7 MG/DL (ref 8.8–10.4)
CHLORIDE SERPL-SCNC: 100 MMOL/L (ref 98–107)
CREAT SERPL-MCNC: 1.06 MG/DL (ref 0.67–1.17)
EGFRCR SERPLBLD CKD-EPI 2021: 79 ML/MIN/1.73M2
ERYTHROCYTE [DISTWIDTH] IN BLOOD BY AUTOMATED COUNT: 13.1 % (ref 10–15)
GLUCOSE SERPL-MCNC: 105 MG/DL (ref 70–99)
HCO3 SERPL-SCNC: 27 MMOL/L (ref 22–29)
HCT VFR BLD AUTO: 43.8 % (ref 40–53)
HGB BLD-MCNC: 15.4 G/DL (ref 13.3–17.7)
MCH RBC QN AUTO: 30.3 PG (ref 26.5–33)
MCHC RBC AUTO-ENTMCNC: 35.2 G/DL (ref 31.5–36.5)
MCV RBC AUTO: 86 FL (ref 78–100)
PLATELET # BLD AUTO: 223 10E3/UL (ref 150–450)
POTASSIUM SERPL-SCNC: 4.7 MMOL/L (ref 3.4–5.3)
RBC # BLD AUTO: 5.09 10E6/UL (ref 4.4–5.9)
SODIUM SERPL-SCNC: 138 MMOL/L (ref 135–145)
WBC # BLD AUTO: 6 10E3/UL (ref 4–11)

## 2025-05-21 PROCEDURE — G2211 COMPLEX E/M VISIT ADD ON: HCPCS | Performed by: FAMILY MEDICINE

## 2025-05-21 PROCEDURE — 3074F SYST BP LT 130 MM HG: CPT | Performed by: FAMILY MEDICINE

## 2025-05-21 PROCEDURE — 99214 OFFICE O/P EST MOD 30 MIN: CPT | Performed by: FAMILY MEDICINE

## 2025-05-21 PROCEDURE — 36415 COLL VENOUS BLD VENIPUNCTURE: CPT | Performed by: FAMILY MEDICINE

## 2025-05-21 PROCEDURE — 80048 BASIC METABOLIC PNL TOTAL CA: CPT | Performed by: FAMILY MEDICINE

## 2025-05-21 PROCEDURE — 85027 COMPLETE CBC AUTOMATED: CPT | Performed by: FAMILY MEDICINE

## 2025-05-21 PROCEDURE — 3078F DIAST BP <80 MM HG: CPT | Performed by: FAMILY MEDICINE

## 2025-05-21 PROCEDURE — 1126F AMNT PAIN NOTED NONE PRSNT: CPT | Performed by: FAMILY MEDICINE

## 2025-05-21 RX ORDER — EZETIMIBE 10 MG/1
10 TABLET ORAL DAILY
COMMUNITY
Start: 2024-12-26 | End: 2025-12-26

## 2025-05-21 RX ORDER — B-COMPLEX WITH VITAMIN C
CAPSULE ORAL
COMMUNITY

## 2025-05-21 ASSESSMENT — PAIN SCALES - GENERAL: PAINLEVEL_OUTOF10: NO PAIN (0)

## 2025-05-21 NOTE — PROGRESS NOTES
Preoperative Evaluation  [address] [address]  Phone: [phone]  Primary Provider: Ru Davis MD  Pre-op Performing Provider: Ru Davis MD  May 21, 2025             5/16/2025   Surgical Information   What procedure is being done? Hand Surgery   Facility or Hospital where procedure/surgery will be performed: [facility]   Who is doing the procedure / surgery? Dr. Milena Link   Date of surgery / procedure: June 6, 2025   Time of surgery / procedure: Morning   Where do you plan to recover after surgery? at home with family     Fax number for surgical facility: [phone]    Assessment & Plan     The proposed surgical procedure is considered LOW risk.    Pre-op exam    - CBC with Platelets; Future  - BASIC METABOLIC PANEL; Future  - CBC with Platelets  - BASIC METABOLIC PANEL    Coronary artery disease involving native coronary artery of native heart with refractory angina pectoris  - CBC with Platelets; Future  - BASIC METABOLIC PANEL; Future  - CBC with Platelets  - BASIC METABOLIC PANEL    Trigger finger, acquired      Carpal tunnel syndrome of right wrist               - No identified additional risk factors other than previously addressed    Patient to hold his Plavix 2 days prior to surgery as per cardiology recommendation resume that once procedure is done.  He should continue Plavix and aspirin up until 1 year after his recent stent which was placed in October.    Recommendation  Approval given to proceed with proposed procedure, without further diagnostic evaluation.    Recommendation  from cardiology office.  Below is recommendations from Kris Narayanan PA-C in Cardiology:    Appears the surgeon would like to hold plavix 48 hour prior to surgery. This is fine - I would just resume as soon as is safe to do so post operatively. Appears she is okay with continuing aspirin at the time of surgery.    I would continue aspirin,  statin, and beta blocker through the surgery.    Care team - please relay to Dr. Davis's team.    Karel Narayanan PA-C  [facility] Cardiology Service       The longitudinal plan of care for the diagnosis(es)/condition(s) as documented were addressed during this visit. Due to the added complexity in care, I will continue to support Fritz in the subsequent management and with ongoing continuity of care.      Ru Davis MD      Subjective   Fritz is a 64 year old, presenting for the following:  Pre-Op Exam (Right Hand Carpel Tunnel surgery)        HPI: Patient is 64-year-old male underlying history of coronary artery disease status post stents in October.  He denies any chest pain or shortness of breath.  Currently on appropriate medication including aspirin and Plavix.  He denies any chest pain with exertion.  Planning to get trigger finger as well as right wrist surgery for carpal tunnel.        5/16/2025   Pre-Op Questionnaire   Have you ever had a heart attack or stroke? (!) YES    Have you ever had surgery on your heart or blood vessels, such as a stent placement, a coronary artery bypass, or surgery on an artery in your head, neck, heart, or legs? (!) YES    Do you have chest pain with activity? No   Do you have a history of heart failure? No   Do you currently have a cold, bronchitis or symptoms of other infection? No   Do you have a cough, shortness of breath, or wheezing? No   Do you or anyone in your family have previous history of blood clots? No   Do you or does anyone in your family have a serious bleeding problem such as prolonged bleeding following surgeries or cuts? No   Have you ever had problems with anemia or been told to take iron pills? No   Have you had any abnormal blood loss such as black, tarry or bloody stools? No   Have you ever had a blood transfusion? No   Are you willing to have a blood transfusion if it is medically needed before, during, or after your surgery? Yes   Have you or any of  your relatives ever had problems with anesthesia? No   Do you have sleep apnea, excessive snoring or daytime drowsiness? (!) YES   Do you have a CPAP machine? (!) NO    Do you have any artifical heart valves or other implanted medical devices like a pacemaker, defibrillator, or continuous glucose monitor? (!) YES   What type of device do you have? Inspire Sleep Device   Name of the clinic that manages your device [facility]   Do you have artificial joints? No   Are you allergic to latex? No     Advance Care Planning      Preoperative Review of   }    Status of Chronic Conditions:  See problem list for active medical problems.  Problems all longstanding and stable, except as noted/documented.  See ROS for pertinent symptoms related to these conditions.    Patient Active Problem List    Diagnosis Date Noted    Chest pain 12/21/2022     Priority: Medium    REY (obstructive sleep apnea) 08/31/2017     Priority: Medium    ELVIS (generalized anxiety disorder) 05/13/2016     Priority: Medium    Chronic folliculitis 04/12/2016     Priority: Medium    HYPERLIPIDEMIA LDL GOAL <130 10/31/2010     Priority: Medium    Esophageal reflux 10/05/2005     Priority: Medium    Depressive disorder, not elsewhere classified 10/04/2005     Priority: Medium    Panic disorder without agoraphobia 11/18/2003     Priority: Medium      Past Medical History:   Diagnosis Date    Coronary artery disease 04/2013    some angina here and there. April 2013 at [facility]    Depressive disorder     Due to tragedy of son, Daniel    ELVIS (generalized anxiety disorder) 05/13/2016    Gastroesophageal reflux disease     somewhat but stopped taking Nexium    Heart disease April 2013    Hyperlipidemia LDL goal <130 10/31/2010    Obstructive sleep apnea 03/02/2017    most recent sleep study from [facility], No CPAP    Sleep apnea 1994    Chronic issue for over 15+ years     Past Surgical History:   Procedure Laterality Date    COLONOSCOPY  2013     Going June 9, 2016    COSMETIC SURGERY  1981    Rhinoplasty/chin implant-Deviated Septum    ENDOSCOPY DRUG INDUCED SLEEP Bilateral 7/19/2017    Procedure: ENDOSCOPY DRUG INDUCED SLEEP;  Drug Induced Sleep Endoscopy;  Surgeon: Liliana Paige MD;  Location:  OR    ENT SURGERY  over the years    CPAP never worked for me. Uncomfortable.    IMPLANT GENERATOR STIMULATOR (LOCATION) Right 9/6/2017    Procedure: IMPLANT GENERATOR STIMULATOR (LOCATION);  Right Hypoglossal Nerve (Inspire) Implantation with Facial Nerve Monitoring;  Surgeon: Liliana Paige MD;  Location:  OR     Current Outpatient Medications   Medication Sig Dispense Refill    aspirin 81 MG EC tablet Take 81 mg by mouth.      clopidogrel (PLAVIX) 75 MG tablet Take 75 mg by mouth daily.      Esomeprazole Magnesium (NEXIUM PO) Take 40 mg by mouth every morning (before breakfast)      ezetimibe (ZETIA) 10 MG tablet Take 10 mg by mouth daily.      losartan (COZAAR) 25 MG tablet Take 25 mg by mouth.      metoprolol succinate ER (TOPROL XL) 25 MG 24 hr tablet Take 25 mg by mouth.      omega-3 fatty acids 1200 MG capsule Take 2 capsules by mouth daily. 180 capsule 12    rosuvastatin (CRESTOR) 40 MG tablet Take 40 mg by mouth.      tamsulosin (FLOMAX) 0.4 MG capsule TAKE 1 CAPSULE BY MOUTH DAILY 90 capsule 1    venlafaxine (EFFEXOR XR) 150 MG 24 hr capsule TAKE 1 CAPSULE BY MOUTH DAILY 90 capsule 0    VITAMIN D, CHOLECALCIFEROL, PO Take 2-4 tablets by mouth daily.      B Complex-C CAPS       FIBER GUMMIES PO       nitroGLYcerin (NITROSTAT) 0.4 MG sublingual tablet Place 0.4 mg under the tongue. (Patient not taking: Reported on 5/21/2025)         Allergies   Allergen Reactions    No Known Drug Allergy         Social History     Tobacco Use    Smoking status: Never     Passive exposure: Never    Smokeless tobacco: Never    Tobacco comments:     Do not smoke   Substance Use Topics    Alcohol use: Yes     Comment: Weekends     Family History  "  Problem Relation Age of Onset    Alzheimer Disease Mother     Dementia Mother         Dementia    C.A.D. Father 86        Bypass 5/2011    Cancer Father         Esophageal cancer    Hypertension Father     Hyperlipidemia Father     Thyroid Disease Brother     Thyroid Disease Brother         i think underactive/very tired    Cancer Maternal Grandmother         Stomach Cancer    Stomach Problem Maternal Grandmother         colon/stomach cancer    Colon Cancer Maternal Grandmother     Cancer Maternal Grandfather         Lung cancer    Other Cancer Maternal Grandfather         Lung    Thyroid Disease Brother         ,    Cancer Paternal Grandfather         Prostrate cancer    Prostate Cancer Paternal Grandfather     Thyroid Disease Brother         i think underactive/very tired     History   Drug Use No             Review of Systems  Constitutional, HEENT, cardiovascular, pulmonary, gi and gu systems are negative, except as otherwise noted.    Objective    /72   Pulse 65   Temp 97.3  F (36.3  C) (Tympanic)   Resp 19   Ht 1.843 m (6' 0.56\")   Wt 92.8 kg (204 lb 9.6 oz)   SpO2 100%   BMI 27.32 kg/m     Estimated body mass index is 27.32 kg/m  as calculated from the following:    Height as of this encounter: 1.843 m (6' 0.56\").    Weight as of this encounter: 92.8 kg (204 lb 9.6 oz).  Physical Exam  GENERAL: alert and no distress  EYES: Eyes grossly normal to inspection, PERRL and conjunctivae and sclerae normal  HENT: ear canals and TM's normal, nose and mouth without ulcers or lesions  NECK: no adenopathy, no asymmetry, masses, or scars  RESP: lungs clear to auscultation - no rales, rhonchi or wheezes  CV: regular rate and rhythm, normal S1 S2, no S3 or S4, no murmur, click or rub, no peripheral edema  ABDOMEN: soft, nontender, no hepatosplenomegaly, no masses and bowel sounds normal  MS: no gross musculoskeletal defects noted, no edema  SKIN: no suspicious lesions or rashes  NEURO: Normal strength and " tone, mentation intact and speech normal  PSYCH: mentation appears normal, affect normal/bright    Recent Labs   Lab Test 09/18/24  0958   HGB 16.2         POTASSIUM 4.6   CR 1.07        Diagnostics  Results for orders placed or performed in visit on 05/21/25   CBC with Platelets     Status: Normal   Result Value Ref Range    WBC Count 6.0 4.0 - 11.0 10e3/uL    RBC Count 5.09 4.40 - 5.90 10e6/uL    Hemoglobin 15.4 13.3 - 17.7 g/dL    Hematocrit 43.8 40.0 - 53.0 %    MCV 86 78 - 100 fL    MCH 30.3 26.5 - 33.0 pg    MCHC 35.2 31.5 - 36.5 g/dL    RDW 13.1 10.0 - 15.0 %    Platelet Count 223 150 - 450 10e3/uL       EKG was done yesterday at cardiology office no acute changes a    Revised Cardiac Risk Index (RCRI)  The patient has the following serious cardiovascular risks for perioperative complications:   - No serious cardiac risks = 0 points     RCRI Interpretation: 0 points: Class I (very low risk - 0.4% complication rate)         Signed Electronically by: Ru Davis MD  A copy of this evaluation report is provided to the requesting physician.

## 2025-05-31 ENCOUNTER — HOSPITAL ENCOUNTER (OUTPATIENT)
Dept: CT IMAGING | Facility: CLINIC | Age: 65
Discharge: HOME | End: 2025-05-31
Attending: FAMILY MEDICINE | Admitting: FAMILY MEDICINE
Payer: COMMERCIAL

## 2025-05-31 DIAGNOSIS — S06.5XAA SUBDURAL HEMATOMA (H): ICD-10-CM

## 2025-05-31 PROCEDURE — 70450 CT HEAD/BRAIN W/O DYE: CPT

## 2025-06-03 ENCOUNTER — RESULTS FOLLOW-UP (OUTPATIENT)
Dept: FAMILY MEDICINE | Facility: CLINIC | Age: 65
End: 2025-06-03

## 2025-06-17 ENCOUNTER — TRANSFERRED RECORDS (OUTPATIENT)
Dept: HEALTH INFORMATION MANAGEMENT | Facility: CLINIC | Age: 65
End: 2025-06-17
Payer: COMMERCIAL

## 2025-06-22 DIAGNOSIS — F41.1 GAD (GENERALIZED ANXIETY DISORDER): ICD-10-CM

## 2025-06-23 ENCOUNTER — MYC MEDICAL ADVICE (OUTPATIENT)
Dept: FAMILY MEDICINE | Facility: CLINIC | Age: 65
End: 2025-06-23
Payer: COMMERCIAL

## 2025-06-23 RX ORDER — VENLAFAXINE HYDROCHLORIDE 150 MG/1
150 CAPSULE, EXTENDED RELEASE ORAL DAILY
Qty: 90 CAPSULE | Refills: 1 | Status: SHIPPED | OUTPATIENT
Start: 2025-06-23

## 2025-07-28 ENCOUNTER — TRANSFERRED RECORDS (OUTPATIENT)
Dept: HEALTH INFORMATION MANAGEMENT | Facility: CLINIC | Age: 65
End: 2025-07-28
Payer: COMMERCIAL

## 2025-08-06 DIAGNOSIS — N40.1 BENIGN PROSTATIC HYPERPLASIA WITH LOWER URINARY TRACT SYMPTOMS, SYMPTOM DETAILS UNSPECIFIED: ICD-10-CM

## 2025-08-07 RX ORDER — TAMSULOSIN HYDROCHLORIDE 0.4 MG/1
0.4 CAPSULE ORAL DAILY
Qty: 90 CAPSULE | Refills: 2 | Status: SHIPPED | OUTPATIENT
Start: 2025-08-07

## 2025-08-11 ENCOUNTER — TRANSFERRED RECORDS (OUTPATIENT)
Dept: HEALTH INFORMATION MANAGEMENT | Facility: CLINIC | Age: 65
End: 2025-08-11
Payer: COMMERCIAL

## (undated) DEVICE — DRAPE MICRO ZEISS PENTERO 120X54" G650DL

## (undated) DEVICE — COVER NEOPROBE SOFTFLEX 5X96" W/BANDS 20-PC596

## (undated) DEVICE — SPONGE COTTONOID 1/2X1/2" 20-04S

## (undated) DEVICE — LABEL MEDICATION SYSTEM 3303-P

## (undated) DEVICE — BLADE KNIFE SURG 15 371115

## (undated) DEVICE — NDL ANGIOCATH 18GA 1.25" 4055

## (undated) DEVICE — SU VICRYL 3-0 SH 8X18" UND J864D

## (undated) DEVICE — DRAPE IOBAN INCISE 23X17" 6650EZ

## (undated) DEVICE — DRAPE STERI TOWEL LG 1010

## (undated) DEVICE — LINEN TOWEL PACK X5 5464

## (undated) DEVICE — PREP POVIDONE IODINE SCRUB 7.5% 120ML

## (undated) DEVICE — PACK ENT MINOR CUSTOM ASC

## (undated) DEVICE — SU SILK 3-0 SH CR 8X18" C013D

## (undated) DEVICE — SOL WATER IRRIG 1000ML BOTTLE 2F7114

## (undated) DEVICE — NIM PROBE STIMULATOR SIDE-BY-SIDE BIPOLAR 8225401

## (undated) DEVICE — SOL NACL 0.9% IRRIG 1000ML BOTTLE 2F7124

## (undated) DEVICE — VESSEL LOOPS RED MINI 31145710

## (undated) DEVICE — TUNNELING TOOL AND OBTURATOR

## (undated) DEVICE — SU ETHILON 4-0 P-3 18" BLACK 699G

## (undated) DEVICE — SU SILK 2-0 SH 30" K833H

## (undated) DEVICE — GLOVE PROTEXIS POWDER FREE SMT 6.5  2D72PT65X

## (undated) DEVICE — DRSG TEGADERM 2 3/8X2 3/4" 1624W

## (undated) DEVICE — JELLY LUBRICATING SURGILUBE 2OZ TUBE

## (undated) DEVICE — SYR 10ML FINGER CONTROL W/O NDL 309695

## (undated) DEVICE — SYR 03ML LL W/O NDL

## (undated) DEVICE — PREP SKIN SCRUB TRAY 4461A

## (undated) DEVICE — CLIP HORIZON SM RED WIDE SLOT 001201

## (undated) DEVICE — CLIP HORIZON MED BLUE 002200

## (undated) DEVICE — RETR RING LONE STAR 14.1X14.1CM 3307G

## (undated) DEVICE — SU SILK 3-0 TIE 12X30" A304H

## (undated) DEVICE — REMOTE SLEEP INSPIRE 3032

## (undated) DEVICE — PREP POVIDONE IODINE SOLUTION 10% 120ML

## (undated) DEVICE — SYR EAR BULB 3OZ 0035830

## (undated) DEVICE — EYE PREP BETADINE 5% SOLUTION 30ML 0065-0411-30

## (undated) DEVICE — DRSG GAUZE 4X4" TRAY 6939

## (undated) DEVICE — RETR ELASTIC STAYS LONE STAR BLUNT DUAL LEAD 3550-1G

## (undated) DEVICE — SPONGE KITTNER 30-101

## (undated) DEVICE — NDL 25GA 1.5" 305127

## (undated) DEVICE — DRSG TEGADERM 8X12" 1629

## (undated) DEVICE — DRAPE POUCH IRR 1016

## (undated) DEVICE — ANTIFOG SOLUTION W/FOAM PAD 31142527

## (undated) DEVICE — SPONGE COTTONOID 1/2X2" 20-32S

## (undated) DEVICE — TONGUE DEPRESSOR STERILE 6023

## (undated) RX ORDER — FENTANYL CITRATE 50 UG/ML
INJECTION, SOLUTION INTRAMUSCULAR; INTRAVENOUS
Status: DISPENSED
Start: 2017-09-06

## (undated) RX ORDER — PROPOFOL 10 MG/ML
INJECTION, EMULSION INTRAVENOUS
Status: DISPENSED
Start: 2017-09-06

## (undated) RX ORDER — OXYMETAZOLINE HYDROCHLORIDE 0.05 G/100ML
SPRAY NASAL
Status: DISPENSED
Start: 2017-07-19

## (undated) RX ORDER — ACETAMINOPHEN 325 MG/1
TABLET ORAL
Status: DISPENSED
Start: 2017-09-06

## (undated) RX ORDER — PHENYLEPHRINE HCL IN 0.9% NACL 1 MG/10 ML
SYRINGE (ML) INTRAVENOUS
Status: DISPENSED
Start: 2017-09-06

## (undated) RX ORDER — CEFAZOLIN SODIUM 1 G/3ML
INJECTION, POWDER, FOR SOLUTION INTRAMUSCULAR; INTRAVENOUS
Status: DISPENSED
Start: 2017-09-06

## (undated) RX ORDER — OXYMETAZOLINE HYDROCHLORIDE 0.05 G/100ML
SPRAY NASAL
Status: DISPENSED
Start: 2017-09-06

## (undated) RX ORDER — HYDROCODONE BITARTRATE AND ACETAMINOPHEN 5; 325 MG/1; MG/1
TABLET ORAL
Status: DISPENSED
Start: 2017-09-06

## (undated) RX ORDER — GABAPENTIN 300 MG/1
CAPSULE ORAL
Status: DISPENSED
Start: 2017-09-06